# Patient Record
Sex: MALE | Race: WHITE | NOT HISPANIC OR LATINO | URBAN - METROPOLITAN AREA
[De-identification: names, ages, dates, MRNs, and addresses within clinical notes are randomized per-mention and may not be internally consistent; named-entity substitution may affect disease eponyms.]

---

## 2018-02-15 ENCOUNTER — OUTPATIENT (OUTPATIENT)
Dept: OUTPATIENT SERVICES | Facility: HOSPITAL | Age: 73
LOS: 1 days | End: 2018-02-15
Payer: MEDICARE

## 2018-02-15 VITALS
RESPIRATION RATE: 16 BRPM | TEMPERATURE: 98 F | DIASTOLIC BLOOD PRESSURE: 87 MMHG | OXYGEN SATURATION: 96 % | HEART RATE: 70 BPM | HEIGHT: 66 IN | WEIGHT: 158.95 LBS | SYSTOLIC BLOOD PRESSURE: 135 MMHG

## 2018-02-15 DIAGNOSIS — Z98.1 ARTHRODESIS STATUS: Chronic | ICD-10-CM

## 2018-02-15 DIAGNOSIS — Z98.49 CATARACT EXTRACTION STATUS, UNSPECIFIED EYE: Chronic | ICD-10-CM

## 2018-02-15 DIAGNOSIS — Z98.890 OTHER SPECIFIED POSTPROCEDURAL STATES: Chronic | ICD-10-CM

## 2018-02-15 DIAGNOSIS — Z01.818 ENCOUNTER FOR OTHER PREPROCEDURAL EXAMINATION: ICD-10-CM

## 2018-02-15 DIAGNOSIS — I65.21 OCCLUSION AND STENOSIS OF RIGHT CAROTID ARTERY: ICD-10-CM

## 2018-02-15 LAB
ANION GAP SERPL CALC-SCNC: 21 MMOL/L — HIGH (ref 5–17)
BLD GP AB SCN SERPL QL: NEGATIVE — SIGNIFICANT CHANGE UP
BUN SERPL-MCNC: 26 MG/DL — HIGH (ref 7–23)
CALCIUM SERPL-MCNC: 9.8 MG/DL — SIGNIFICANT CHANGE UP (ref 8.4–10.5)
CHLORIDE SERPL-SCNC: 103 MMOL/L — SIGNIFICANT CHANGE UP (ref 96–108)
CO2 SERPL-SCNC: 19 MMOL/L — LOW (ref 22–31)
CREAT SERPL-MCNC: 1.55 MG/DL — HIGH (ref 0.5–1.3)
GLUCOSE SERPL-MCNC: 116 MG/DL — HIGH (ref 70–99)
HCT VFR BLD CALC: 51.2 % — HIGH (ref 39–50)
HGB BLD-MCNC: 16.9 G/DL — SIGNIFICANT CHANGE UP (ref 13–17)
MCHC RBC-ENTMCNC: 31.2 PG — SIGNIFICANT CHANGE UP (ref 27–34)
MCHC RBC-ENTMCNC: 33 GM/DL — SIGNIFICANT CHANGE UP (ref 32–36)
MCV RBC AUTO: 94.5 FL — SIGNIFICANT CHANGE UP (ref 80–100)
PLATELET # BLD AUTO: 232 K/UL — SIGNIFICANT CHANGE UP (ref 150–400)
POTASSIUM SERPL-MCNC: 3.5 MMOL/L — SIGNIFICANT CHANGE UP (ref 3.5–5.3)
POTASSIUM SERPL-SCNC: 3.5 MMOL/L — SIGNIFICANT CHANGE UP (ref 3.5–5.3)
RBC # BLD: 5.42 M/UL — SIGNIFICANT CHANGE UP (ref 4.2–5.8)
RBC # FLD: 14.7 % — HIGH (ref 10.3–14.5)
RH IG SCN BLD-IMP: POSITIVE — SIGNIFICANT CHANGE UP
SODIUM SERPL-SCNC: 143 MMOL/L — SIGNIFICANT CHANGE UP (ref 135–145)
WBC # BLD: 10.74 K/UL — HIGH (ref 3.8–10.5)
WBC # FLD AUTO: 10.74 K/UL — HIGH (ref 3.8–10.5)

## 2018-02-15 PROCEDURE — 80048 BASIC METABOLIC PNL TOTAL CA: CPT

## 2018-02-15 PROCEDURE — G0463: CPT

## 2018-02-15 PROCEDURE — 86901 BLOOD TYPING SEROLOGIC RH(D): CPT

## 2018-02-15 PROCEDURE — 85027 COMPLETE CBC AUTOMATED: CPT

## 2018-02-15 PROCEDURE — 86900 BLOOD TYPING SEROLOGIC ABO: CPT

## 2018-02-15 PROCEDURE — 86850 RBC ANTIBODY SCREEN: CPT

## 2018-02-15 NOTE — H&P PST ADULT - NSANTHOSAYNRD_GEN_A_CORE
No. FRANCO screening performed.  STOP BANG Legend: 0-2 = LOW Risk; 3-4 = INTERMEDIATE Risk; 5-8 = HIGH Risk

## 2018-02-15 NOTE — H&P PST ADULT - PMH
COPD (chronic obstructive pulmonary disease)  Bronchiectasis, recent PFT test was normal ( as per patient )  GERD (gastroesophageal reflux disease)    Hypercholesteremia    Hypertension    Stenosis of right carotid artery  98% COPD (chronic obstructive pulmonary disease)  Bronchiectasis, recent PFT test was normal ( as per patient )  Deviated septum  septum perforation , spoke with ENT today , inconsequential , no issues  GERD (gastroesophageal reflux disease)    Hypercholesteremia    Hypertension    Stenosis of right carotid artery  severe COPD (chronic obstructive pulmonary disease)  Bronchiectasis, recent PFT test was normal ( as per patient ), followed by Dr. Cheng  Deviated septum  septum perforation , spoke with ENT today , inconsequential , no issues  GERD (gastroesophageal reflux disease)  Moreno's esophagus  Hypercholesteremia    Hypertension    Stenosis of right carotid artery  severe

## 2018-02-15 NOTE — H&P PST ADULT - OTHER CARE PROVIDERS
[facility] [phone] [facility] [phone], Cardiologist Dr. Ralph Guzmán  [phone] last seen 2/2018, ENT Dr. Amanda Johnson [phone], pulm Dr. Jordin Fisher [phone] ( last seen 6 month ago )

## 2018-02-15 NOTE — H&P PST ADULT - PROBLEM SELECTOR PLAN 1
Right carotid endarterectomy   PST instructions provided, surgical scrub given, patient verbalized understanding.   CBC, BMP, T&S collected and send.  scheduled to see PCP tomorrow  cardiac eval in the chart , reviewed

## 2018-02-15 NOTE — H&P PST ADULT - ATTENDING COMMENTS
We plan right carotid endarterectomy for high grade stenosis.  All risks and alternatives were reiterated to the Avanzatos who agree with this plan.

## 2018-02-15 NOTE — H&P PST ADULT - PSH
H/O colonoscopy  2015  H/O spinal fusion  2000  rods cervical and lumbar spine  History of cataract surgery  b/l  History of tonsillectomy

## 2018-02-15 NOTE — H&P PST ADULT - HISTORY OF PRESENT ILLNESS
Pt of 67y Male with PMH of HTN and High Chol and PSH of Cervical and Lumbar Spine Surgery (Retroperitoneal Approach) that come to the ER today c/o Nausea, vomiting and abdominal pain for 24 hours, He denies Fever, CP or SOB, Also He c/o Costipation for 24 hours, But is passing flatus. Last colonoscopy 2 years ago (? Normal), In the ER He got a CT Scan that show Partial SBO vs Ileus for that reason He is Admited to Surgery. Pt of 67y Male with PMH of HTN and High Chol and PSH of Cervical and Lumbar Spine Surgery (Retroperitoneal Approach) 73 yr old male with HTN, HLD, COPD, GERD,  spinal stenosis, s/p cervical and lumbar fusion 2000, severe, right carotid artery stenosis, presents to [facility] for scheduled right carotid endarterectomy on 2/23/18. Reports dizziness, slight vision changes, denies fever, chills, no acute complaints. Ambulating without assistance. Patient is heard of hearing.     Patient used to take Aspirin, but stopped due to easy bruising. Reports heavy nose bleeds end of December.  Patient reports chronic, septum perforation, I spoke with ENT, Dr. Johnson today, septum perforation, is very small and inconsequential.     Patient saw cardiologist Dr. Guzmán 2/6/18, evaluation note in the chart with Echo and EKG. 73 yr old male with HTN, HLD, COPD, GERD,  spinal stenosis, s/p cervical and lumbar fusion 2000, severe, right carotid artery stenosis, presents to [facility] for scheduled right carotid endarterectomy on 2/23/18. Reports dizziness, slight vision changes, denies fever, chills, no acute complaints. Ambulating without assistance. Patient is heard of hearing.     Patient used to take Aspirin, but stopped due to easy bruising. Reports heavy nose bleeds end of December.  Patient reports chronic, septum perforation, I spoke with ENT, Dr. Johnson today, septum perforation, is very small and inconsequential.     Patient saw cardiologist Dr. Guzmán 2/6/18, evaluation note in the chart with Echo and EKG.     PCP note has documented Allergies to Novocain, Doxycycline , Methylprednisolone , Diphtheria -Tetanus toxoids ( patient denies these allergies ) . 73 yr old male with HTN, HLD, COPD, GERD,  spinal stenosis, s/p cervical and lumbar fusion 2000, severe, right carotid artery stenosis, presents to [facility] for scheduled right carotid endarterectomy on 2/23/18. Reports dizziness, slight vision changes, denies fever, chills, no acute complaints. Ambulating without assistance. Patient is heard of hearing.     Patient used to take Aspirin, but stopped due to easy bruising. Reports heavy nose bleeds end of December.  Patient reports chronic, septum perforation, I spoke with ENT, Dr. Johnson today, septum perforation, is very small and inconsequential.     Patient saw cardiologist Dr. Guzmán 2/6/18, evaluation note in the chart with Echo and EKG.     PCP/cardiology notes documented Allergies to Novocain, Doxycycline , Methylprednisolone , Diphtheria -Tetanus toxoids ( patient denies these allergies ) .

## 2018-02-23 ENCOUNTER — TRANSCRIPTION ENCOUNTER (OUTPATIENT)
Age: 73
End: 2018-02-23

## 2018-02-23 ENCOUNTER — INPATIENT (INPATIENT)
Facility: HOSPITAL | Age: 73
LOS: 1 days | Discharge: HOME | DRG: 39 | End: 2018-02-25
Attending: SURGERY | Admitting: SURGERY
Payer: MEDICARE

## 2018-02-23 ENCOUNTER — RESULT REVIEW (OUTPATIENT)
Age: 73
End: 2018-02-23

## 2018-02-23 VITALS
HEART RATE: 69 BPM | SYSTOLIC BLOOD PRESSURE: 142 MMHG | TEMPERATURE: 98 F | WEIGHT: 158.95 LBS | HEIGHT: 66 IN | DIASTOLIC BLOOD PRESSURE: 82 MMHG | OXYGEN SATURATION: 97 % | RESPIRATION RATE: 18 BRPM

## 2018-02-23 DIAGNOSIS — Z98.890 OTHER SPECIFIED POSTPROCEDURAL STATES: Chronic | ICD-10-CM

## 2018-02-23 DIAGNOSIS — Z98.49 CATARACT EXTRACTION STATUS, UNSPECIFIED EYE: Chronic | ICD-10-CM

## 2018-02-23 DIAGNOSIS — I65.21 OCCLUSION AND STENOSIS OF RIGHT CAROTID ARTERY: ICD-10-CM

## 2018-02-23 DIAGNOSIS — Z98.1 ARTHRODESIS STATUS: Chronic | ICD-10-CM

## 2018-02-23 PROCEDURE — 88304 TISSUE EXAM BY PATHOLOGIST: CPT | Mod: 26

## 2018-02-23 PROCEDURE — 88311 DECALCIFY TISSUE: CPT | Mod: 26

## 2018-02-23 RX ORDER — OXYCODONE HYDROCHLORIDE 5 MG/1
5 TABLET ORAL EVERY 4 HOURS
Qty: 0 | Refills: 0 | Status: DISCONTINUED | OUTPATIENT
Start: 2018-02-23 | End: 2018-02-25

## 2018-02-23 RX ORDER — ONDANSETRON 8 MG/1
4 TABLET, FILM COATED ORAL ONCE
Qty: 0 | Refills: 0 | Status: DISCONTINUED | OUTPATIENT
Start: 2018-02-23 | End: 2018-02-23

## 2018-02-23 RX ORDER — HEPARIN SODIUM 5000 [USP'U]/ML
5000 INJECTION INTRAVENOUS; SUBCUTANEOUS EVERY 8 HOURS
Qty: 0 | Refills: 0 | Status: DISCONTINUED | OUTPATIENT
Start: 2018-02-23 | End: 2018-02-25

## 2018-02-23 RX ORDER — ACETAMINOPHEN 500 MG
1000 TABLET ORAL ONCE
Qty: 0 | Refills: 0 | Status: COMPLETED | OUTPATIENT
Start: 2018-02-23 | End: 2018-02-23

## 2018-02-23 RX ORDER — AMLODIPINE BESYLATE 2.5 MG/1
5 TABLET ORAL DAILY
Qty: 0 | Refills: 0 | Status: DISCONTINUED | OUTPATIENT
Start: 2018-02-23 | End: 2018-02-25

## 2018-02-23 RX ORDER — HYDROMORPHONE HYDROCHLORIDE 2 MG/ML
0.25 INJECTION INTRAMUSCULAR; INTRAVENOUS; SUBCUTANEOUS
Qty: 0 | Refills: 0 | Status: DISCONTINUED | OUTPATIENT
Start: 2018-02-23 | End: 2018-02-23

## 2018-02-23 RX ORDER — DEXTROSE MONOHYDRATE, SODIUM CHLORIDE, AND POTASSIUM CHLORIDE 50; .745; 4.5 G/1000ML; G/1000ML; G/1000ML
1000 INJECTION, SOLUTION INTRAVENOUS
Qty: 0 | Refills: 0 | Status: DISCONTINUED | OUTPATIENT
Start: 2018-02-23 | End: 2018-02-24

## 2018-02-23 RX ORDER — PANTOPRAZOLE SODIUM 20 MG/1
40 TABLET, DELAYED RELEASE ORAL
Qty: 0 | Refills: 0 | Status: DISCONTINUED | OUTPATIENT
Start: 2018-02-23 | End: 2018-02-25

## 2018-02-23 RX ORDER — ALBUTEROL 90 UG/1
2 AEROSOL, METERED ORAL EVERY 6 HOURS
Qty: 0 | Refills: 0 | Status: DISCONTINUED | OUTPATIENT
Start: 2018-02-23 | End: 2018-02-25

## 2018-02-23 RX ORDER — VANCOMYCIN HCL 1 G
1000 VIAL (EA) INTRAVENOUS ONCE
Qty: 0 | Refills: 0 | Status: COMPLETED | OUTPATIENT
Start: 2018-02-23 | End: 2018-02-23

## 2018-02-23 RX ORDER — LIDOCAINE HCL 20 MG/ML
0.2 VIAL (ML) INJECTION ONCE
Qty: 0 | Refills: 0 | Status: COMPLETED | OUTPATIENT
Start: 2018-02-23 | End: 2018-02-23

## 2018-02-23 RX ORDER — ACETAMINOPHEN 500 MG
325 TABLET ORAL EVERY 4 HOURS
Qty: 0 | Refills: 0 | Status: DISCONTINUED | OUTPATIENT
Start: 2018-02-23 | End: 2018-02-25

## 2018-02-23 RX ORDER — ASPIRIN/CALCIUM CARB/MAGNESIUM 324 MG
81 TABLET ORAL DAILY
Qty: 0 | Refills: 0 | Status: DISCONTINUED | OUTPATIENT
Start: 2018-02-23 | End: 2018-02-25

## 2018-02-23 RX ORDER — SODIUM CHLORIDE 9 MG/ML
3 INJECTION INTRAMUSCULAR; INTRAVENOUS; SUBCUTANEOUS EVERY 8 HOURS
Qty: 0 | Refills: 0 | Status: DISCONTINUED | OUTPATIENT
Start: 2018-02-23 | End: 2018-02-23

## 2018-02-23 RX ADMIN — ALBUTEROL 2 PUFF(S): 90 AEROSOL, METERED ORAL at 16:42

## 2018-02-23 RX ADMIN — DEXTROSE MONOHYDRATE, SODIUM CHLORIDE, AND POTASSIUM CHLORIDE 75 MILLILITER(S): 50; .745; 4.5 INJECTION, SOLUTION INTRAVENOUS at 16:47

## 2018-02-23 RX ADMIN — HEPARIN SODIUM 5000 UNIT(S): 5000 INJECTION INTRAVENOUS; SUBCUTANEOUS at 22:15

## 2018-02-23 RX ADMIN — Medication 400 MILLIGRAM(S): at 21:00

## 2018-02-23 RX ADMIN — HYDROMORPHONE HYDROCHLORIDE 0.25 MILLIGRAM(S): 2 INJECTION INTRAMUSCULAR; INTRAVENOUS; SUBCUTANEOUS at 16:45

## 2018-02-23 RX ADMIN — Medication 1000 MILLIGRAM(S): at 21:15

## 2018-02-23 RX ADMIN — HYDROMORPHONE HYDROCHLORIDE 0.25 MILLIGRAM(S): 2 INJECTION INTRAMUSCULAR; INTRAVENOUS; SUBCUTANEOUS at 17:00

## 2018-02-23 NOTE — CHART NOTE - NSCHARTNOTEFT_GEN_A_CORE
Patient is a 73y old  Male who presents with a chief complaint of right carotid stenosis , 98% (24 Feb 2018 03:51)    SUBJECTIVE: Patient was examined at bedside in PACU s/p Right sided carotid endarterectomy.  Denies n/v, headache, dizziness, chest pain, or shortness of breath.  Patient states he has not been able to urinate very much (<100cc post-operatively). ~700cc seen on bladder scan. Discussed option of placing a menon catheter and the patient agrees to it.  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  He describes tenderness over the incision site and requests IV Tylenol (does not want narcotics); pain relieved w/ Ofirmev (IV Tylenol).    OBJECTIVE:  FOCUSED PHYSICAL EXAM:  GA: NAD  NECK: R side has guaze-dressing w tegaderm (light strikethrough) over surgical incision site; not removed on assessment as it is POD 0 and will be removed in the AM  NEURO: CNII-XI assessed and are grossly intact.  : Menon catheter placed in PACU; drained 400cc immediately after placement.    Vitals/Labs:  Vital Signs Last 24 Hrs  T(C): 36.9 (24 Feb 2018 14:05), Max: 36.9 (23 Feb 2018 23:14)  T(F): 98.4 (24 Feb 2018 14:05), Max: 98.4 (23 Feb 2018 23:14)  HR: 76 (24 Feb 2018 14:05) (64 - 86)  BP: 131/73 (24 Feb 2018 14:05) (115/61 - 153/90)  BP(mean): 101 (23 Feb 2018 21:00) (88 - 101)  RR: 18 (24 Feb 2018 14:05) (14 - 20)  SpO2: 95% (24 Feb 2018 14:05) (93% - 100%)    I&O's Detail    23 Feb 2018 07:01  -  24 Feb 2018 07:00  --------------------------------------------------------  IN:    dextrose 5% + sodium chloride 0.45% with potassium chloride 20 mEq/L: 975 mL    IV PiggyBack: 200 mL    Oral Fluid: 575 mL  Total IN: 1750 mL    OUT:    Indwelling Catheter - Urethral: 1850 mL    Voided: 100 mL  Total OUT: 1950 mL    Total NET: -200 mL      24 Feb 2018 07:01  -  24 Feb 2018 14:31  --------------------------------------------------------  IN:    Oral Fluid: 600 mL  Total IN: 600 mL    OUT:    Indwelling Catheter - Urethral: 375 mL    Voided: 275 mL  Total OUT: 650 mL    Total NET: -50 mL                            15.6   14.5  )-----------( 186      ( 24 Feb 2018 06:55 )             47.8       02-24    139  |  100  |  18  ----------------------------<  134<H>  3.7   |  23  |  1.15    Ca    9.2      24 Feb 2018 06:55        ASSESSMENT/PLAN: DHRUV DONOHUE 73y Male s/p  R CEA POD 0  - Maintain SBP 120s-160s  - Diet: CLD; advance to Reg in AM  - Pain control IV Tylenol>Oxy/narcotics  - Input and outputs (Billie in place)  - DVT ppx SQH; refuses 81mg ASA as he reports an episode of "hemorrhaging through nose years ago"  - OOB/incentive spirometry

## 2018-02-24 LAB
ANION GAP SERPL CALC-SCNC: 16 MMOL/L — SIGNIFICANT CHANGE UP (ref 5–17)
BUN SERPL-MCNC: 18 MG/DL — SIGNIFICANT CHANGE UP (ref 7–23)
CALCIUM SERPL-MCNC: 9.2 MG/DL — SIGNIFICANT CHANGE UP (ref 8.4–10.5)
CHLORIDE SERPL-SCNC: 100 MMOL/L — SIGNIFICANT CHANGE UP (ref 96–108)
CO2 SERPL-SCNC: 23 MMOL/L — SIGNIFICANT CHANGE UP (ref 22–31)
CREAT SERPL-MCNC: 1.15 MG/DL — SIGNIFICANT CHANGE UP (ref 0.5–1.3)
GLUCOSE SERPL-MCNC: 134 MG/DL — HIGH (ref 70–99)
HCT VFR BLD CALC: 47.8 % — SIGNIFICANT CHANGE UP (ref 39–50)
HGB BLD-MCNC: 15.6 G/DL — SIGNIFICANT CHANGE UP (ref 13–17)
MCHC RBC-ENTMCNC: 30.9 PG — SIGNIFICANT CHANGE UP (ref 27–34)
MCHC RBC-ENTMCNC: 32.7 GM/DL — SIGNIFICANT CHANGE UP (ref 32–36)
MCV RBC AUTO: 94.7 FL — SIGNIFICANT CHANGE UP (ref 80–100)
PLATELET # BLD AUTO: 186 K/UL — SIGNIFICANT CHANGE UP (ref 150–400)
POTASSIUM SERPL-MCNC: 3.7 MMOL/L — SIGNIFICANT CHANGE UP (ref 3.5–5.3)
POTASSIUM SERPL-SCNC: 3.7 MMOL/L — SIGNIFICANT CHANGE UP (ref 3.5–5.3)
RBC # BLD: 5.05 M/UL — SIGNIFICANT CHANGE UP (ref 4.2–5.8)
RBC # FLD: 13 % — SIGNIFICANT CHANGE UP (ref 10.3–14.5)
SODIUM SERPL-SCNC: 139 MMOL/L — SIGNIFICANT CHANGE UP (ref 135–145)
WBC # BLD: 14.5 K/UL — HIGH (ref 3.8–10.5)
WBC # FLD AUTO: 14.5 K/UL — HIGH (ref 3.8–10.5)

## 2018-02-24 RX ORDER — LANOLIN ALCOHOL/MO/W.PET/CERES
3 CREAM (GRAM) TOPICAL AT BEDTIME
Qty: 0 | Refills: 0 | Status: DISCONTINUED | OUTPATIENT
Start: 2018-02-24 | End: 2018-02-25

## 2018-02-24 RX ORDER — ACETAMINOPHEN 500 MG
1000 TABLET ORAL ONCE
Qty: 0 | Refills: 0 | Status: COMPLETED | OUTPATIENT
Start: 2018-02-24 | End: 2018-02-24

## 2018-02-24 RX ADMIN — AMLODIPINE BESYLATE 5 MILLIGRAM(S): 2.5 TABLET ORAL at 06:22

## 2018-02-24 RX ADMIN — Medication 325 MILLIGRAM(S): at 06:22

## 2018-02-24 RX ADMIN — Medication 1000 MILLIGRAM(S): at 01:09

## 2018-02-24 RX ADMIN — Medication 81 MILLIGRAM(S): at 12:13

## 2018-02-24 RX ADMIN — ALBUTEROL 2 PUFF(S): 90 AEROSOL, METERED ORAL at 12:13

## 2018-02-24 RX ADMIN — Medication 400 MILLIGRAM(S): at 00:39

## 2018-02-24 RX ADMIN — PANTOPRAZOLE SODIUM 40 MILLIGRAM(S): 20 TABLET, DELAYED RELEASE ORAL at 06:22

## 2018-02-24 RX ADMIN — HEPARIN SODIUM 5000 UNIT(S): 5000 INJECTION INTRAVENOUS; SUBCUTANEOUS at 18:33

## 2018-02-24 RX ADMIN — Medication 325 MILLIGRAM(S): at 12:13

## 2018-02-24 RX ADMIN — HEPARIN SODIUM 5000 UNIT(S): 5000 INJECTION INTRAVENOUS; SUBCUTANEOUS at 06:22

## 2018-02-24 NOTE — DISCHARGE NOTE ADULT - NS AS DC STROKE ED MATERIALS
Stroke Education Booklet/Stroke Warning Signs and Symptoms/Risk Factors for Stroke/Prescribed Medications/Need for Followup After Discharge/Call 911 for Stroke Call 911 for Stroke/Stroke Education Booklet/Stroke Warning Signs and Symptoms/Risk Factors for Stroke/Prescribed Medications/Need for Followup After Discharge

## 2018-02-24 NOTE — PROGRESS NOTE ADULT - SUBJECTIVE AND OBJECTIVE BOX
VASCULAR SURGERY PROGRESS NOTE    SUBJECTIVE: Pt seen at bedside. Denies pain, nausea, vomiting. No acute events o/n. Resting comfortably on exam, tolerated procedure well.     Vital Signs Last 24 Hrs  T(C): 36.7 (24 Feb 2018 10:30), Max: 36.9 (23 Feb 2018 23:14)  T(F): 98 (24 Feb 2018 10:30), Max: 98.4 (23 Feb 2018 23:14)  HR: 86 (24 Feb 2018 10:30) (64 - 86)  BP: 146/76 (24 Feb 2018 10:30) (115/61 - 153/90)  BP(mean): 101 (23 Feb 2018 21:00) (88 - 101)  RR: 18 (24 Feb 2018 10:30) (14 - 20)  SpO2: 95% (24 Feb 2018 10:30) (93% - 100%)      Physical Exam:   General Appearance: Lying in bed, NAD  Neuro: CNII-XII intact, no focal deficits   Abdomen: soft, ND, NT  Extremities: WWP, MAEx4  Incisions: R neck incision c/d/i        I&O's Summary    23 Feb 2018 07:01  -  24 Feb 2018 07:00  --------------------------------------------------------  IN: 1750 mL / OUT: 1950 mL / NET: -200 mL    24 Feb 2018 07:01  -  24 Feb 2018 11:59  --------------------------------------------------------  IN: 260 mL / OUT: 450 mL / NET: -190 mL      I&O's Detail    23 Feb 2018 07:01  -  24 Feb 2018 07:00  --------------------------------------------------------  IN:    dextrose 5% + sodium chloride 0.45% with potassium chloride 20 mEq/L: 975 mL    IV PiggyBack: 200 mL    Oral Fluid: 575 mL  Total IN: 1750 mL    OUT:    Indwelling Catheter - Urethral: 1850 mL    Voided: 100 mL  Total OUT: 1950 mL    Total NET: -200 mL      24 Feb 2018 07:01  -  24 Feb 2018 11:59  --------------------------------------------------------  IN:    Oral Fluid: 260 mL  Total IN: 260 mL    OUT:    Indwelling Catheter - Urethral: 375 mL    Voided: 75 mL  Total OUT: 450 mL    Total NET: -190 mL          MEDICATIONS  (STANDING):  amLODIPine   Tablet 5 milliGRAM(s) Oral daily  aspirin enteric coated 81 milliGRAM(s) Oral daily  heparin  Injectable 5000 Unit(s) SubCutaneous every 8 hours  pantoprazole    Tablet 40 milliGRAM(s) Oral before breakfast    MEDICATIONS  (PRN):  acetaminophen   Tablet. 325 milliGRAM(s) Oral every 4 hours PRN Mild Pain (1 - 3)  ALBUTerol    90 MICROgram(s) HFA Inhaler 2 Puff(s) Inhalation every 6 hours PRN Shortness of Breath and/or Wheezing  oxyCODONE    IR 5 milliGRAM(s) Oral every 4 hours PRN Moderate Pain (4 - 6)      LABS:                        15.6   14.5  )-----------( 186      ( 24 Feb 2018 06:55 )             47.8     02-24    139  |  100  |  18  ----------------------------<  134<H>  3.7   |  23  |  1.15    Ca    9.2      24 Feb 2018 06:55                RADIOLOGY & ADDITIONAL STUDIES: VASCULAR SURGERY PROGRESS NOTE    SUBJECTIVE: Pt seen at bedside. Denies pain, nausea, vomiting. No acute events o/n. Resting comfortably on exam, tolerated procedure well.     Vital Signs Last 24 Hrs  T(C): 36.7 (24 Feb 2018 10:30), Max: 36.9 (23 Feb 2018 23:14)  T(F): 98 (24 Feb 2018 10:30), Max: 98.4 (23 Feb 2018 23:14)  HR: 86 (24 Feb 2018 10:30) (64 - 86)  BP: 146/76 (24 Feb 2018 10:30) (115/61 - 153/90)  BP(mean): 101 (23 Feb 2018 21:00) (88 - 101)  RR: 18 (24 Feb 2018 10:30) (14 - 20)  SpO2: 95% (24 Feb 2018 10:30) (93% - 100%)      Physical Exam:   General Appearance: Lying in bed, NAD  Neuro: CNII-XII intact, no focal deficits   Abdomen: soft, ND, NT  Extremities: WWP, MAEx4  Incisions: R neck incision c/d/i, (-) hematoma, (-) ecchymosis, (+) hemostasis        I&O's Summary    23 Feb 2018 07:01  -  24 Feb 2018 07:00  --------------------------------------------------------  IN: 1750 mL / OUT: 1950 mL / NET: -200 mL    24 Feb 2018 07:01  -  24 Feb 2018 11:59  --------------------------------------------------------  IN: 260 mL / OUT: 450 mL / NET: -190 mL      I&O's Detail    23 Feb 2018 07:01  -  24 Feb 2018 07:00  --------------------------------------------------------  IN:    dextrose 5% + sodium chloride 0.45% with potassium chloride 20 mEq/L: 975 mL    IV PiggyBack: 200 mL    Oral Fluid: 575 mL  Total IN: 1750 mL    OUT:    Indwelling Catheter - Urethral: 1850 mL    Voided: 100 mL  Total OUT: 1950 mL    Total NET: -200 mL      24 Feb 2018 07:01  -  24 Feb 2018 11:59  --------------------------------------------------------  IN:    Oral Fluid: 260 mL  Total IN: 260 mL    OUT:    Indwelling Catheter - Urethral: 375 mL    Voided: 75 mL  Total OUT: 450 mL    Total NET: -190 mL          MEDICATIONS  (STANDING):  amLODIPine   Tablet 5 milliGRAM(s) Oral daily  aspirin enteric coated 81 milliGRAM(s) Oral daily  heparin  Injectable 5000 Unit(s) SubCutaneous every 8 hours  pantoprazole    Tablet 40 milliGRAM(s) Oral before breakfast    MEDICATIONS  (PRN):  acetaminophen   Tablet. 325 milliGRAM(s) Oral every 4 hours PRN Mild Pain (1 - 3)  ALBUTerol    90 MICROgram(s) HFA Inhaler 2 Puff(s) Inhalation every 6 hours PRN Shortness of Breath and/or Wheezing  oxyCODONE    IR 5 milliGRAM(s) Oral every 4 hours PRN Moderate Pain (4 - 6)      LABS:                        15.6   14.5  )-----------( 186      ( 24 Feb 2018 06:55 )             47.8     02-24    139  |  100  |  18  ----------------------------<  134<H>  3.7   |  23  |  1.15    Ca    9.2      24 Feb 2018 06:55                RADIOLOGY & ADDITIONAL STUDIES:

## 2018-02-24 NOTE — DISCHARGE NOTE ADULT - ADDITIONAL INSTRUCTIONS
Please follow up with Dr. Jorgito Fraser within 1-2 weeks after discharge from the hospital. You may call [phone]  to schedule an appointment.

## 2018-02-24 NOTE — DISCHARGE NOTE ADULT - PATIENT PORTAL LINK FT
You can access the [facility] Patient Portal, offered by [facility], by registering with the following website: [email or web address]

## 2018-02-24 NOTE — DISCHARGE NOTE ADULT - HOSPITAL COURSE
74 yo male underwent carotid endarterectomy on 2/23/2018. Patient was stable post-operatively and was was stable for discharge 2/25/2018. 74 yo male underwent right sided carotid endarterectomy on 2/23/2018 for severe right carotid artery stenosis (98%). Patient was stable post-operatively with no acute deficits and was stable for discharge on 2/25/2018.

## 2018-02-24 NOTE — PROVIDER CONTACT NOTE (MEDICATION) - SITUATION
Pt a/ox4, at rest in bed, vss, s/p CEA with neuro check intact, no s/s of deficits. PT c/o pain on R neck/head around incision area.

## 2018-02-24 NOTE — DISCHARGE NOTE ADULT - CARE PLAN
Principal Discharge DX:	Stenosis of right carotid artery  Goal:	carotid endarterectomy  Assessment and plan of treatment:	May resume regular diet. Follow up with Dr. Fraser in 2 weeks. Principal Discharge DX:	Stenosis of right carotid artery  Goal:	carotid endarterectomy  Assessment and plan of treatment:	May resume regular diet. Follow up with Dr. Jorgito Fraser in 2 weeks.

## 2018-02-24 NOTE — DISCHARGE NOTE ADULT - CARE PROVIDER_API CALL
Ximena Fraser), Female Pelvic MedReconst Surg; Obstetrics and Gynecology  [address] [place] [address]  Phone: [phone]  Fax: [phone] Jorgito Fraser), Vascular Surgery  [address] [facility] [address] [place] [address]  Phone: [phone]  Fax: [phone]

## 2018-02-24 NOTE — DISCHARGE NOTE ADULT - MEDICATION SUMMARY - MEDICATIONS TO TAKE
I will START or STAY ON the medications listed below when I get home from the hospital:    aspirin 81 mg oral delayed release tablet  -- 1 tab(s) by mouth once a day  -- Indication: For Blood thinner for post-op carotid surgery    acetaminophen 325 mg oral tablet  -- 1 tab(s) by mouth every 4 hours, As needed, Mild Pain (1 - 3)  -- Indication: For PRN medication (as needed) for post-surgical pain    ProAir HFA 90 mcg/inh inhalation aerosol  -- 2 puff(s) inhaled 4 times a day, As Needed  -- Indication: For Home medication    Breo Ellipta 200 mcg-25 mcg/inh inhalation powder  -- 1 puff(s) inhaled once a day, As Needed  -- Indication: For Home medication    amLODIPine 5 mg oral tablet  -- 1 tab(s) by mouth once a day  -- Indication: For Home medication    chlorthalidone 25 mg oral tablet  -- 1 tab(s) by mouth once a day  -- Indication: For Home medication    omeprazole 40 mg oral delayed release capsule  -- 1 cap(s) by mouth 2 times a day  -- Indication: For Home medication

## 2018-02-25 VITALS
HEART RATE: 71 BPM | RESPIRATION RATE: 18 BRPM | DIASTOLIC BLOOD PRESSURE: 76 MMHG | OXYGEN SATURATION: 96 % | TEMPERATURE: 98 F | SYSTOLIC BLOOD PRESSURE: 138 MMHG

## 2018-02-25 PROCEDURE — 88311 DECALCIFY TISSUE: CPT

## 2018-02-25 PROCEDURE — 94640 AIRWAY INHALATION TREATMENT: CPT

## 2018-02-25 PROCEDURE — 88304 TISSUE EXAM BY PATHOLOGIST: CPT

## 2018-02-25 PROCEDURE — C1889: CPT

## 2018-02-25 PROCEDURE — 85027 COMPLETE CBC AUTOMATED: CPT

## 2018-02-25 PROCEDURE — C1769: CPT

## 2018-02-25 PROCEDURE — 80048 BASIC METABOLIC PNL TOTAL CA: CPT

## 2018-02-25 RX ORDER — ACETAMINOPHEN 500 MG
1 TABLET ORAL
Qty: 0 | Refills: 0 | DISCHARGE
Start: 2018-02-25

## 2018-02-25 RX ORDER — ASPIRIN/CALCIUM CARB/MAGNESIUM 324 MG
1 TABLET ORAL
Qty: 0 | Refills: 0 | DISCHARGE
Start: 2018-02-25

## 2018-02-25 RX ADMIN — Medication 81 MILLIGRAM(S): at 11:09

## 2018-02-25 RX ADMIN — PANTOPRAZOLE SODIUM 40 MILLIGRAM(S): 20 TABLET, DELAYED RELEASE ORAL at 05:38

## 2018-02-25 RX ADMIN — Medication 30 MILLILITER(S): at 01:25

## 2018-02-25 RX ADMIN — AMLODIPINE BESYLATE 5 MILLIGRAM(S): 2.5 TABLET ORAL at 05:37

## 2018-02-25 RX ADMIN — HEPARIN SODIUM 5000 UNIT(S): 5000 INJECTION INTRAVENOUS; SUBCUTANEOUS at 01:04

## 2018-02-25 RX ADMIN — HEPARIN SODIUM 5000 UNIT(S): 5000 INJECTION INTRAVENOUS; SUBCUTANEOUS at 11:09

## 2018-02-25 NOTE — PROGRESS NOTE ADULT - ASSESSMENT
73y Male s/p  R CEA POD 1, doing well; avss, pain controlled, tolerating diet.    - Continue Reg diet  - Pain control Tylenol  - DVT ppx SQH; 81mg ASA daily  - OOB/incentive spirometry  - D/C home today
Assessment: 72 y/o M HD 2 POD 1 s/p R CEA, recovering well on the floor, AVSS post-operatively     Plan:   Diet: Advance diet   Monitor vitals, I & O, GI fx   Pain management prn   Encourage ambulation/OOB   Dressing change/wound care prn  Dispo: d/c home tomorrow     Vascular Surgery

## 2018-02-25 NOTE — PROGRESS NOTE ADULT - SUBJECTIVE AND OBJECTIVE BOX
Vascular Surgery Progress Note:    Hospital Day: 3  Post-Operative Day: 2 s/p Right sided carotid endarterectomy    Subjective:  No acute overnight events. Patient examined at bedside this AM, resting.  States that he wants to go home. No complaints at this time.    Objective:  T(C): 36.7 (02-25-18 @ 09:05), Max: 37.1 (02-24-18 @ 17:19)  HR: 71 (02-25-18 @ 09:05) (63 - 76)  BP: 138/76 (02-25-18 @ 09:05) (117/64 - 150/79)  RR: 18 (02-25-18 @ 09:05) (18 - 19)  SpO2: 96% (02-25-18 @ 09:05) (94% - 96%)    Labs:  02-24-18 @ 07:01  -  02-25-18 @ 07:00  --------------------------------------------------------  IN: 600 mL / OUT: 1250 mL / NET: -650 mL      Focused Physical Exam:  General: NAD  Respiratory: Nonlabored breathing  Neck:  R side steristrips intact, no strikethrough. Mild tenderness (appropriate).  NEURO: CNII-XI grossly intact.    Medications:  acetaminophen   Tablet. 325 milliGRAM(s) Oral every 4 hours PRN  ALBUTerol    90 MICROgram(s) HFA Inhaler 2 Puff(s) Inhalation every 6 hours PRN  amLODIPine   Tablet 5 milliGRAM(s) Oral daily  aspirin enteric coated 81 milliGRAM(s) Oral daily  heparin  Injectable 5000 Unit(s) SubCutaneous every 8 hours  melatonin 3 milliGRAM(s) Oral at bedtime  oxyCODONE    IR 5 milliGRAM(s) Oral every 4 hours PRN  pantoprazole    Tablet 40 milliGRAM(s) Oral before breakfast

## 2018-12-12 PROBLEM — K21.9 GASTRO-ESOPHAGEAL REFLUX DISEASE WITHOUT ESOPHAGITIS: Chronic | Status: ACTIVE | Noted: 2018-02-15

## 2018-12-17 ENCOUNTER — OUTPATIENT (OUTPATIENT)
Dept: OUTPATIENT SERVICES | Facility: HOSPITAL | Age: 73
LOS: 1 days | End: 2018-12-17
Payer: MEDICARE

## 2018-12-17 VITALS
TEMPERATURE: 98 F | WEIGHT: 166.89 LBS | HEART RATE: 70 BPM | DIASTOLIC BLOOD PRESSURE: 77 MMHG | OXYGEN SATURATION: 95 % | SYSTOLIC BLOOD PRESSURE: 146 MMHG | RESPIRATION RATE: 16 BRPM

## 2018-12-17 DIAGNOSIS — Z98.890 OTHER SPECIFIED POSTPROCEDURAL STATES: Chronic | ICD-10-CM

## 2018-12-17 DIAGNOSIS — Z98.1 ARTHRODESIS STATUS: Chronic | ICD-10-CM

## 2018-12-17 DIAGNOSIS — Z01.818 ENCOUNTER FOR OTHER PREPROCEDURAL EXAMINATION: ICD-10-CM

## 2018-12-17 DIAGNOSIS — K43.6 OTHER AND UNSPECIFIED VENTRAL HERNIA WITH OBSTRUCTION, WITHOUT GANGRENE: ICD-10-CM

## 2018-12-17 DIAGNOSIS — Z98.49 CATARACT EXTRACTION STATUS, UNSPECIFIED EYE: Chronic | ICD-10-CM

## 2018-12-17 LAB
ALBUMIN SERPL ELPH-MCNC: 3.9 G/DL — SIGNIFICANT CHANGE UP (ref 3.3–5)
ALP SERPL-CCNC: 124 U/L — HIGH (ref 40–120)
ALT FLD-CCNC: 8 U/L — LOW (ref 12–78)
ANION GAP SERPL CALC-SCNC: 6 MMOL/L — SIGNIFICANT CHANGE UP (ref 5–17)
AST SERPL-CCNC: 26 U/L — SIGNIFICANT CHANGE UP (ref 15–37)
BILIRUB SERPL-MCNC: 0.5 MG/DL — SIGNIFICANT CHANGE UP (ref 0.2–1.2)
BUN SERPL-MCNC: 25 MG/DL — HIGH (ref 7–23)
CALCIUM SERPL-MCNC: 9.2 MG/DL — SIGNIFICANT CHANGE UP (ref 8.5–10.1)
CHLORIDE SERPL-SCNC: 105 MMOL/L — SIGNIFICANT CHANGE UP (ref 96–108)
CO2 SERPL-SCNC: 32 MMOL/L — HIGH (ref 22–31)
CREAT SERPL-MCNC: 1.5 MG/DL — HIGH (ref 0.5–1.3)
GLUCOSE SERPL-MCNC: 102 MG/DL — HIGH (ref 70–99)
HCT VFR BLD CALC: 49.6 % — SIGNIFICANT CHANGE UP (ref 39–50)
HGB BLD-MCNC: 16.8 G/DL — SIGNIFICANT CHANGE UP (ref 13–17)
MCHC RBC-ENTMCNC: 30.9 PG — SIGNIFICANT CHANGE UP (ref 27–34)
MCHC RBC-ENTMCNC: 33.9 GM/DL — SIGNIFICANT CHANGE UP (ref 32–36)
MCV RBC AUTO: 91.3 FL — SIGNIFICANT CHANGE UP (ref 80–100)
NRBC # BLD: 0 /100 WBCS — SIGNIFICANT CHANGE UP (ref 0–0)
PLATELET # BLD AUTO: 222 K/UL — SIGNIFICANT CHANGE UP (ref 150–400)
POTASSIUM SERPL-MCNC: 3.4 MMOL/L — LOW (ref 3.5–5.3)
POTASSIUM SERPL-SCNC: 3.4 MMOL/L — LOW (ref 3.5–5.3)
PROT SERPL-MCNC: 8.3 G/DL — SIGNIFICANT CHANGE UP (ref 6–8.3)
RBC # BLD: 5.43 M/UL — SIGNIFICANT CHANGE UP (ref 4.2–5.8)
RBC # FLD: 14.1 % — SIGNIFICANT CHANGE UP (ref 10.3–14.5)
SODIUM SERPL-SCNC: 143 MMOL/L — SIGNIFICANT CHANGE UP (ref 135–145)
WBC # BLD: 9.11 K/UL — SIGNIFICANT CHANGE UP (ref 3.8–10.5)
WBC # FLD AUTO: 9.11 K/UL — SIGNIFICANT CHANGE UP (ref 3.8–10.5)

## 2018-12-17 PROCEDURE — 36415 COLL VENOUS BLD VENIPUNCTURE: CPT

## 2018-12-17 PROCEDURE — 85027 COMPLETE CBC AUTOMATED: CPT

## 2018-12-17 PROCEDURE — 93005 ELECTROCARDIOGRAM TRACING: CPT

## 2018-12-17 PROCEDURE — 93010 ELECTROCARDIOGRAM REPORT: CPT | Mod: NC

## 2018-12-17 PROCEDURE — 80053 COMPREHEN METABOLIC PANEL: CPT

## 2018-12-17 PROCEDURE — G0463: CPT

## 2018-12-17 NOTE — H&P PST ADULT - RS GEN PE MLT RESP DETAILS PC
breath sounds equal/clear to auscultation bilaterally/airway patent/good air movement respirations non-labored/breath sounds equal/clear to auscultation bilaterally/good air movement/normal/airway patent

## 2018-12-17 NOTE — H&P PST ADULT - HISTORY OF PRESENT ILLNESS
73 yr old male with HTN, HLD, COPD, GERD,  spinal stenosis, s/p cervical and lumbar fusion 2000, severe, right carotid artery stenosis, presents to [facility] for scheduled right carotid endarterectomy on 2/23/18. Reports dizziness, slight vision changes, denies fever, chills, no acute complaints. Ambulating without assistance. Patient is heard of hearing.     Patient used to take Aspirin, but stopped due to easy bruising. Reports heavy nose bleeds end of December.  Patient reports chronic, septum perforation, I spoke with ENT, Dr. Johnson today, septum perforation, is very small and inconsequential.     Patient saw cardiologist Dr. Guzmán 2/6/18, evaluation note in the chart with Echo and EKG.     PCP/cardiology notes documented Allergies to Novocain, Doxycycline , Methylprednisolone , Diphtheria -Tetanus toxoids ( patient denies these allergies ) . 73 yr old male with HTN, HLD, COPD, GERD, spinal stenosis, s/p cervical and lumbar fusion 2000, severe, right carotid artery stenosis, presents to [facility] for scheduled right carotid endarterectomy on 2/23/18. Reports dizziness, slight vision changes, denies fever, chills, no acute complaints. Ambulating without assistance. Patient is heard of hearing.     Patient used to take Aspirin, but stopped due to easy bruising. Reports heavy nose bleeds end of December.  Patient reports chronic, septum perforation, I spoke with ENT, Dr. Johnson today, septum perforation, is very small and inconsequential.     Patient saw cardiologist Dr. Guzmán 2/6/18, evaluation note in the chart with Echo and EKG.     Pt first noticed bulge to umbilicus 73 yr old male with PMH of HTN, HLD, COPD, GERD here for PST. Pt first noticed bulge to umbilicus about 1 month ago. Pt states he has noticed hernia increasing in size. Pt diagnosed with ventral hernia by surgeon. Pt denies n/v/d and abdominal pain. Pt electing for repair ventral hernia with mesh on 1/2/19.

## 2018-12-17 NOTE — H&P PST ADULT - NEGATIVE CARDIOVASCULAR SYMPTOMS
no chest pain/no orthopnea/no claudication/no dyspnea on exertion/no paroxysmal nocturnal dyspnea/no peripheral edema/no palpitations no chest pain/no claudication/no peripheral edema/no orthopnea/no paroxysmal nocturnal dyspnea/no palpitations

## 2018-12-17 NOTE — H&P PST ADULT - NEGATIVE RESPIRATORY AND THORAX SYMPTOMS
no dyspnea/no cough/no wheezing no hemoptysis/no wheezing/no dyspnea/no pleuritic chest pain/no cough

## 2018-12-17 NOTE — H&P PST ADULT - PRIMARY CARE PROVIDER
Dr. Americo Pierre [phone] has pre op appointment tomorrow 2/16/18. Dr. Americo Pierre ([phone])

## 2018-12-17 NOTE — H&P PST ADULT - FAMILY HISTORY
Mother  Still living? No  Family history of CHF (congestive heart failure), Age at diagnosis: Age Unknown     Sibling  Still living? No  Family history of CHF (congestive heart failure), Age at diagnosis: Age Unknown  MI (myocardial infarction), Age at diagnosis: Age Unknown

## 2018-12-17 NOTE — H&P PST ADULT - PROBLEM SELECTOR PLAN 1
Right carotid endarterectomy   PST instructions provided, surgical scrub given, patient verbalized understanding.   CBC, BMP, T&S collected and send.  scheduled to see PCP tomorrow  cardiac eval in the chart , reviewed Repair ventral hernia with mesh on 1/2/19.

## 2018-12-17 NOTE — H&P PST ADULT - PROBLEM SELECTOR PLAN 2
Medical clearance needed as per surgeon. CBC, Comprehensive panel and EKG ordered. CT of Chest received in chart. Pre-op instructions and surgical scrubs given and pt verbalized understanding.

## 2018-12-17 NOTE — H&P PST ADULT - PMH
COPD (chronic obstructive pulmonary disease)  Bronchiectasis, recent PFT test was normal ( as per patient ), followed by Dr. Cheng  Deviated septum  septum perforation , spoke with ENT today , inconsequential , no issues  GERD (gastroesophageal reflux disease)  Moreno's esophagus  Hypercholesteremia    Hypertension    Stenosis of right carotid artery  severe COPD (chronic obstructive pulmonary disease)  Bronchiectasis, recent PFT test was normal ( as per patient ), followed by Dr. Cheng  Deviated septum  septum perforation , spoke with ENT today , inconsequential , no issues  GERD (gastroesophageal reflux disease)  Moreno's esophagus  Hypercholesteremia    Hypertension COPD (chronic obstructive pulmonary disease)  Bronchiectasis, recent PFT test was normal (as per patient ), followed by Dr. Cheng  Deviated septum  septum perforation , spoke with ENT today , inconsequential , no issues  GERD (gastroesophageal reflux disease)  Moreno's esophagus  Hypercholesteremia    Hypertension    Other and unspecified ventral hernia with obstruction, without gangrene COPD (chronic obstructive pulmonary disease)  Bronchiectasis, recent PFT test was normal (as per patient ), followed by Dr. Cheng  Deviated septum  septum perforation , spoke with ENT today , inconsequential , no issues  GERD (gastroesophageal reflux disease)  Moreno's esophagus  Hypercholesteremia    Hypertension    Other and unspecified ventral hernia with obstruction, without gangrene    Sleep apnea  information from Dr Pierre PCP 12/26/18

## 2018-12-17 NOTE — H&P PST ADULT - GENERAL COMMENTS
Pt with UR which resolved, s/p Z pack 2 weeks ago Pt with URI which resolved, s/p Z pack 2 weeks ago

## 2018-12-17 NOTE — H&P PST ADULT - PROBLEM SELECTOR PROBLEM 1
Stenosis of right carotid artery Other and unspecified ventral hernia with obstruction, without gangrene

## 2018-12-18 PROBLEM — J44.9 CHRONIC OBSTRUCTIVE PULMONARY DISEASE, UNSPECIFIED: Chronic | Status: ACTIVE | Noted: 2018-02-15

## 2018-12-18 PROBLEM — I65.21 OCCLUSION AND STENOSIS OF RIGHT CAROTID ARTERY: Chronic | Status: INACTIVE | Noted: 2018-02-15 | Resolved: 2018-12-17

## 2018-12-31 DIAGNOSIS — G47.30 SLEEP APNEA, UNSPECIFIED: ICD-10-CM

## 2019-01-01 ENCOUNTER — TRANSCRIPTION ENCOUNTER (OUTPATIENT)
Age: 74
End: 2019-01-01

## 2019-01-02 ENCOUNTER — INPATIENT (INPATIENT)
Facility: HOSPITAL | Age: 74
LOS: 0 days | Discharge: HOME | DRG: 355 | End: 2019-01-03
Attending: SURGERY | Admitting: SURGERY
Payer: COMMERCIAL

## 2019-01-02 ENCOUNTER — RESULT REVIEW (OUTPATIENT)
Age: 74
End: 2019-01-02

## 2019-01-02 VITALS
HEART RATE: 68 BPM | WEIGHT: 166.89 LBS | TEMPERATURE: 98 F | RESPIRATION RATE: 16 BRPM | HEIGHT: 66 IN | OXYGEN SATURATION: 95 % | SYSTOLIC BLOOD PRESSURE: 138 MMHG | DIASTOLIC BLOOD PRESSURE: 73 MMHG

## 2019-01-02 DIAGNOSIS — Z01.818 ENCOUNTER FOR OTHER PREPROCEDURAL EXAMINATION: ICD-10-CM

## 2019-01-02 DIAGNOSIS — Z98.890 OTHER SPECIFIED POSTPROCEDURAL STATES: Chronic | ICD-10-CM

## 2019-01-02 DIAGNOSIS — K43.6 OTHER AND UNSPECIFIED VENTRAL HERNIA WITH OBSTRUCTION, WITHOUT GANGRENE: ICD-10-CM

## 2019-01-02 DIAGNOSIS — Z98.1 ARTHRODESIS STATUS: Chronic | ICD-10-CM

## 2019-01-02 DIAGNOSIS — Z98.49 CATARACT EXTRACTION STATUS, UNSPECIFIED EYE: Chronic | ICD-10-CM

## 2019-01-02 LAB
ANION GAP SERPL CALC-SCNC: 10 MMOL/L — SIGNIFICANT CHANGE UP (ref 5–17)
BUN SERPL-MCNC: 23 MG/DL — SIGNIFICANT CHANGE UP (ref 7–23)
CALCIUM SERPL-MCNC: 8.9 MG/DL — SIGNIFICANT CHANGE UP (ref 8.5–10.1)
CHLORIDE SERPL-SCNC: 107 MMOL/L — SIGNIFICANT CHANGE UP (ref 96–108)
CO2 SERPL-SCNC: 27 MMOL/L — SIGNIFICANT CHANGE UP (ref 22–31)
CREAT SERPL-MCNC: 1.4 MG/DL — HIGH (ref 0.5–1.3)
GLUCOSE SERPL-MCNC: 128 MG/DL — HIGH (ref 70–99)
POTASSIUM SERPL-MCNC: 3.6 MMOL/L — SIGNIFICANT CHANGE UP (ref 3.5–5.3)
POTASSIUM SERPL-SCNC: 3.6 MMOL/L — SIGNIFICANT CHANGE UP (ref 3.5–5.3)
SODIUM SERPL-SCNC: 144 MMOL/L — SIGNIFICANT CHANGE UP (ref 135–145)

## 2019-01-02 PROCEDURE — 88305 TISSUE EXAM BY PATHOLOGIST: CPT | Mod: 26

## 2019-01-02 RX ORDER — CEFAZOLIN SODIUM 1 G
1000 VIAL (EA) INJECTION ONCE
Qty: 0 | Refills: 0 | Status: COMPLETED | OUTPATIENT
Start: 2019-01-02 | End: 2019-01-02

## 2019-01-02 RX ORDER — HYDROMORPHONE HYDROCHLORIDE 2 MG/ML
1 INJECTION INTRAMUSCULAR; INTRAVENOUS; SUBCUTANEOUS EVERY 4 HOURS
Qty: 0 | Refills: 0 | Status: DISCONTINUED | OUTPATIENT
Start: 2019-01-02 | End: 2019-01-03

## 2019-01-02 RX ORDER — ENOXAPARIN SODIUM 100 MG/ML
40 INJECTION SUBCUTANEOUS EVERY 24 HOURS
Qty: 0 | Refills: 0 | Status: DISCONTINUED | OUTPATIENT
Start: 2019-01-03 | End: 2019-01-03

## 2019-01-02 RX ORDER — SODIUM CHLORIDE 9 MG/ML
1000 INJECTION, SOLUTION INTRAVENOUS
Qty: 0 | Refills: 0 | Status: DISCONTINUED | OUTPATIENT
Start: 2019-01-02 | End: 2019-01-02

## 2019-01-02 RX ORDER — ATORVASTATIN CALCIUM 80 MG/1
80 TABLET, FILM COATED ORAL AT BEDTIME
Qty: 0 | Refills: 0 | Status: DISCONTINUED | OUTPATIENT
Start: 2019-01-02 | End: 2019-01-03

## 2019-01-02 RX ORDER — FENTANYL CITRATE 50 UG/ML
25 INJECTION INTRAVENOUS
Qty: 0 | Refills: 0 | Status: DISCONTINUED | OUTPATIENT
Start: 2019-01-02 | End: 2019-01-03

## 2019-01-02 RX ORDER — FLUTICASONE FUROATE AND VILANTEROL TRIFENATATE 100; 25 UG/1; UG/1
1 POWDER RESPIRATORY (INHALATION) DAILY
Qty: 0 | Refills: 0 | Status: DISCONTINUED | OUTPATIENT
Start: 2019-01-02 | End: 2019-01-03

## 2019-01-02 RX ORDER — METOCLOPRAMIDE HCL 10 MG
5 TABLET ORAL ONCE
Qty: 0 | Refills: 0 | Status: COMPLETED | OUTPATIENT
Start: 2019-01-02 | End: 2019-01-02

## 2019-01-02 RX ORDER — ALBUTEROL 90 UG/1
2 AEROSOL, METERED ORAL EVERY 6 HOURS
Qty: 0 | Refills: 0 | Status: DISCONTINUED | OUTPATIENT
Start: 2019-01-02 | End: 2019-01-03

## 2019-01-02 RX ORDER — OXYCODONE AND ACETAMINOPHEN 5; 325 MG/1; MG/1
1 TABLET ORAL EVERY 6 HOURS
Qty: 0 | Refills: 0 | Status: DISCONTINUED | OUTPATIENT
Start: 2019-01-02 | End: 2019-01-03

## 2019-01-02 RX ORDER — AMLODIPINE BESYLATE 2.5 MG/1
5 TABLET ORAL DAILY
Qty: 0 | Refills: 0 | Status: DISCONTINUED | OUTPATIENT
Start: 2019-01-02 | End: 2019-01-03

## 2019-01-02 RX ORDER — PANTOPRAZOLE SODIUM 20 MG/1
40 TABLET, DELAYED RELEASE ORAL
Qty: 0 | Refills: 0 | Status: DISCONTINUED | OUTPATIENT
Start: 2019-01-02 | End: 2019-01-03

## 2019-01-02 RX ORDER — HYDROCODONE BITARTRATE AND ACETAMINOPHEN 7.5; 325 MG/15ML; MG/15ML
2 SOLUTION ORAL
Qty: 30 | Refills: 0
Start: 2019-01-02

## 2019-01-02 RX ORDER — METOCLOPRAMIDE HCL 10 MG
10 TABLET ORAL ONCE
Qty: 0 | Refills: 0 | Status: DISCONTINUED | OUTPATIENT
Start: 2019-01-02 | End: 2019-01-03

## 2019-01-02 RX ORDER — CHLORTHALIDONE 50 MG
25 TABLET ORAL DAILY
Qty: 0 | Refills: 0 | Status: DISCONTINUED | OUTPATIENT
Start: 2019-01-02 | End: 2019-01-03

## 2019-01-02 RX ORDER — FENTANYL CITRATE 50 UG/ML
25 INJECTION INTRAVENOUS
Qty: 0 | Refills: 0 | Status: DISCONTINUED | OUTPATIENT
Start: 2019-01-02 | End: 2019-01-02

## 2019-01-02 RX ADMIN — FENTANYL CITRATE 25 MICROGRAM(S): 50 INJECTION INTRAVENOUS at 11:37

## 2019-01-02 RX ADMIN — FENTANYL CITRATE 25 MICROGRAM(S): 50 INJECTION INTRAVENOUS at 11:40

## 2019-01-02 RX ADMIN — FENTANYL CITRATE 25 MICROGRAM(S): 50 INJECTION INTRAVENOUS at 10:59

## 2019-01-02 RX ADMIN — OXYCODONE AND ACETAMINOPHEN 1 TABLET(S): 5; 325 TABLET ORAL at 21:36

## 2019-01-02 RX ADMIN — Medication 5 MILLIGRAM(S): at 15:10

## 2019-01-02 RX ADMIN — FENTANYL CITRATE 25 MICROGRAM(S): 50 INJECTION INTRAVENOUS at 14:52

## 2019-01-02 RX ADMIN — FENTANYL CITRATE 25 MICROGRAM(S): 50 INJECTION INTRAVENOUS at 11:27

## 2019-01-02 RX ADMIN — FENTANYL CITRATE 25 MICROGRAM(S): 50 INJECTION INTRAVENOUS at 11:21

## 2019-01-02 RX ADMIN — OXYCODONE AND ACETAMINOPHEN 1 TABLET(S): 5; 325 TABLET ORAL at 21:05

## 2019-01-02 RX ADMIN — FENTANYL CITRATE 25 MICROGRAM(S): 50 INJECTION INTRAVENOUS at 11:09

## 2019-01-02 RX ADMIN — FENTANYL CITRATE 25 MICROGRAM(S): 50 INJECTION INTRAVENOUS at 11:11

## 2019-01-02 RX ADMIN — FENTANYL CITRATE 25 MICROGRAM(S): 50 INJECTION INTRAVENOUS at 15:25

## 2019-01-02 RX ADMIN — SODIUM CHLORIDE 120 MILLILITER(S): 9 INJECTION, SOLUTION INTRAVENOUS at 10:58

## 2019-01-02 RX ADMIN — FENTANYL CITRATE 25 MICROGRAM(S): 50 INJECTION INTRAVENOUS at 13:10

## 2019-01-02 RX ADMIN — SODIUM CHLORIDE 60 MILLILITER(S): 9 INJECTION, SOLUTION INTRAVENOUS at 08:57

## 2019-01-02 NOTE — ASU DISCHARGE PLAN (ADULT/PEDIATRIC). - MEDICATION SUMMARY - MEDICATIONS TO TAKE
I will START or STAY ON the medications listed below when I get home from the hospital:    Norco 5 mg-325 mg oral tablet  -- 2 tab(s) by mouth every 4 to 6 hours, As Needed for pain MDD:10 tablets   -- Caution federal law prohibits the transfer of this drug to any person other  than the person for whom it was prescribed.  May cause drowsiness.  Alcohol may intensify this effect.  Use care when operating dangerous machinery.  This product contains acetaminophen.  Do not use  with any other product containing acetaminophen to prevent possible liver damage.  Using more of this medication than prescribed may cause serious breathing problems.    -- Indication: For pain    ROSUVASTATIN TAB 20MG  -- orally once a day  -- Indication: For cholesterol medication    Breo Ellipta 200 mcg-25 mcg/inh inhalation powder  -- 1 puff(s) inhaled once a day, As Needed  -- Indication: For breathing medication    ProAir HFA 90 mcg/inh inhalation aerosol  -- 2 puff(s) inhaled 4 times a day, As Needed  -- Indication: For breathing medication    amLODIPine 5 mg oral tablet  -- 1 tab(s) by mouth once a day  -- Indication: For blood pressure medication    chlorthalidone 25 mg oral tablet  -- 1 tab(s) by mouth once a day  -- Indication: For water pill    omeprazole 40 mg oral delayed release capsule  -- 1 cap(s) by mouth 2 times a day  -- Indication: For gastric reflux    Multiple Vitamins oral tablet  -- 1 tab(s) by mouth once a day  -- Indication: For supplement

## 2019-01-02 NOTE — ASU PATIENT PROFILE, ADULT - PMH
COPD (chronic obstructive pulmonary disease)  Bronchiectasis, recent PFT test was normal (as per patient ), followed by Dr. Cheng  Deviated septum  septum perforation , spoke with ENT today , inconsequential , no issues  GERD (gastroesophageal reflux disease)  Moreno's esophagus  Hypercholesteremia    Hypertension    Other and unspecified ventral hernia with obstruction, without gangrene    Sleep apnea  information from Dr Pierre PCP 12/26/18

## 2019-01-03 ENCOUNTER — TRANSCRIPTION ENCOUNTER (OUTPATIENT)
Age: 74
End: 2019-01-03

## 2019-01-03 VITALS
SYSTOLIC BLOOD PRESSURE: 124 MMHG | DIASTOLIC BLOOD PRESSURE: 68 MMHG | RESPIRATION RATE: 16 BRPM | TEMPERATURE: 98 F | OXYGEN SATURATION: 94 % | HEART RATE: 81 BPM

## 2019-01-03 LAB — SURGICAL PATHOLOGY STUDY: SIGNIFICANT CHANGE UP

## 2019-01-03 PROCEDURE — 49568: CPT

## 2019-01-03 PROCEDURE — 88305 TISSUE EXAM BY PATHOLOGIST: CPT

## 2019-01-03 PROCEDURE — 49255 REMOVAL OF OMENTUM: CPT

## 2019-01-03 PROCEDURE — 49561: CPT

## 2019-01-03 PROCEDURE — C1781: CPT

## 2019-01-03 PROCEDURE — 36415 COLL VENOUS BLD VENIPUNCTURE: CPT

## 2019-01-03 PROCEDURE — 80048 BASIC METABOLIC PNL TOTAL CA: CPT

## 2019-01-03 RX ADMIN — PANTOPRAZOLE SODIUM 40 MILLIGRAM(S): 20 TABLET, DELAYED RELEASE ORAL at 06:24

## 2019-01-03 RX ADMIN — OXYCODONE AND ACETAMINOPHEN 1 TABLET(S): 5; 325 TABLET ORAL at 08:03

## 2019-01-03 RX ADMIN — ENOXAPARIN SODIUM 40 MILLIGRAM(S): 100 INJECTION SUBCUTANEOUS at 08:06

## 2019-01-03 NOTE — DISCHARGE NOTE ADULT - HOSPITAL COURSE
73 year old male with PMH of HTN, HLD, COPD, GERD presented for elective ventral hernia repair with mesh on 1/2/19.  Patient tolerated procedure well, was admitted overnight for pain control.  On discharge patient's pain controlled, will be discharged with pain medication, and patient to follow up with Dr. Nguyen in the office as per office instructions.  As per Dr. Nguyen, patient is stable and cleared for discharge.

## 2019-01-03 NOTE — DISCHARGE NOTE ADULT - PLAN OF CARE
Wound healing, recovery from surgery 73 year old male s/p ventral hernia repair on 1/2/19.  Discharge home, follow up with Dr. Nguyen as per office instructions.

## 2019-01-03 NOTE — DISCHARGE NOTE ADULT - MEDICATION SUMMARY - MEDICATIONS TO TAKE
I will START or STAY ON the medications listed below when I get home from the hospital:    Norco 5 mg-325 mg oral tablet  -- 2 tab(s) by mouth every 4 to 6 hours, As Needed for pain MDD:10 tablets   -- Caution federal law prohibits the transfer of this drug to any person other  than the person for whom it was prescribed.  May cause drowsiness.  Alcohol may intensify this effect.  Use care when operating dangerous machinery.  This product contains acetaminophen.  Do not use  with any other product containing acetaminophen to prevent possible liver damage.  Using more of this medication than prescribed may cause serious breathing problems.    -- Indication: For pain    ROSUVASTATIN TAB 20MG  -- orally once a day  -- Indication: For high cholesterol    Breo Ellipta 200 mcg-25 mcg/inh inhalation powder  -- 1 puff(s) inhaled once a day, As Needed  -- Indication: For breathing medication    ProAir HFA 90 mcg/inh inhalation aerosol  -- 2 puff(s) inhaled 4 times a day, As Needed  -- Indication: For breathing medication    amLODIPine 5 mg oral tablet  -- 1 tab(s) by mouth once a day  -- Indication: For blood pressure    chlorthalidone 25 mg oral tablet  -- 1 tab(s) by mouth once a day  -- Indication: For water pill    omeprazole 40 mg oral delayed release capsule  -- 1 cap(s) by mouth 2 times a day  -- Indication: For gastric reflux    Multiple Vitamins oral tablet  -- 1 tab(s) by mouth once a day  -- Indication: For supplement

## 2019-01-03 NOTE — PROGRESS NOTE ADULT - SUBJECTIVE AND OBJECTIVE BOX
DEPARTMENT OF ANESTHESIA  POST ANESTHETIC EVALUATION    The Patient was interviewed and evaluated.  The patient is S/P a ventral hernia    Vital Signs Last 24 Hrs  T(C): 36.8 (03 Jan 2019 07:37), Max: 37.6 (03 Jan 2019 06:14)  T(F): 98.3 (03 Jan 2019 07:37), Max: 99.7 (03 Jan 2019 06:14)  HR: 81 (03 Jan 2019 07:37) (64 - 81)  BP: 124/68 (03 Jan 2019 07:37) (116/69 - 165/84)  BP(mean): --  RR: 16 (03 Jan 2019 07:37) (13 - 22)  SpO2: 94% (03 Jan 2019 07:37) (92% - 96%)    Evaluation:  The patient is doing  well    (x) No apparent complications or complaints regarding anesthesia care at this time  (x ) All questions were answered    Condition:  (x) Stable      ( ) Guarded      ( ) Critical    Recommendations:  (x ) None     ( ) Other:

## 2019-01-03 NOTE — DISCHARGE NOTE ADULT - PATIENT PORTAL LINK FT
You can access the [id number] Patient Portal, offered by [facility], by registering with the following website: [email or web address]

## 2019-01-03 NOTE — DISCHARGE NOTE ADULT - CARE PROVIDER_API CALL
Baltazar Nguyen (), Surgery  Baltazar [place] [address]  Phone: [phone]  Fax: [phone]

## 2019-01-03 NOTE — DISCHARGE NOTE ADULT - CARE PLAN
Principal Discharge DX:	Other and unspecified ventral hernia with obstruction, without gangrene  Goal:	Wound healing, recovery from surgery Principal Discharge DX:	Other and unspecified ventral hernia with obstruction, without gangrene  Goal:	Wound healing, recovery from surgery  Assessment and plan of treatment:	73 year old male s/p ventral hernia repair on 1/2/19.  Discharge home, follow up with Dr. Nguyen as per office instructions.

## 2019-01-03 NOTE — PROGRESS NOTE ADULT - SUBJECTIVE AND OBJECTIVE BOX
01-02-19 @ 07:01  -  01-03-19 @ 07:00  --------------------------------------------------------  IN: 940 mL / OUT: 750 mL / NET: 190 mL      T(C): 37.6 (01-03-19 @ 06:14), Max: 37.6 (01-03-19 @ 06:14)  HR: 73 (01-03-19 @ 06:14) (64 - 74)  BP: 117/68 (01-03-19 @ 06:14) (116/69 - 165/84)  RR: 16 (01-03-19 @ 06:14) (13 - 22)  SpO2: 92% (01-03-19 @ 06:14) (92% - 96%)  Post OP Day#1      Incision clean dry intact.  Tolerating diet.    Lungs-clear  Heart-RRR, no murmurs.            144  |  107  |  23  ----------------------------<  128<H>  3.6   |  27  |  1.40<H>    DC Home

## 2019-01-03 NOTE — DISCHARGE NOTE ADULT - ADDITIONAL INSTRUCTIONS
You may shower after 24 hours.  Do not remove steri-strips.  Follow up with Dr. Nguyen in the office as per office instructions. You may shower after 24 hours.  Do not remove steri-strips.  Follow up with Dr. Nguyen in the office as per office instructions.  Please contact your physician if you experience pain not relieved with medication, bleeding that won't stop, fever over 101F.  DO NOT DRIVE WHILE TAKING PAIN MEDICATION.

## 2019-02-05 PROBLEM — K43.6 OTHER AND UNSPECIFIED VENTRAL HERNIA WITH OBSTRUCTION, WITHOUT GANGRENE: Chronic | Status: ACTIVE | Noted: 2018-12-17

## 2019-02-08 ENCOUNTER — APPOINTMENT (OUTPATIENT)
Dept: HEPATOLOGY | Facility: CLINIC | Age: 74
End: 2019-02-08
Payer: MEDICARE

## 2019-02-08 PROCEDURE — 91200 LIVER ELASTOGRAPHY: CPT

## 2019-02-26 ENCOUNTER — APPOINTMENT (OUTPATIENT)
Dept: HEPATOLOGY | Facility: CLINIC | Age: 74
End: 2019-02-26

## 2019-12-26 ENCOUNTER — TRANSCRIPTION ENCOUNTER (OUTPATIENT)
Age: 74
End: 2019-12-26

## 2020-01-06 ENCOUNTER — TRANSCRIPTION ENCOUNTER (OUTPATIENT)
Age: 75
End: 2020-01-06

## 2020-03-06 ENCOUNTER — EMERGENCY (EMERGENCY)
Facility: HOSPITAL | Age: 75
LOS: 1 days | Discharge: HOME | End: 2020-03-06
Attending: EMERGENCY MEDICINE | Admitting: EMERGENCY MEDICINE
Payer: MEDICARE

## 2020-03-06 VITALS
TEMPERATURE: 98 F | SYSTOLIC BLOOD PRESSURE: 148 MMHG | RESPIRATION RATE: 18 BRPM | DIASTOLIC BLOOD PRESSURE: 86 MMHG | HEART RATE: 79 BPM | OXYGEN SATURATION: 96 %

## 2020-03-06 VITALS — WEIGHT: 171.96 LBS

## 2020-03-06 DIAGNOSIS — Z98.890 OTHER SPECIFIED POSTPROCEDURAL STATES: Chronic | ICD-10-CM

## 2020-03-06 DIAGNOSIS — Z98.1 ARTHRODESIS STATUS: Chronic | ICD-10-CM

## 2020-03-06 DIAGNOSIS — Z98.49 CATARACT EXTRACTION STATUS, UNSPECIFIED EYE: Chronic | ICD-10-CM

## 2020-03-06 LAB
ANION GAP SERPL CALC-SCNC: 8 MMOL/L — SIGNIFICANT CHANGE UP (ref 5–17)
APTT BLD: 28 SEC — LOW (ref 28.5–37)
BUN SERPL-MCNC: 17 MG/DL — SIGNIFICANT CHANGE UP (ref 7–23)
CALCIUM SERPL-MCNC: 9.3 MG/DL — SIGNIFICANT CHANGE UP (ref 8.5–10.1)
CHLORIDE SERPL-SCNC: 109 MMOL/L — HIGH (ref 96–108)
CO2 SERPL-SCNC: 27 MMOL/L — SIGNIFICANT CHANGE UP (ref 22–31)
CREAT SERPL-MCNC: 1.1 MG/DL — SIGNIFICANT CHANGE UP (ref 0.5–1.3)
GLUCOSE SERPL-MCNC: 90 MG/DL — SIGNIFICANT CHANGE UP (ref 70–99)
HCT VFR BLD CALC: 50.7 % — HIGH (ref 39–50)
HGB BLD-MCNC: 17.3 G/DL — HIGH (ref 13–17)
INR BLD: 0.92 RATIO — SIGNIFICANT CHANGE UP (ref 0.88–1.16)
MCHC RBC-ENTMCNC: 30.9 PG — SIGNIFICANT CHANGE UP (ref 27–34)
MCHC RBC-ENTMCNC: 34.1 GM/DL — SIGNIFICANT CHANGE UP (ref 32–36)
MCV RBC AUTO: 90.7 FL — SIGNIFICANT CHANGE UP (ref 80–100)
NRBC # BLD: 0 /100 WBCS — SIGNIFICANT CHANGE UP (ref 0–0)
PLATELET # BLD AUTO: 230 K/UL — SIGNIFICANT CHANGE UP (ref 150–400)
POTASSIUM SERPL-MCNC: 3.6 MMOL/L — SIGNIFICANT CHANGE UP (ref 3.5–5.3)
POTASSIUM SERPL-SCNC: 3.6 MMOL/L — SIGNIFICANT CHANGE UP (ref 3.5–5.3)
PROTHROM AB SERPL-ACNC: 10.3 SEC — SIGNIFICANT CHANGE UP (ref 10–12.9)
RBC # BLD: 5.59 M/UL — SIGNIFICANT CHANGE UP (ref 4.2–5.8)
RBC # FLD: 14.6 % — HIGH (ref 10.3–14.5)
SODIUM SERPL-SCNC: 144 MMOL/L — SIGNIFICANT CHANGE UP (ref 135–145)
WBC # BLD: 7.4 K/UL — SIGNIFICANT CHANGE UP (ref 3.8–10.5)
WBC # FLD AUTO: 7.4 K/UL — SIGNIFICANT CHANGE UP (ref 3.8–10.5)

## 2020-03-06 PROCEDURE — 80048 BASIC METABOLIC PNL TOTAL CA: CPT

## 2020-03-06 PROCEDURE — 99284 EMERGENCY DEPT VISIT MOD MDM: CPT | Mod: 25

## 2020-03-06 PROCEDURE — 85610 PROTHROMBIN TIME: CPT

## 2020-03-06 PROCEDURE — 74019 RADEX ABDOMEN 2 VIEWS: CPT | Mod: 26

## 2020-03-06 PROCEDURE — 36415 COLL VENOUS BLD VENIPUNCTURE: CPT

## 2020-03-06 PROCEDURE — 85027 COMPLETE CBC AUTOMATED: CPT

## 2020-03-06 PROCEDURE — 86900 BLOOD TYPING SEROLOGIC ABO: CPT

## 2020-03-06 PROCEDURE — 99284 EMERGENCY DEPT VISIT MOD MDM: CPT

## 2020-03-06 PROCEDURE — 74019 RADEX ABDOMEN 2 VIEWS: CPT

## 2020-03-06 PROCEDURE — 86901 BLOOD TYPING SEROLOGIC RH(D): CPT

## 2020-03-06 PROCEDURE — 86850 RBC ANTIBODY SCREEN: CPT

## 2020-03-06 PROCEDURE — 85730 THROMBOPLASTIN TIME PARTIAL: CPT

## 2020-03-06 RX ORDER — SODIUM CHLORIDE 9 MG/ML
1000 INJECTION, SOLUTION INTRAVENOUS
Refills: 0 | Status: DISCONTINUED | OUTPATIENT
Start: 2020-03-06 | End: 2020-03-11

## 2020-03-06 NOTE — ED PROVIDER NOTE - PHYSICAL EXAMINATION
GENERAL:  well appearing, non-toxic male in no acute distress  SKIN: skin warm, pink and dry. MMM.   PULM: CTAB. Normal respiratory effort. No respiratory distress. No wheezes, stridor, rales or rhonchi. No retractions  CV: RRR, no M/R/G.   ABD: Soft, non-tender, non-distended. No rebound or guarding. No CVA tenderness.  : + right reducible inguinal hernia.   MSK: moving all extremities. No edema, erythema, cyanosis. Distal pulses intact.  NEURO: A+Ox3, no sensory/motor deficits  PSYCH: appropriate behavior, cooperative.

## 2020-03-06 NOTE — H&P ADULT - NSICDXFAMILYHX_GEN_ALL_CORE_FT
FAMILY HISTORY:  Sibling  Still living? No  Family history of CHF (congestive heart failure), Age at diagnosis: Age Unknown  MI (myocardial infarction), Age at diagnosis: Age Unknown

## 2020-03-06 NOTE — ED PROVIDER NOTE - CLINICAL SUMMARY MEDICAL DECISION MAKING FREE TEXT BOX
Patient here for right sided groin pain consistent with his chronic right inguinal hernia. Hernia reducible on exam. Labs and xrays unremarkable, cleared by surgery for outpatient follow up. Patient understands return precautions for worsening symptoms.

## 2020-03-06 NOTE — ED PROVIDER NOTE - NSFOLLOWUPINSTRUCTIONS_ED_ALL_ED_FT
Follow up with your surgeon.    Inguinal Hernia    WHAT YOU NEED TO KNOW:    An inguinal hernia happens when organs or abdominal tissue push through a weak spot in the abdominal wall. The abdominal wall is made of fat and muscle. It holds the intestines in place. The hernia may contain fluid, tissue from the abdomen, or part of an organ (such as an intestine).Inguinal Hernia         DISCHARGE INSTRUCTIONS:    Return to the emergency department if:     You have severe abdominal pain with nausea and vomiting.       Your abdomen is larger than usual.       Your hernia gets bigger or is purple or blue.       You see blood in your bowel movements.      You feel weak, dizzy, or faint.     Contact your healthcare provider if:     You have a fever.      You have questions or concerns about your condition or care.    Medicine: You may need the following:     NSAIDs, such as ibuprofen, help decrease swelling, pain, and fever. NSAIDs can cause stomach bleeding or kidney problems in certain people. If you take blood thinner medicine, always ask your healthcare provider if NSAIDs are safe for you. Always read the medicine label and follow directions.      Take your medicine as directed. Contact your healthcare provider if you think your medicine is not helping or if you have side effects. Tell him or her if you are allergic to any medicine. Keep a list of the medicines, vitamins, and herbs you take. Include the amounts, and when and why you take them. Bring the list or the pill bottles to follow-up visits. Carry your medicine list with you in case of an emergency.    Follow up with your healthcare provider as directed: Write down your questions so you remember to ask them during your visits.    Manage your symptoms and prevent another hernia:     Do not lift anything heavy. Heavy lifting can make your hernia worse or cause another hernia. Ask your healthcare provider how much is safe for you to lift.       Drink liquids as directed. Liquids may prevent constipation and straining during a bowel movement. Ask how much liquid to drink each day and which liquids are best for you.       Eat foods high in fiber. Fiber may prevent constipation and straining during a bowel movement. Foods that contain fiber include fruits, vegetables, beans, lentils, and whole grains.       Maintain a healthy weight. If you are overweight, weight loss may prevent your hernia from getting worse. It may also prevent another hernia. Talk to your healthcare provider about exercise and how to lose weight safely if you are overweight.       Do not smoke. Nicotine and other chemicals in cigarettes and cigars can weaken the abdominal wall. This may increase your risk for another hernia. Ask your healthcare provider for information if you currently smoke and need help to quit. E-cigarettes or smokeless tobacco still contain nicotine. Talk to your healthcare provider before you use these products.          WHAT YOU NEED TO KNOW:    Constipation is when you have hard, dry bowel movements, or you go longer than usual between bowel movements.     DISCHARGE INSTRUCTIONS:    Call your doctor if:     You have blood in your bowel movements.      You have a fever and abdominal pain with the constipation.      Your constipation gets worse.       You start to vomit.      You have questions or concerns about your condition or care.    Medicines:     Medicine such as a laxative may help relax and loosen your intestines to help you have a bowel movement. Your provider may recommend you only use laxatives for a short time. Long-term use may make your bowels dependent on the medicine.      Take your medicine as directed. Contact your healthcare provider if you think your medicine is not helping or if you have side effects. Tell him of her if you are allergic to any medicine. Keep a list of the medicines, vitamins, and herbs you take. Include the amounts, and when and why you take them. Bring the list or the pill bottles to follow-up visits. Carry your medicine list with you in case of an emergency.    Relieve constipation:     A suppository may be used to help soften your bowel movements. This may make them easier to pass. A suppository is guided into your rectum through your anus.Suppository for Constipation           An enema is liquid medicine used to clear bowel movement from your rectum. The medicine is put into your rectum through your anus.Enemas         Prevent constipation:     Drink liquids as directed. You may need to drink extra liquids to help soften and move your bowels. Ask how much liquid to drink each day and which liquids are best for you.       Eat high-fiber foods. This may help decrease constipation by adding bulk to your bowel movements. High-fiber foods include fruit, vegetables, whole-grain breads and cereals, and beans. Your healthcare provider or dietitian can help you create a high-fiber meal plan. Your provider may also recommend a fiber supplement if you cannot get enough fiber from food.            Exercise regularly. Regular physical activity can help stimulate your intestines. Walking is a good exercise to prevent or relieve constipation. Ask which exercises are best for you.Walking for Exercise           Schedule a time each day to have a bowel movement. This may help train your body to have regular bowel movements. Bend forward while you are on the toilet to help move the bowel movement out. Sit on the toilet for at least 10 minutes, even if you do not have a bowel movement.       Talk to your healthcare provider about your medicines. Certain medicines, such as opioids, can cause constipation. Your provider may be able to make medicine changes. For example, he or she may change the kind of medicine, or change when you take it.    Follow up with your healthcare provider as directed: Write down your questions so you remember to ask them during your visits.        © Copyright [employer] 2020       back to top                      © Copyright [employer] 2020

## 2020-03-06 NOTE — H&P ADULT - HISTORY OF PRESENT ILLNESS
76 y/o M pmh HTN, HLD, right inguinal hernia, presents to [place] ED c/o sudden onset of sharp right sided "groin" pain, since earlier today. PT rated the pain a 7/10 on onset, however states it has since decreased. PT states the pain was radiating to his abdomen and right testicle. PT also admits to feeling more "bloated" than usual. Pt states his last bowel movement was 2 days ago, and he usually has one once a day, and states he has not been passing any flatus. Pt denies any N/V, fevers, chills, chest pain, palpitations.     DURATION: 1 day  LOCATION; Right groin  SEVERITY: 7/10  MODIFYING FACTORS: None      PAST MEDICAL & SURGICAL HISTORY:  Sleep apnea: information from Dr Pierre PCP 12/26/18  Other and unspecified ventral hernia with obstruction, without gangrene  Deviated septum: septum perforation , spoke with ENT today , inconsequential , no issues  GERD (gastroesophageal reflux disease): Moreno&#x27;s esophagus  COPD (chronic obstructive pulmonary disease): Bronchiectasis, recent PFT test was normal (as per patient ), followed by Dr. Cheng  Hypercholesteremia  Hypertension  S/P carotid endarterectomy: (Right, 2/2018)  H/O colonoscopy: 2015  H/O spinal fusion: 2000  rods cervical and lumbar spine  History of cataract surgery: (Bilateral eyes)  History of tonsillectomy: (Age 12)

## 2020-03-06 NOTE — H&P ADULT - NSICDXPASTSURGICALHX_GEN_ALL_CORE_FT
PAST SURGICAL HISTORY:  H/O colonoscopy 2015    H/O spinal fusion 2000  rods cervical and lumbar spine    History of cataract surgery (Bilateral eyes)    History of tonsillectomy (Age 12)    S/P carotid endarterectomy (Right, 2/2018)

## 2020-03-06 NOTE — H&P ADULT - NSICDXPASTMEDICALHX_GEN_ALL_CORE_FT
PAST MEDICAL HISTORY:  COPD (chronic obstructive pulmonary disease) Bronchiectasis, recent PFT test was normal (as per patient ), followed by Dr. Cheng    Deviated septum septum perforation , spoke with ENT today , inconsequential , no issues    GERD (gastroesophageal reflux disease) Moreno's esophagus    Hypercholesteremia     Hypertension     Other and unspecified ventral hernia with obstruction, without gangrene     Sleep apnea information from Dr Pierre PCP 12/26/18

## 2020-03-06 NOTE — ED PROVIDER NOTE - ATTENDING CONTRIBUTION TO CARE
I have personally performed a face to face diagnostic evaluation on this patient.  I have reviewed the PA note and agree with the history, exam, and plan of care, except as noted.  History and Exam by me shows patient c/o right groin pain, has history of chronic inguinal hernia, pain worsened in the last few days and came to ER, no fever, no vomiting, states he feels constipated, last bm was 2 days ago, patient in no acute distress, heart and lungs clear, abdomen soft, non tender, right reducible inguinal hernia, tender to palpation, call surgery, labs, xrays.

## 2020-03-06 NOTE — H&P ADULT - NSHPREVIEWOFSYSTEMS_GEN_ALL_CORE
REVIEW OF SYSTEMS:    CONSTITUTIONAL: No weakness, fevers or chills  EYES/ENT: No visual changes;  No vertigo or throat pain   NECK: No pain or stiffness  RESPIRATORY: No cough, wheezing, hemoptysis; No shortness of breath  CARDIOVASCULAR: No chest pain or palpitations  GASTROINTESTINAL: SEE HPI  GENITOURINARY: No dysuria, frequency or hematuria  NEUROLOGICAL: No numbness or weakness  SKIN: No itching, burning, rashes, or lesions   All other review of systems is negative unless indicated above.

## 2020-03-06 NOTE — CONSULT NOTE ADULT - SUBJECTIVE AND OBJECTIVE BOX
Patient is a 75y old  Male who presents with a chief complaint of right inguinal pain and right testalgia for several days,  NO N/V/D/C.  Ate this am states he's always constipated but is passing gas  Has a known RIH has been scheduled for surgery multiple times and has cancelled.      DURATION: several days  LOCATION; right inguinal region, right testalgia  SEVERITY: mild  MODIFYING FACTORS: none      PAST MEDICAL & SURGICAL HISTORY:    Sleep apnea: information from Dr Pierre PCP 18  Other and unspecified ventral hernia with obstruction, without gangrene  Deviated septum  GERD (gastroesophageal reflux disease): Moreno&#x27;s esophagus  COPD (chronic obstructive pulmonary disease): Bronchiectasis, recent PFT test was normal (as per patient ), followed by Dr. Cheng  Hypercholesteremia  Hypertension    S/P carotid endarterectomy: (Right, 2018)  H/O colonoscopy:   H/O spinal fusion: 2000  rods cervical and lumbar spine  History of cataract surgery: (Bilateral eyes)  History of tonsillectomy: (Age 12)      MEDICATIONS  (STANDING):  dextrose 5% + sodium chloride 0.45%. 1000 milliLiter(s) (65 mL/Hr) IV Continuous <Continuous>    MEDICATIONS  (PRN):      dextrose 5% + sodium chloride 0.45%. 1000 milliLiter(s) IV Continuous <Continuous>      diphtheria-tetanus toxoids (Anaphylaxis)  doxycycline (Anaphylaxis)  flu vaccines (Hives)  MethylPREDNISolone Sodium Succinate (Anaphylaxis)  Novocain (Anaphylaxis)  penicillin (Anaphylaxis)      SOCIAL HISTORY: ***  Tobacco Use- 25 pk/yr quit   Alcohol Use- denies  Occupation- Retired    FAMILY HISTORY:  MI (myocardial infarction) (Sibling)  Family history of CHF (congestive heart failure) (Sibling)     Father- 80's ALS  Mother- 85 CHF    Vital Signs Last 24 Hrs  T(C): --  T(F): --  HR: --  BP: --  BP(mean): --  RR: --  SpO2: --    ROS  General: No acute distress, awake and alert  Head: Normal cephalic/atraumatic  Neck: Denies neck pain or palpable masses, hoarseness or stridor  Lymphatic: Denies cervical or axillary or femoral lymphadenopathy  Chest: No chest pain, cough or hemoptysis  Heart: No chest pain, palpitations  Abdomen: No abdominal distention, nausea or vomiting, no abdominal pain  Extremities: Denies cyanosis, open sores or swollen extremity  Neuro: Denies weakness, paralysis, syncope, loss of vision  Psych: Denies hallucinations, visual disturbances, or depression    PHYSICAL EXAM  General: No acute distress, appears comfortable, conversant, well nourished  Head, Eyes, Ears, Nose, Throat: Normal cephalic/atraumatic, JUSTIN, EOMI, , anicteric, conjunctiva non injected and moist, vision grossly intact, hearing grossly intact, no nasal discharge, ears and nose symmetrical and atraumatic.  Nasal, oral, and oropharyngeal mucosa pink moist with no evidence of ulceration  Neck: Supple, carotids have good upstroke, trachea in the midline, without JVD or thyromegaly  Lymphatic: No evidence of masses or lymphadenopathy in the head, neck, trunk, axillary, inguinal, or supraclavicular regions  Chest: Lungs are clear to P&A, no wheezing, no rales, no ronchi, with good inspiratory effort  Heart: Heart rhythm regular, no murmurs  Extremity: No swelling, or open sores, no gross deformities,  good range of motion, no edema,  negative Ann's sign  negative, no lymphadenopathy  Neuro: Alert and oriented x3, motor and sensory intact  Psychiatric: Awake , alert, oriented x3 with an appropriate affect.   Skin: Good color, turgor, texture with no gross lesions, no eruptions, no rashes, no subcutaneous nodules and normal temperature.     Abdomen: Soft, non tender, good bowel sounds present in all four quadrants.  No guarding, rebound, and no peritoneal signs.  No evidence of hepatosplenomegaly.  No evidence of abdominal wall hernias.  Inguinal regions are unremarkable with no evidence of hernias.     LABS: ALL [id number] STUDIES WERE REVIEWED IN THEIR ENTIRETY                        17.3   7.40  )-----------( 230      ( 06 Mar 2020 16:48 )             50.7     03-    144  |  109<H>  |  17  ----------------------------<  90  3.6   |  27  |  1.10    Ca    9.3      06 Mar 2020 16:48            RADIOLOGY & ADDITIONAL STUDIES:  ALL RADIOLOGIC STUDIES WERE REVIEWED AND DISCUSSED WITH THE RADIOLOGIST    < from: Xray Abdomen 2 View PORTABLE -Urgent (20 @ 16:57) >  EXAM:  XR ABDOMEN PORTABLE URGENT 2V                            PROCEDURE DATE:  2020          INTERPRETATION:  Abdomen radiographs      CPT 99650    CLINICAL INFORMATION: Abdominal pain of unknown severity x3 days.    TECHNIQUE:  Supine and upright views of the abdomen were obtained.    FINDINGS:   Prior study dated 2012 was available for review.    Degenerative changes are noted in the visualized thoracolumbar spine. Metallic fixation hardware is seen overlying the lumbar spine. Visualized hardware appears intact. Surgical clips are seen in the left lower abdomen. Nonspecific bowel gas pattern with air and feces is noted. No abnormal air-fluid levels are seen. Stable phlebolith is noted in the left lower pelvis.          IMPRESSION:  Nonspecific bowel gas pattern.    < end of copied text >        IMPRESSIONS: RIH non incarcerated right testalgia with a negative clinical exam.        PLAN:  Patient can be discharged and followup in by office.

## 2020-03-06 NOTE — H&P ADULT - ASSESSMENT
Assessment:  74 y/o with hx of htn, hld, right inguinal hernia, with sudden onset of increased right groin pain and palpable bulge on exam, with no recent BM's and lack of flatus, 2/2 possible incarcerated inguinal hernia,    Plan:  - f/u flat and upright axr  - f/u labs  - NPO, IVF  - poss OR planning?? for right inguinal hernia repair  - discussed with Dr. Nguyen

## 2020-03-06 NOTE — ED PROVIDER NOTE - OBJECTIVE STATEMENT
74 y/o male with h/o HTN, HLD, right inguinal hernia, presents to ED c/o sharp right sided "groin" pain, since earlier today. Patient is concerned pain is related to his hernia. Pain was worse earlier tonight, now improving. Pain is constant with radiation to right testicle. Associated with constipation and abdominal bloating, last BM was 2 days ago, + flatus. Patient has seen evaluated by Dr. Nguyen, apparently had surgery scheduled numerous times for hernia repair however patient cancelled. Patient denies any N/V, fevers, chills, chest pain, palpitations, urinary sxs, flank pain.

## 2020-03-06 NOTE — ED PROVIDER NOTE - PATIENT PORTAL LINK FT
You can access the FollowMyHealth Patient Portal offered by [facility] by registering at the following website: [email or web address]. By joining [facility]’s FollowMyHealth portal, you will also be able to view your health information using other applications (apps) compatible with our system.

## 2020-03-06 NOTE — ED PROVIDER NOTE - NS ED ROS FT
Constitutional: no fever, chills  Cardiovascular: no chest pain, no sob, no syncope   Respiratory: no cough, no shortness of breath, no h/o asthma or COPD.  Gastrointestinal: + right sided lower abd pain. no nausea, vomiting or diarrhea.   : + right groin pain into right testicle. no urinary sxs, no flank pain.  Musculoskeletal: no msk pain or injury   Integumentary: no rash or skin changes. no edema  Neurological: no headache, no dizziness, no visual changes, no UE/LE weakness or paresthesias.

## 2020-03-06 NOTE — H&P ADULT - NSHPPHYSICALEXAM_GEN_ALL_CORE
PHYSICAL EXAM:  GENERAL: NAD, well-developed  HEAD:  Atraumatic, Normocephalic  CHEST/LUNG: CTABL, no ronchi, rales or wheezing  HEART: RRR, no MRG  ABDOMEN: soft, distended, mild diffuse ttp, hypoactive bowel sounds throughout all 4 quadrants, right groin with mildly tender palpable bugle  EXTREMITIES:  2+ Peripheral Pulses, No clubbing, cyanosis, or edema  NEUROLOGY: non-focal  SKIN: No rashes or lesions

## 2020-03-06 NOTE — ED PROVIDER NOTE - CARE PROVIDER_API CALL
Baltazar Nguyen ()  Surgery  Baltazar Nguyen [address] , [place] [address]  Phone: [phone]  Fax: [phone]  Follow Up Time:

## 2020-03-06 NOTE — ED PROVIDER NOTE - PROGRESS NOTE DETAILS
Patient evaluated by surgery Dr. Nguyen at bedside, recommends labs, abdominal xray. If labs and imaging are unremarkable patient can be discharged with outpatient follow up.

## 2021-05-03 ENCOUNTER — APPOINTMENT (OUTPATIENT)
Dept: FAMILY MEDICINE | Facility: CLINIC | Age: 76
End: 2021-05-03
Payer: MEDICARE

## 2021-05-03 VITALS
HEIGHT: 65 IN | SYSTOLIC BLOOD PRESSURE: 140 MMHG | HEART RATE: 84 BPM | BODY MASS INDEX: 26.66 KG/M2 | TEMPERATURE: 98.5 F | DIASTOLIC BLOOD PRESSURE: 75 MMHG | WEIGHT: 160 LBS | OXYGEN SATURATION: 97 %

## 2021-05-03 DIAGNOSIS — Z86.39 PERSONAL HISTORY OF OTHER ENDOCRINE, NUTRITIONAL AND METABOLIC DISEASE: ICD-10-CM

## 2021-05-03 DIAGNOSIS — Z86.79 PERSONAL HISTORY OF OTHER DISEASES OF THE CIRCULATORY SYSTEM: ICD-10-CM

## 2021-05-03 DIAGNOSIS — Z00.00 ENCOUNTER FOR GENERAL ADULT MEDICAL EXAMINATION W/OUT ABNORMAL FINDINGS: ICD-10-CM

## 2021-05-03 PROCEDURE — 36415 COLL VENOUS BLD VENIPUNCTURE: CPT

## 2021-05-03 PROCEDURE — 99203 OFFICE O/P NEW LOW 30 MIN: CPT | Mod: 25

## 2021-05-03 RX ORDER — FAMOTIDINE 20 MG/1
20 TABLET, FILM COATED ORAL
Qty: 30 | Refills: 0 | Status: ACTIVE | COMMUNITY
Start: 2021-02-17

## 2021-05-04 LAB
25(OH)D3 SERPL-MCNC: 37.3 NG/ML
ALBUMIN SERPL ELPH-MCNC: 4.3 G/DL
ALP BLD-CCNC: 117 U/L
ALT SERPL-CCNC: <5 U/L
ANION GAP SERPL CALC-SCNC: 16 MMOL/L
APPEARANCE: CLEAR
AST SERPL-CCNC: 20 U/L
BACTERIA: NEGATIVE
BASOPHILS # BLD AUTO: 0.06 K/UL
BASOPHILS NFR BLD AUTO: 0.7 %
BILIRUB SERPL-MCNC: 0.6 MG/DL
BILIRUBIN URINE: NEGATIVE
BLOOD URINE: NEGATIVE
BUN SERPL-MCNC: 13 MG/DL
CALCIUM SERPL-MCNC: 9.7 MG/DL
CHLORIDE SERPL-SCNC: 106 MMOL/L
CHOLEST SERPL-MCNC: 186 MG/DL
CO2 SERPL-SCNC: 26 MMOL/L
COLOR: YELLOW
CREAT SERPL-MCNC: 1.23 MG/DL
EOSINOPHIL # BLD AUTO: 0.2 K/UL
EOSINOPHIL NFR BLD AUTO: 2.4 %
ESTIMATED AVERAGE GLUCOSE: 137 MG/DL
GLUCOSE QUALITATIVE U: NEGATIVE
GLUCOSE SERPL-MCNC: 134 MG/DL
HBA1C MFR BLD HPLC: 6.4 %
HCT VFR BLD CALC: 48.2 %
HDLC SERPL-MCNC: 45 MG/DL
HGB BLD-MCNC: 15.8 G/DL
HYALINE CASTS: 3 /LPF
IMM GRANULOCYTES NFR BLD AUTO: 0.5 %
KETONES URINE: NEGATIVE
LDLC SERPL CALC-MCNC: 100 MG/DL
LEUKOCYTE ESTERASE URINE: NEGATIVE
LYMPHOCYTES # BLD AUTO: 1.57 K/UL
LYMPHOCYTES NFR BLD AUTO: 18.7 %
MAN DIFF?: NORMAL
MCHC RBC-ENTMCNC: 31.4 PG
MCHC RBC-ENTMCNC: 32.8 GM/DL
MCV RBC AUTO: 95.8 FL
MICROSCOPIC-UA: NORMAL
MONOCYTES # BLD AUTO: 0.99 K/UL
MONOCYTES NFR BLD AUTO: 11.8 %
NEUTROPHILS # BLD AUTO: 5.53 K/UL
NEUTROPHILS NFR BLD AUTO: 65.9 %
NITRITE URINE: NEGATIVE
NONHDLC SERPL-MCNC: 141 MG/DL
PH URINE: 6.5
PLATELET # BLD AUTO: 239 K/UL
POTASSIUM SERPL-SCNC: 3.8 MMOL/L
PROT SERPL-MCNC: 6.9 G/DL
PROTEIN URINE: ABNORMAL
PSA SERPL-MCNC: 3.33 NG/ML
RBC # BLD: 5.03 M/UL
RBC # FLD: 15.2 %
RED BLOOD CELLS URINE: 1 /HPF
SODIUM SERPL-SCNC: 148 MMOL/L
SPECIFIC GRAVITY URINE: 1.02
SQUAMOUS EPITHELIAL CELLS: 1 /HPF
TRIGL SERPL-MCNC: 202 MG/DL
TSH SERPL-ACNC: 0.55 UIU/ML
UROBILINOGEN URINE: ABNORMAL
WBC # FLD AUTO: 8.39 K/UL
WHITE BLOOD CELLS URINE: 1 /HPF

## 2021-05-24 ENCOUNTER — APPOINTMENT (OUTPATIENT)
Dept: FAMILY MEDICINE | Facility: CLINIC | Age: 76
End: 2021-05-24
Payer: MEDICARE

## 2021-05-24 VITALS
OXYGEN SATURATION: 98 % | BODY MASS INDEX: 26.66 KG/M2 | TEMPERATURE: 98.5 F | SYSTOLIC BLOOD PRESSURE: 120 MMHG | HEART RATE: 84 BPM | WEIGHT: 160 LBS | RESPIRATION RATE: 16 BRPM | DIASTOLIC BLOOD PRESSURE: 82 MMHG | HEIGHT: 65 IN

## 2021-05-24 DIAGNOSIS — R60.0 LOCALIZED EDEMA: ICD-10-CM

## 2021-05-24 PROCEDURE — 99213 OFFICE O/P EST LOW 20 MIN: CPT

## 2021-05-28 ENCOUNTER — RX RENEWAL (OUTPATIENT)
Age: 76
End: 2021-05-28

## 2021-06-24 ENCOUNTER — APPOINTMENT (OUTPATIENT)
Dept: FAMILY MEDICINE | Facility: CLINIC | Age: 76
End: 2021-06-24
Payer: MEDICARE

## 2021-06-24 VITALS
HEIGHT: 65 IN | HEART RATE: 71 BPM | DIASTOLIC BLOOD PRESSURE: 70 MMHG | OXYGEN SATURATION: 97 % | BODY MASS INDEX: 26.66 KG/M2 | SYSTOLIC BLOOD PRESSURE: 132 MMHG | WEIGHT: 160 LBS

## 2021-06-24 PROCEDURE — 99213 OFFICE O/P EST LOW 20 MIN: CPT

## 2021-06-24 RX ORDER — HYDROCHLOROTHIAZIDE 12.5 MG/1
12.5 TABLET ORAL
Qty: 30 | Refills: 0 | Status: DISCONTINUED | COMMUNITY
Start: 2021-04-08 | End: 2021-06-24

## 2021-08-25 ENCOUNTER — RX RENEWAL (OUTPATIENT)
Age: 76
End: 2021-08-25

## 2021-08-31 ENCOUNTER — APPOINTMENT (OUTPATIENT)
Dept: FAMILY MEDICINE | Facility: CLINIC | Age: 76
End: 2021-08-31
Payer: MEDICARE

## 2021-08-31 VITALS
HEART RATE: 63 BPM | SYSTOLIC BLOOD PRESSURE: 130 MMHG | HEIGHT: 65 IN | DIASTOLIC BLOOD PRESSURE: 80 MMHG | TEMPERATURE: 98.2 F | BODY MASS INDEX: 26.66 KG/M2 | WEIGHT: 160 LBS | OXYGEN SATURATION: 99 %

## 2021-08-31 PROCEDURE — 99214 OFFICE O/P EST MOD 30 MIN: CPT | Mod: 25

## 2021-08-31 PROCEDURE — 36415 COLL VENOUS BLD VENIPUNCTURE: CPT

## 2021-08-31 RX ORDER — TAMSULOSIN HYDROCHLORIDE 0.4 MG/1
0.4 CAPSULE ORAL
Qty: 30 | Refills: 0 | Status: DISCONTINUED | COMMUNITY
Start: 2021-06-24 | End: 2021-08-31

## 2021-08-31 NOTE — ED ADULT NURSE NOTE - NS_SISCREENINGSR_GEN_ALL_ED
pt a&o x3 pt biba for trip and fall at home with r leg deformity. NO head injury no lOC.  skin tear r elbow. R hip internal rotated and shortened. Pain to r hip, sensation intact. No blood thinner use. PMH CKD, HTN Negative

## 2021-09-01 LAB
ALBUMIN SERPL ELPH-MCNC: 4.3 G/DL
ALP BLD-CCNC: 90 U/L
ALT SERPL-CCNC: <5 U/L
ANION GAP SERPL CALC-SCNC: 13 MMOL/L
AST SERPL-CCNC: 17 U/L
BASOPHILS # BLD AUTO: 0.04 K/UL
BASOPHILS NFR BLD AUTO: 0.7 %
BILIRUB SERPL-MCNC: 0.5 MG/DL
BUN SERPL-MCNC: 16 MG/DL
CALCIUM SERPL-MCNC: 9.5 MG/DL
CHLORIDE SERPL-SCNC: 107 MMOL/L
CHOLEST SERPL-MCNC: 248 MG/DL
CO2 SERPL-SCNC: 25 MMOL/L
CREAT SERPL-MCNC: 1.27 MG/DL
EOSINOPHIL # BLD AUTO: 0.15 K/UL
EOSINOPHIL NFR BLD AUTO: 2.6 %
GLUCOSE SERPL-MCNC: 99 MG/DL
HCT VFR BLD CALC: 50.2 %
HDLC SERPL-MCNC: 42 MG/DL
HGB BLD-MCNC: 16.3 G/DL
IMM GRANULOCYTES NFR BLD AUTO: 0.3 %
LDLC SERPL CALC-MCNC: 176 MG/DL
LYMPHOCYTES # BLD AUTO: 1.49 K/UL
LYMPHOCYTES NFR BLD AUTO: 25.4 %
MAN DIFF?: NORMAL
MCHC RBC-ENTMCNC: 31.9 PG
MCHC RBC-ENTMCNC: 32.5 GM/DL
MCV RBC AUTO: 98.2 FL
MONOCYTES # BLD AUTO: 0.74 K/UL
MONOCYTES NFR BLD AUTO: 12.6 %
NEUTROPHILS # BLD AUTO: 3.43 K/UL
NEUTROPHILS NFR BLD AUTO: 58.4 %
NONHDLC SERPL-MCNC: 206 MG/DL
PLATELET # BLD AUTO: 191 K/UL
POTASSIUM SERPL-SCNC: 4.3 MMOL/L
PROT SERPL-MCNC: 6.7 G/DL
RBC # BLD: 5.11 M/UL
RBC # FLD: 14.5 %
SODIUM SERPL-SCNC: 145 MMOL/L
TRIGL SERPL-MCNC: 150 MG/DL
TSH SERPL-ACNC: 0.9 UIU/ML
WBC # FLD AUTO: 5.87 K/UL

## 2021-09-02 LAB
ESTIMATED AVERAGE GLUCOSE: 128 MG/DL
HBA1C MFR BLD HPLC: 6.1 %
PSA SERPL-MCNC: 2.75 NG/ML

## 2021-10-12 ENCOUNTER — RESULT CHARGE (OUTPATIENT)
Age: 76
End: 2021-10-12

## 2021-10-12 ENCOUNTER — APPOINTMENT (OUTPATIENT)
Dept: FAMILY MEDICINE | Facility: CLINIC | Age: 76
End: 2021-10-12
Payer: MEDICARE

## 2021-10-12 DIAGNOSIS — R10.9 UNSPECIFIED ABDOMINAL PAIN: ICD-10-CM

## 2021-10-12 LAB
BILIRUB UR QL STRIP: NEGATIVE
GLUCOSE UR-MCNC: NEGATIVE
HCG UR QL: 0.2 EU/DL
HGB UR QL STRIP.AUTO: NEGATIVE
KETONES UR-MCNC: NEGATIVE
LEUKOCYTE ESTERASE UR QL STRIP: NEGATIVE
NITRITE UR QL STRIP: NEGATIVE
PH UR STRIP: 6
PROT UR STRIP-MCNC: >=300
SP GR UR STRIP: 1.02

## 2021-10-12 PROCEDURE — 81003 URINALYSIS AUTO W/O SCOPE: CPT | Mod: QW

## 2021-10-12 PROCEDURE — 99212 OFFICE O/P EST SF 10 MIN: CPT | Mod: 25

## 2021-10-13 LAB — BACTERIA UR CULT: NORMAL

## 2021-10-19 ENCOUNTER — APPOINTMENT (OUTPATIENT)
Dept: DISASTER EMERGENCY | Facility: CLINIC | Age: 76
End: 2021-10-19

## 2021-10-20 LAB — SARS-COV-2 N GENE NPH QL NAA+PROBE: NOT DETECTED

## 2021-11-09 ENCOUNTER — APPOINTMENT (OUTPATIENT)
Dept: FAMILY MEDICINE | Facility: CLINIC | Age: 76
End: 2021-11-09

## 2021-11-12 ENCOUNTER — OUTPATIENT (OUTPATIENT)
Dept: OUTPATIENT SERVICES | Facility: HOSPITAL | Age: 76
LOS: 1 days | Discharge: HOME | End: 2021-11-12
Payer: MEDICARE

## 2021-11-12 VITALS
HEART RATE: 75 BPM | SYSTOLIC BLOOD PRESSURE: 144 MMHG | OXYGEN SATURATION: 95 % | RESPIRATION RATE: 16 BRPM | WEIGHT: 163.14 LBS | DIASTOLIC BLOOD PRESSURE: 80 MMHG | HEIGHT: 65 IN | TEMPERATURE: 98 F

## 2021-11-12 DIAGNOSIS — Z96.651 PRESENCE OF RIGHT ARTIFICIAL KNEE JOINT: Chronic | ICD-10-CM

## 2021-11-12 DIAGNOSIS — Z01.818 ENCOUNTER FOR OTHER PREPROCEDURAL EXAMINATION: ICD-10-CM

## 2021-11-12 DIAGNOSIS — M17.11 UNILATERAL PRIMARY OSTEOARTHRITIS, RIGHT KNEE: ICD-10-CM

## 2021-11-12 DIAGNOSIS — Z98.890 OTHER SPECIFIED POSTPROCEDURAL STATES: Chronic | ICD-10-CM

## 2021-11-12 DIAGNOSIS — Z98.49 CATARACT EXTRACTION STATUS, UNSPECIFIED EYE: Chronic | ICD-10-CM

## 2021-11-12 DIAGNOSIS — Z01.812 ENCOUNTER FOR PREPROCEDURAL LABORATORY EXAMINATION: ICD-10-CM

## 2021-11-12 DIAGNOSIS — Z98.1 ARTHRODESIS STATUS: Chronic | ICD-10-CM

## 2021-11-12 DIAGNOSIS — M17.12 UNILATERAL PRIMARY OSTEOARTHRITIS, LEFT KNEE: ICD-10-CM

## 2021-11-12 LAB
A1C WITH ESTIMATED AVERAGE GLUCOSE RESULT: 5.9 % — HIGH (ref 4–5.6)
ANION GAP SERPL CALC-SCNC: 6 MMOL/L — SIGNIFICANT CHANGE UP (ref 5–17)
APTT BLD: 28.8 SEC — SIGNIFICANT CHANGE UP (ref 27.5–35.5)
BASOPHILS # BLD AUTO: 0.08 K/UL — SIGNIFICANT CHANGE UP (ref 0–0.2)
BASOPHILS NFR BLD AUTO: 0.9 % — SIGNIFICANT CHANGE UP (ref 0–2)
BLD GP AB SCN SERPL QL: SIGNIFICANT CHANGE UP
BUN SERPL-MCNC: 18 MG/DL — SIGNIFICANT CHANGE UP (ref 7–23)
CALCIUM SERPL-MCNC: 8.8 MG/DL — SIGNIFICANT CHANGE UP (ref 8.5–10.1)
CHLORIDE SERPL-SCNC: 110 MMOL/L — HIGH (ref 96–108)
CO2 SERPL-SCNC: 27 MMOL/L — SIGNIFICANT CHANGE UP (ref 22–31)
CREAT SERPL-MCNC: 1.14 MG/DL — SIGNIFICANT CHANGE UP (ref 0.5–1.3)
EOSINOPHIL # BLD AUTO: 0.3 K/UL — SIGNIFICANT CHANGE UP (ref 0–0.5)
EOSINOPHIL NFR BLD AUTO: 3.3 % — SIGNIFICANT CHANGE UP (ref 0–6)
ESTIMATED AVERAGE GLUCOSE: 123 MG/DL — HIGH (ref 68–114)
GLUCOSE SERPL-MCNC: 106 MG/DL — HIGH (ref 70–99)
HCT VFR BLD CALC: 46.2 % — SIGNIFICANT CHANGE UP (ref 39–50)
HGB BLD-MCNC: 15.5 G/DL — SIGNIFICANT CHANGE UP (ref 13–17)
IMM GRANULOCYTES NFR BLD AUTO: 0.4 % — SIGNIFICANT CHANGE UP (ref 0–1.5)
INR BLD: 0.95 RATIO — SIGNIFICANT CHANGE UP (ref 0.88–1.16)
LYMPHOCYTES # BLD AUTO: 1.33 K/UL — SIGNIFICANT CHANGE UP (ref 1–3.3)
LYMPHOCYTES # BLD AUTO: 14.7 % — SIGNIFICANT CHANGE UP (ref 13–44)
MCHC RBC-ENTMCNC: 31.3 PG — SIGNIFICANT CHANGE UP (ref 27–34)
MCHC RBC-ENTMCNC: 33.5 GM/DL — SIGNIFICANT CHANGE UP (ref 32–36)
MCV RBC AUTO: 93.3 FL — SIGNIFICANT CHANGE UP (ref 80–100)
MONOCYTES # BLD AUTO: 1.07 K/UL — HIGH (ref 0–0.9)
MONOCYTES NFR BLD AUTO: 11.8 % — SIGNIFICANT CHANGE UP (ref 2–14)
MRSA PCR RESULT.: SIGNIFICANT CHANGE UP
NEUTROPHILS # BLD AUTO: 6.24 K/UL — SIGNIFICANT CHANGE UP (ref 1.8–7.4)
NEUTROPHILS NFR BLD AUTO: 68.9 % — SIGNIFICANT CHANGE UP (ref 43–77)
NRBC # BLD: 0 /100 WBCS — SIGNIFICANT CHANGE UP (ref 0–0)
PLATELET # BLD AUTO: 212 K/UL — SIGNIFICANT CHANGE UP (ref 150–400)
POTASSIUM SERPL-MCNC: 3.6 MMOL/L — SIGNIFICANT CHANGE UP (ref 3.5–5.3)
POTASSIUM SERPL-SCNC: 3.6 MMOL/L — SIGNIFICANT CHANGE UP (ref 3.5–5.3)
PROTHROM AB SERPL-ACNC: 11.1 SEC — SIGNIFICANT CHANGE UP (ref 10.6–13.6)
RBC # BLD: 4.95 M/UL — SIGNIFICANT CHANGE UP (ref 4.2–5.8)
RBC # FLD: 14.1 % — SIGNIFICANT CHANGE UP (ref 10.3–14.5)
S AUREUS DNA NOSE QL NAA+PROBE: SIGNIFICANT CHANGE UP
SODIUM SERPL-SCNC: 143 MMOL/L — SIGNIFICANT CHANGE UP (ref 135–145)
WBC # BLD: 9.06 K/UL — SIGNIFICANT CHANGE UP (ref 3.8–10.5)
WBC # FLD AUTO: 9.06 K/UL — SIGNIFICANT CHANGE UP (ref 3.8–10.5)

## 2021-11-12 PROCEDURE — 93010 ELECTROCARDIOGRAM REPORT: CPT

## 2021-11-12 NOTE — H&P PST ADULT - GENITOURINARY COMMENTS
was on BPH meds d/c'd use states tried for 2 months and was not working. Plans to d/w Dr. Fink on 11/16/2021

## 2021-11-12 NOTE — H&P PST ADULT - NSICDXPASTSURGICALHX_GEN_ALL_CORE_FT
PAST SURGICAL HISTORY:  H/O colonoscopy 2015    H/O spinal fusion 2000  rods cervical and lumbar spine    H/O total knee replacement, right 2021    History of cataract surgery (Bilateral eyes)    History of tonsillectomy (Age 12)    S/P carotid endarterectomy (Right, 2/2018)

## 2021-11-12 NOTE — PHYSICAL THERAPY INITIAL EVALUATION ADULT - ADDITIONAL COMMENTS
Patient endorses typical pain at best is 1/10, at worst is 10/10. Per patient, pain is worse with stairs. Pain is relieved by taking Tylenol.  Home set-up: lives with spouse in private house with 5 steps with b/l wide handrails to enter at front. Bedroom / bathroom is on main level. Bathroom is a step-in shower combination with grab bars by toilet, a standard height toilet seat, with retractable shower head. Endorses will have limited supported by spouse post-op. Patient is currently using standard cane with mobility. Patient  is R-handed and drives; wears glasses for distance. Patient denies falls in past 6 months. Reports occasional buckling.

## 2021-11-12 NOTE — PHYSICAL THERAPY INITIAL EVALUATION ADULT - SINGLE LEG BALANCE TEST, REHAB EVAL
One Leg Stand Test (OLST): Right: 1 sec Left: 2 sec.  Functional test to assess fall risk. Both gait and stair negotiation require components of OLST. Participants unable to perform the OLST for at least 5 seconds are at increased risk for injurious fall.

## 2021-11-12 NOTE — OCCUPATIONAL THERAPY INITIAL EVALUATION ADULT - GENERAL OBSERVATIONS, REHAB EVAL
Chart reviewed. Patient encountered seated in recliner chair in waiting area with NAD. Patient underwent occupational therapy pre-operative consultation to determine current functional limitations in order to recommend appropriate DME & to educate patient on post operative OT services.

## 2021-11-12 NOTE — H&P PST ADULT - ASSESSMENT
76 year old male with a past medical history of HTN, GERD, and COPD underwent a right total knee replacement on 2021, he c/o pain, swelling, and limited ROM to right knee.  He is scheduled for a right total knee revision on 2021.    CAPRINI SCORE [CLOT]    AGE RELATED RISK FACTORS                                                       MOBILITY RELATED FACTORS  [ ] Age 41-60 years                                            (1 Point)                  [ ] Bed rest                                                        (1 Point)  [ ] Age: 61-74 years                                           (2 Points)                 [ ] Plaster cast                                                   (2 Points)  [x ] Age= 75 years                                              (3 Points)                 [ ] Bed bound for more than 72 hours                 (2 Points)    DISEASE RELATED RISK FACTORS                                               GENDER SPECIFIC FACTORS  [x ] Edema in the lower extremities                       (1 Point)                  [ ] Pregnancy                                                     (1 Point)  [ ] Varicose veins                                               (1 Point)                  [ ] Post-partum < 6 weeks                                   (1 Point)             [x ] BMI > 25 Kg/m2                                            (1 Point)                  [ ] Hormonal therapy  or oral contraception          (1 Point)                 [ ] Sepsis (in the previous month)                        (1 Point)                  [ ] History of pregnancy complications                 (1 point)  [ ] Pneumonia or serious lung disease                                               [ ] Unexplained or recurrent                     (1 Point)           (in the previous month)                               (1 Point)  [ ] Abnormal pulmonary function test                     (1 Point)                 SURGERY RELATED RISK FACTORS  [ ] Acute myocardial infarction                              (1 Point)                 [ ]  Section                                             (1 Point)  [ ] Congestive heart failure (in the previous month)  (1 Point)               [ ] Minor surgery                                                  (1 Point)   [ ] Inflammatory bowel disease                             (1 Point)                 [ ] Arthroscopic surgery                                        (2 Points)  [ ] Central venous access                                      (2 Points)                [ ] General surgery lasting more than 45 minutes   (2 Points)       [ ] Stroke (in the previous month)                          (5 Points)               [x ] Elective arthroplasty                                         (5 Points)                                                                                                                                               HEMATOLOGY RELATED FACTORS                                                 TRAUMA RELATED RISK FACTORS  [ ] Prior episodes of VTE                                     (3 Points)                [ ] Fracture of the hip, pelvis, or leg                       (5 Points)  [ ] Positive family history for VTE                         (3 Points)                 [ ] Acute spinal cord injury (in the previous month)  (5 Points)  [ ] Prothrombin 16215 A                                     (3 Points)                 [ ] Paralysis  (less than 1 month)                             (5 Points)  [ ] Factor V Leiden                                             (3 Points)                  [ ] Multiple Trauma within 1 month                        (5 Points)  [ ] Lupus anticoagulants                                     (3 Points)                                                           [ ] Anticardiolipin antibodies                               (3 Points)                                                       [ ] High homocysteine in the blood                      (3 Points)                                             [ ] Other congenital or acquired thrombophilia      (3 Points)                                                [ ] Heparin induced thrombocytopenia                  (3 Points)                                          Total Score [     10     ]    Caprini Score 0 - 2:  Low Risk, No VTE Prophylaxis required for most patients, encourage ambulation  Caprini Score 3 - 6:  At Risk, pharmacologic VTE prophylaxis is indicated for most patients (in the absence of a contraindication)  Caprini Score Greater than or = 7:  High Risk, pharmacologic VTE prophylaxis is indicated for most patients (in the absence of a contraindication)    Caprini score indicates that the patient is high risk for VTE event ( score 6 or greater). Surgical patient's in this group will benefit from both pharmacologic prophylaxis and intermittent compression devices . Surgical team will determine the balance between VTE  risk and bleeding risk and other clinical considerations

## 2021-11-12 NOTE — OCCUPATIONAL THERAPY INITIAL EVALUATION ADULT - ADDITIONAL COMMENTS
Pt reports he lives with spouse (who can provide limited support) in private house with 5 steps to enter with wide b/l railings. Pt resides on main level with access to walk-in shower with retractable shower head and standard toilet seat. Pt states that a 3:1 commode can sit over his toilet at home. Pt is independent with ADLs and mobility with use of cane. Pain is exacerbated by navigating stairs, pt has no recent falls and experiences knee buckling. Pt wears glasses, is right handed, drives, uses hearing aids, and has recent right partial knee replacement (1/7/21).

## 2021-11-12 NOTE — PHYSICAL THERAPY INITIAL EVALUATION ADULT - 30 SECOND CHAIR STAND TEST, REHAB EVAL
30 second Sit to Stand Test: (reps: 5 adaptation: arm rests) Functional assessment of lower extremity strength and ability to maintain balance in standing, discriminates those with low and high levels of functional activity.
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
## 2021-11-12 NOTE — H&P PST ADULT - PROBLEM SELECTOR PLAN 1
labs - cbc,pt/ptt,bmp,t&s,nose cx,ekg  M/C and pulmonary required  preop 3 day Hibiclens instruction reviewed and given .instructed on if  nose cx positive use Mupirocin 5 days and checklist given  take routine meds DOS with sips of water. avoid NSAID and OTC supplements. verbalized understanding  information on proper nutrition , increase protein and better food choices provided in packet

## 2021-11-12 NOTE — H&P PST ADULT - HISTORY OF PRESENT ILLNESS
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  He is scheduled for a right total knee revision on 12/1/2021.    He denies fever, SOB, recent travels, and sick contacts. + cough secondary to allergies    Goal: to walk without pain.  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)  and COPD underwent a right total knee replacement on 1/7/2021, he c/o pain, swelling, and limited ROM to right knee.  He is scheduled for a right total knee revision on 12/1/2021.    He denies fever, SOB, recent travels, and sick contacts. + cough secondary to allergies    Goal: to walk without pain.

## 2021-11-12 NOTE — H&P PST ADULT - NSICDXPASTMEDICALHX_GEN_ALL_CORE_FT
PAST MEDICAL HISTORY:  COPD (chronic obstructive pulmonary disease) Bronchiectasis, recent PFT test was normal (as per patient ), followed by Dr. Cheng    Deviated septum septum perforation , , inconsequential ,    GERD (gastroesophageal reflux disease) Moreno's esophagus    Hypercholesteremia     Hypertension     Other and unspecified ventral hernia with obstruction, without gangrene     Sleep apnea information from Dr Pierre PCP 12/26/18- does not use CPAP

## 2021-11-12 NOTE — PHYSICAL THERAPY INITIAL EVALUATION ADULT - ACTIVE RANGE OF MOTION EXAMINATION, REHAB EVAL
except b/l LE knee extension lacking 5-10 degrees/bilateral upper extremity Active ROM was WFL (within functional limits)/bilateral  lower extremity Active ROM was WFL (within functional limits)

## 2021-11-12 NOTE — OCCUPATIONAL THERAPY INITIAL EVALUATION ADULT - PERTINENT HX OF CURRENT PROBLEM, REHAB EVAL
Pain and OA to right knee. Pt is scheduled for right TKA with Dr. Daugherty at [facility] on 12/1/21.

## 2021-11-16 ENCOUNTER — APPOINTMENT (OUTPATIENT)
Dept: FAMILY MEDICINE | Facility: CLINIC | Age: 76
End: 2021-11-16
Payer: MEDICARE

## 2021-11-16 PROCEDURE — 99214 OFFICE O/P EST MOD 30 MIN: CPT

## 2021-11-22 PROBLEM — G47.30 SLEEP APNEA, UNSPECIFIED: Chronic | Status: ACTIVE | Noted: 2018-12-31

## 2021-11-22 PROBLEM — J34.2 DEVIATED NASAL SEPTUM: Chronic | Status: ACTIVE | Noted: 2018-02-15

## 2021-11-28 ENCOUNTER — APPOINTMENT (OUTPATIENT)
Dept: DISASTER EMERGENCY | Facility: CLINIC | Age: 76
End: 2021-11-28

## 2021-11-29 LAB — SARS-COV-2 N GENE NPH QL NAA+PROBE: NOT DETECTED

## 2021-11-30 ENCOUNTER — TRANSCRIPTION ENCOUNTER (OUTPATIENT)
Age: 76
End: 2021-11-30

## 2021-12-01 ENCOUNTER — OUTPATIENT (OUTPATIENT)
Dept: OUTPATIENT SERVICES | Facility: HOSPITAL | Age: 76
LOS: 1 days | Discharge: HOME HEALTH | End: 2021-12-01

## 2021-12-01 ENCOUNTER — RESULT REVIEW (OUTPATIENT)
Age: 76
End: 2021-12-01

## 2021-12-01 ENCOUNTER — TRANSCRIPTION ENCOUNTER (OUTPATIENT)
Age: 76
End: 2021-12-01

## 2021-12-01 DIAGNOSIS — Z98.890 OTHER SPECIFIED POSTPROCEDURAL STATES: Chronic | ICD-10-CM

## 2021-12-01 DIAGNOSIS — Z98.1 ARTHRODESIS STATUS: Chronic | ICD-10-CM

## 2021-12-01 DIAGNOSIS — I10 ESSENTIAL (PRIMARY) HYPERTENSION: ICD-10-CM

## 2021-12-01 DIAGNOSIS — G47.33 OBSTRUCTIVE SLEEP APNEA (ADULT) (PEDIATRIC): ICD-10-CM

## 2021-12-01 DIAGNOSIS — T84.092A OTHER MECHANICAL COMPLICATION OF INTERNAL RIGHT KNEE PROSTHESIS, INITIAL ENCOUNTER: ICD-10-CM

## 2021-12-01 DIAGNOSIS — K21.9 GASTRO-ESOPHAGEAL REFLUX DISEASE WITHOUT ESOPHAGITIS: ICD-10-CM

## 2021-12-01 DIAGNOSIS — Z98.1 ARTHRODESIS STATUS: ICD-10-CM

## 2021-12-01 DIAGNOSIS — Z98.49 CATARACT EXTRACTION STATUS, UNSPECIFIED EYE: Chronic | ICD-10-CM

## 2021-12-01 DIAGNOSIS — Z88.0 ALLERGY STATUS TO PENICILLIN: ICD-10-CM

## 2021-12-01 DIAGNOSIS — E78.00 PURE HYPERCHOLESTEROLEMIA, UNSPECIFIED: ICD-10-CM

## 2021-12-01 DIAGNOSIS — J44.9 CHRONIC OBSTRUCTIVE PULMONARY DISEASE, UNSPECIFIED: ICD-10-CM
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
## 2021-12-01 DIAGNOSIS — Z96.651 PRESENCE OF RIGHT ARTIFICIAL KNEE JOINT: Chronic | ICD-10-CM

## 2021-12-02 ENCOUNTER — TRANSCRIPTION ENCOUNTER (OUTPATIENT)
Age: 76
End: 2021-12-02

## 2021-12-03 ENCOUNTER — NON-APPOINTMENT (OUTPATIENT)
Age: 76
End: 2021-12-03

## 2021-12-06 ENCOUNTER — APPOINTMENT (OUTPATIENT)
Dept: FAMILY MEDICINE | Facility: CLINIC | Age: 76
End: 2021-12-06
Payer: MEDICARE

## 2021-12-06 VITALS
BODY MASS INDEX: 26.82 KG/M2 | HEART RATE: 84 BPM | OXYGEN SATURATION: 97 % | WEIGHT: 161 LBS | TEMPERATURE: 97.9 F | DIASTOLIC BLOOD PRESSURE: 76 MMHG | HEIGHT: 65 IN | SYSTOLIC BLOOD PRESSURE: 128 MMHG

## 2021-12-06 DIAGNOSIS — Z01.818 ENCOUNTER FOR OTHER PREPROCEDURAL EXAMINATION: ICD-10-CM

## 2021-12-06 DIAGNOSIS — M79.10 MYALGIA, UNSPECIFIED SITE: ICD-10-CM

## 2021-12-06 PROCEDURE — 99495 TRANSJ CARE MGMT MOD F2F 14D: CPT

## 2021-12-06 RX ORDER — ATORVASTATIN CALCIUM 40 MG/1
40 TABLET, FILM COATED ORAL DAILY
Qty: 90 | Refills: 0 | Status: DISCONTINUED | COMMUNITY
Start: 2020-09-04 | End: 2021-12-06

## 2021-12-06 NOTE — HISTORY OF PRESENT ILLNESS
[Post-hospitalization from ___ Hospital] : Post-hospitalization from [unfilled] Hospital [Admitted on: ___] : The patient was admitted on [unfilled] [Discharged on ___] : discharged on [unfilled] [Pertinent Labs] : pertinent labs [FreeTextEntry2] : c/o kidney issues in hospital, here for rechk\par also c/o thigh pain since starting atorvastatin, did not subside w/ coq10 so stopped; would like lipids rechecked\par RN goes to home to change knee dressings, still bleeding\par doing PT for knee, c/o some pain

## 2021-12-06 NOTE — PHYSICAL EXAM
[No Acute Distress] : no acute distress [Well Nourished] : well nourished [Normal Sclera/Conjunctiva] : normal sclera/conjunctiva [EOMI] : extraocular movements intact [Normal Outer Ear/Nose] : the outer ears and nose were normal in appearance [No JVD] : no jugular venous distention [Normal] : normal rate, regular rhythm, normal S1 and S2 and no murmur heard [Coordination Grossly Intact] : coordination grossly intact [Normal Affect] : the affect was normal [Alert and Oriented x3] : oriented to person, place, and time [Normal Insight/Judgement] : insight and judgment were intact [47784 - Moderate Complexity requires multiple possible diagnoses and/or the management options, moderate complexity of the medical data (tests, etc.) to be reviewed, and moderate risk of significant complications, morbidity, and/or mortality as well as co] : Moderate Complexity

## 2021-12-08 ENCOUNTER — RX RENEWAL (OUTPATIENT)
Age: 76
End: 2021-12-08

## 2021-12-09 ENCOUNTER — APPOINTMENT (OUTPATIENT)
Dept: FAMILY MEDICINE | Facility: CLINIC | Age: 76
End: 2021-12-09
Payer: MEDICARE

## 2021-12-09 DIAGNOSIS — R73.09 OTHER ABNORMAL GLUCOSE: ICD-10-CM

## 2021-12-09 DIAGNOSIS — R73.03 PREDIABETES.: ICD-10-CM

## 2021-12-09 DIAGNOSIS — R79.89 OTHER SPECIFIED ABNORMAL FINDINGS OF BLOOD CHEMISTRY: ICD-10-CM

## 2021-12-09 LAB
ALBUMIN SERPL ELPH-MCNC: 4 G/DL
ALP BLD-CCNC: 106 U/L
ALT SERPL-CCNC: <5 U/L
ANION GAP SERPL CALC-SCNC: 14 MMOL/L
AST SERPL-CCNC: 18 U/L
BASOPHILS # BLD AUTO: 0.05 K/UL
BASOPHILS NFR BLD AUTO: 0.5 %
BILIRUB SERPL-MCNC: 0.4 MG/DL
BUN SERPL-MCNC: 24 MG/DL
CALCIUM SERPL-MCNC: 9.1 MG/DL
CHLORIDE SERPL-SCNC: 104 MMOL/L
CHOLEST SERPL-MCNC: 220 MG/DL
CK SERPL-CCNC: 36 U/L
CO2 SERPL-SCNC: 24 MMOL/L
CREAT SERPL-MCNC: 1.39 MG/DL
EOSINOPHIL # BLD AUTO: 0.33 K/UL
EOSINOPHIL NFR BLD AUTO: 3.4 %
ESTIMATED AVERAGE GLUCOSE: 128 MG/DL
GLUCOSE SERPL-MCNC: 122 MG/DL
HBA1C MFR BLD HPLC: 6.1 %
HCT VFR BLD CALC: 45.6 %
HDLC SERPL-MCNC: 47 MG/DL
HGB BLD-MCNC: 14.4 G/DL
IMM GRANULOCYTES NFR BLD AUTO: 0.5 %
LDLC SERPL CALC-MCNC: 143 MG/DL
LDLC SERPL DIRECT ASSAY-MCNC: 133 MG/DL
LYMPHOCYTES # BLD AUTO: 1.52 K/UL
LYMPHOCYTES NFR BLD AUTO: 15.8 %
MAN DIFF?: NORMAL
MCHC RBC-ENTMCNC: 31.6 GM/DL
MCHC RBC-ENTMCNC: 31.8 PG
MCV RBC AUTO: 100.7 FL
MONOCYTES # BLD AUTO: 1.18 K/UL
MONOCYTES NFR BLD AUTO: 12.3 %
NEUTROPHILS # BLD AUTO: 6.49 K/UL
NEUTROPHILS NFR BLD AUTO: 67.5 %
NONHDLC SERPL-MCNC: 173 MG/DL
PLATELET # BLD AUTO: 263 K/UL
POTASSIUM SERPL-SCNC: 4.5 MMOL/L
PROT SERPL-MCNC: 6.5 G/DL
RBC # BLD: 4.53 M/UL
RBC # FLD: 14.8 %
SODIUM SERPL-SCNC: 142 MMOL/L
TRIGL SERPL-MCNC: 152 MG/DL
WBC # FLD AUTO: 9.62 K/UL

## 2021-12-09 PROCEDURE — 99442: CPT | Mod: 95

## 2021-12-09 NOTE — PHYSICAL EXAM
[No Acute Distress] : no acute distress [No Respiratory Distress] : no respiratory distress  [Normal Affect] : the affect was normal [Alert and Oriented x3] : oriented to person, place, and time [Normal Insight/Judgement] : insight and judgment were intact

## 2021-12-09 NOTE — HISTORY OF PRESENT ILLNESS
[Home] : at home, [unfilled] , at the time of the visit. [Medical Office: ([facility])___] : at the medical office located in  [Verbal consent obtained from patient] : the patient, [unfilled] [FreeTextEntry8] : c/o penile bleeding, a few drop noted when he starts to urinate, but then all urine thereafter, denies pain\par home care nurse to come in later this afternoon\par to review bw

## 2021-12-23 ENCOUNTER — OUTPATIENT (OUTPATIENT)
Dept: OUTPATIENT SERVICES | Facility: HOSPITAL | Age: 76
LOS: 1 days | Discharge: HOME | End: 2021-12-23

## 2021-12-23 VITALS
OXYGEN SATURATION: 100 % | SYSTOLIC BLOOD PRESSURE: 147 MMHG | WEIGHT: 159.39 LBS | DIASTOLIC BLOOD PRESSURE: 77 MMHG | TEMPERATURE: 98 F | HEART RATE: 79 BPM | HEIGHT: 66 IN

## 2021-12-23 DIAGNOSIS — Z98.890 OTHER SPECIFIED POSTPROCEDURAL STATES: Chronic | ICD-10-CM

## 2021-12-23 DIAGNOSIS — Z01.818 ENCOUNTER FOR OTHER PREPROCEDURAL EXAMINATION: ICD-10-CM

## 2021-12-23 DIAGNOSIS — G47.30 SLEEP APNEA, UNSPECIFIED: ICD-10-CM

## 2021-12-23 DIAGNOSIS — E78.5 HYPERLIPIDEMIA, UNSPECIFIED: ICD-10-CM

## 2021-12-23 DIAGNOSIS — Z96.651 PRESENCE OF RIGHT ARTIFICIAL KNEE JOINT: Chronic | ICD-10-CM

## 2021-12-23 DIAGNOSIS — Z98.1 ARTHRODESIS STATUS: Chronic | ICD-10-CM

## 2021-12-23 DIAGNOSIS — M17.12 UNILATERAL PRIMARY OSTEOARTHRITIS, LEFT KNEE: ICD-10-CM

## 2021-12-23 DIAGNOSIS — Z98.49 CATARACT EXTRACTION STATUS, UNSPECIFIED EYE: Chronic | ICD-10-CM

## 2021-12-23 DIAGNOSIS — Z87.09 PERSONAL HISTORY OF OTHER DISEASES OF THE RESPIRATORY SYSTEM: ICD-10-CM

## 2021-12-23 LAB
ANION GAP SERPL CALC-SCNC: 8 MMOL/L — SIGNIFICANT CHANGE UP (ref 5–17)
APTT BLD: 28.5 SEC — SIGNIFICANT CHANGE UP (ref 27.5–35.5)
BLD GP AB SCN SERPL QL: SIGNIFICANT CHANGE UP
BUN SERPL-MCNC: 18 MG/DL — SIGNIFICANT CHANGE UP (ref 7–23)
CALCIUM SERPL-MCNC: 9 MG/DL — SIGNIFICANT CHANGE UP (ref 8.5–10.1)
CHLORIDE SERPL-SCNC: 104 MMOL/L — SIGNIFICANT CHANGE UP (ref 96–108)
CO2 SERPL-SCNC: 28 MMOL/L — SIGNIFICANT CHANGE UP (ref 22–31)
CREAT SERPL-MCNC: 1.25 MG/DL — SIGNIFICANT CHANGE UP (ref 0.5–1.3)
GLUCOSE SERPL-MCNC: 91 MG/DL — SIGNIFICANT CHANGE UP (ref 70–99)
HCT VFR BLD CALC: 46.7 % — SIGNIFICANT CHANGE UP (ref 39–50)
HGB BLD-MCNC: 15.3 G/DL — SIGNIFICANT CHANGE UP (ref 13–17)
INR BLD: 1 RATIO — SIGNIFICANT CHANGE UP (ref 0.88–1.16)
MCHC RBC-ENTMCNC: 31.2 PG — SIGNIFICANT CHANGE UP (ref 27–34)
MCHC RBC-ENTMCNC: 32.8 GM/DL — SIGNIFICANT CHANGE UP (ref 32–36)
MCV RBC AUTO: 95.1 FL — SIGNIFICANT CHANGE UP (ref 80–100)
NRBC # BLD: 0 /100 WBCS — SIGNIFICANT CHANGE UP (ref 0–0)
PLATELET # BLD AUTO: 271 K/UL — SIGNIFICANT CHANGE UP (ref 150–400)
POTASSIUM SERPL-MCNC: 4 MMOL/L — SIGNIFICANT CHANGE UP (ref 3.5–5.3)
POTASSIUM SERPL-SCNC: 4 MMOL/L — SIGNIFICANT CHANGE UP (ref 3.5–5.3)
PROTHROM AB SERPL-ACNC: 11.6 SEC — SIGNIFICANT CHANGE UP (ref 10.6–13.6)
RBC # BLD: 4.91 M/UL — SIGNIFICANT CHANGE UP (ref 4.2–5.8)
RBC # FLD: 14.3 % — SIGNIFICANT CHANGE UP (ref 10.3–14.5)
SODIUM SERPL-SCNC: 140 MMOL/L — SIGNIFICANT CHANGE UP (ref 135–145)
WBC # BLD: 5.05 K/UL — SIGNIFICANT CHANGE UP (ref 3.8–10.5)
WBC # FLD AUTO: 5.05 K/UL — SIGNIFICANT CHANGE UP (ref 3.8–10.5)

## 2021-12-23 NOTE — H&P PST ADULT - HISTORY OF PRESENT ILLNESS
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)  and COPD   ...........    He denies fever, SOB, recent travels, and sick contacts.   Goal: to walk without pain.  ambulatory 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)  and COPD c/o left knee pain 2/2 unilateral primary osteoarthritis here for PST for scheduled left total knee replacement    He denies fever, SOB, recent travels, and sick contacts.   Goal: to walk without pain.

## 2021-12-23 NOTE — H&P PST ADULT - NS ABD PE RECTAL EXAM
Telephone Encounter by Gabrielle Ryan at 07/03/17 04:01 PM     Author:  Gabrielle Ryan Service:  (none) Author Type:       Filed:  07/03/17 04:02 PM Encounter Date:  6/29/2017 Status:  Signed     :  Gabrielle Ryan ()            Pt returned call writer scheduled in computer Vein mapping 7.17.17 @ 7;30am ok to double book per messg below tld pt [place] & prep[SG1.1M]      Revision History        User Key Date/Time User Provider Type Action    > SG1.1 07/03/17 04:02 PM Gabrielle Ryan  Sign    M - Manual             not examined

## 2021-12-23 NOTE — PHYSICAL THERAPY INITIAL EVALUATION ADULT - PERTINENT HX OF CURRENT PROBLEM, REHAB EVAL
Patient attends pre-op testing today following consult c Dr. Daugherty due to chronic pain to L knee. Elective L TKA is now scheduled in this facility for 1/12/22.

## 2021-12-23 NOTE — H&P PST ADULT - ASSESSMENT
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)  and COPD c/o left knee pain 2/2 unilateral primary osteoarthritis here for PST for scheduled left total knee replacement  CAPRINI SCORE    AGE RELATED RISK FACTORS                                                       MOBILITY RELATED FACTORS  [ ] Age 41-60 years                                            (1 Point)                  [ ] Bed rest                                                        (1 Point)  [ ] Age: 61-74 years                                           (2 Points)                [ ] Plaster cast                                                   (2 Points)  [ x] Age= 75 years                                              (3 Points)                 [ ] Bed bound for more than 72 hours                   (2 Points)    DISEASE RELATED RISK FACTORS                                               GENDER SPECIFIC FACTORS  [ x] Edema in the lower extremities                       (1 Point)                  [ ] Pregnancy                                                     (1 Point)  [ ] Varicose veins                                               (1 Point)                  [ ] Post-partum < 6 weeks                                   (1 Point)             [x ] BMI > 25 Kg/m2                                            (1 Point)                  [ ] Hormonal therapy  or oral contraception            (1 Point)                 [ ] Sepsis (in the previous month)                        (1 Point)                  [ ] History of pregnancy complications  [ ] Pneumonia or serious lung disease                                               [ ] Unexplained or recurrent                       (1 Point)           (in the previous month)                               (1 Point)  [ ] Abnormal pulmonary function test                     (1 Point)                 SURGERY RELATED RISK FACTORS  [ ] Acute myocardial infarction                              (1 Point)                 [ ]  Section                                            (1 Point)  [ ] Congestive heart failure (in the previous month)  (1 Point)                 [ ] Minor surgery                                                 (1 Point)   [ ] Inflammatory bowel disease                             (1 Point)                 [ ] Arthroscopic surgery                                        (2 Points)  [ ] Central venous access                                    (2 Points)                [ ] General surgery lasting more than 45 minutes   (2 Points)       [ ] Stroke (in the previous month)                          (5 Points)               [ x] Elective arthroplasty                                        (5 Points)                                                                                                                                               HEMATOLOGY RELATED FACTORS                                                 TRAUMA RELATED RISK FACTORS  [ ] Prior episodes of VTE                                     (3 Points)                 [ ] Fracture of the hip, pelvis, or leg                       (5 Points)  [ ] Positive family history for VTE                         (3 Points)                 [ ] Acute spinal cord injury (in the previous month)  (5 Points)  [ ] Prothrombin 37258 A                                      (3 Points)                 [ ] Paralysis  (less than 1 month)                          (5 Points)  [ ] Factor V Leiden                                             (3 Points)                 [ ] Multiple Trauma within 1 month                         (5 Points)  [ ] Lupus anticoagulants                                     (3 Points)                                                           [ ] Anticardiolipin antibodies                                (3 Points)                                                       [ ] High homocysteine in the blood                      (3 Points)                                             [ ] Other congenital or acquired thrombophilia       (3 Points)                                                [ ] Heparin induced thrombocytopenia                  (3 Points)                                          Total Score [      10   ]

## 2021-12-23 NOTE — PHYSICAL THERAPY INITIAL EVALUATION ADULT - 30 SECOND CHAIR STAND TEST, REHAB EVAL
Please follow up with your doctor    Return to the Emergency Department for any concerning or worsening symptoms. 5x Sit to Stand Test : 45.70  seconds c the assist of the armrests of the chair.

## 2021-12-23 NOTE — H&P PST ADULT - PROBLEM SELECTOR PLAN 1
Left total knee replacement  labs - cbc,pt/ptt,bmp,t&s,nose cx,ekg  M/C required  pulmonary clearance  preop 3 day hibiclens instruction reviewed and given .instructed on if  nose cx positive use mupuricin 5 days and checklist given  take routine meds DOS with sips of water. avoid NSAID and OTC supplements. verbalized understanding  information on proper nutrition , increase protein and better food choices provided in packet   ensure clear given

## 2021-12-24 LAB
A1C WITH ESTIMATED AVERAGE GLUCOSE RESULT: 5.8 % — HIGH (ref 4–5.6)
ESTIMATED AVERAGE GLUCOSE: 120 MG/DL — HIGH (ref 68–114)
MRSA PCR RESULT.: SIGNIFICANT CHANGE UP
S AUREUS DNA NOSE QL NAA+PROBE: SIGNIFICANT CHANGE UP

## 2021-12-30 ENCOUNTER — APPOINTMENT (OUTPATIENT)
Dept: FAMILY MEDICINE | Facility: CLINIC | Age: 76
End: 2021-12-30
Payer: MEDICARE

## 2021-12-30 VITALS
BODY MASS INDEX: 26.82 KG/M2 | OXYGEN SATURATION: 98 % | WEIGHT: 161 LBS | TEMPERATURE: 98.2 F | DIASTOLIC BLOOD PRESSURE: 79 MMHG | SYSTOLIC BLOOD PRESSURE: 124 MMHG | HEIGHT: 65 IN | HEART RATE: 82 BPM

## 2021-12-30 DIAGNOSIS — M19.90 UNSPECIFIED OSTEOARTHRITIS, UNSPECIFIED SITE: ICD-10-CM

## 2021-12-30 PROCEDURE — 99214 OFFICE O/P EST MOD 30 MIN: CPT

## 2022-01-28 ENCOUNTER — OUTPATIENT (OUTPATIENT)
Dept: OUTPATIENT SERVICES | Facility: HOSPITAL | Age: 77
LOS: 1 days | Discharge: HOME | End: 2022-01-28

## 2022-01-28 VITALS
WEIGHT: 160.5 LBS | RESPIRATION RATE: 18 BRPM | OXYGEN SATURATION: 98 % | TEMPERATURE: 98 F | DIASTOLIC BLOOD PRESSURE: 80 MMHG | HEART RATE: 67 BPM | SYSTOLIC BLOOD PRESSURE: 154 MMHG | HEIGHT: 66 IN

## 2022-01-28 DIAGNOSIS — Z96.651 PRESENCE OF RIGHT ARTIFICIAL KNEE JOINT: Chronic | ICD-10-CM

## 2022-01-28 DIAGNOSIS — Z01.812 ENCOUNTER FOR PREPROCEDURAL LABORATORY EXAMINATION: ICD-10-CM

## 2022-01-28 DIAGNOSIS — Z98.890 OTHER SPECIFIED POSTPROCEDURAL STATES: Chronic | ICD-10-CM

## 2022-01-28 DIAGNOSIS — Z98.1 ARTHRODESIS STATUS: Chronic | ICD-10-CM

## 2022-01-28 DIAGNOSIS — Z01.818 ENCOUNTER FOR OTHER PREPROCEDURAL EXAMINATION: ICD-10-CM

## 2022-01-28 DIAGNOSIS — Z98.49 CATARACT EXTRACTION STATUS, UNSPECIFIED EYE: Chronic | ICD-10-CM

## 2022-01-28 DIAGNOSIS — M17.12 UNILATERAL PRIMARY OSTEOARTHRITIS, LEFT KNEE: ICD-10-CM

## 2022-01-28 LAB
A1C WITH ESTIMATED AVERAGE GLUCOSE RESULT: 5.8 % — HIGH (ref 4–5.6)
ANION GAP SERPL CALC-SCNC: 6 MMOL/L — SIGNIFICANT CHANGE UP (ref 5–17)
APTT BLD: 27.3 SEC — LOW (ref 27.5–35.5)
BUN SERPL-MCNC: 20 MG/DL — SIGNIFICANT CHANGE UP (ref 7–23)
CALCIUM SERPL-MCNC: 9.5 MG/DL — SIGNIFICANT CHANGE UP (ref 8.5–10.1)
CHLORIDE SERPL-SCNC: 108 MMOL/L — SIGNIFICANT CHANGE UP (ref 96–108)
CO2 SERPL-SCNC: 27 MMOL/L — SIGNIFICANT CHANGE UP (ref 22–31)
CREAT SERPL-MCNC: 1.14 MG/DL — SIGNIFICANT CHANGE UP (ref 0.5–1.3)
ESTIMATED AVERAGE GLUCOSE: 120 MG/DL — HIGH (ref 68–114)
GLUCOSE SERPL-MCNC: 91 MG/DL — SIGNIFICANT CHANGE UP (ref 70–99)
HCT VFR BLD CALC: 46.9 % — SIGNIFICANT CHANGE UP (ref 39–50)
HGB BLD-MCNC: 15.5 G/DL — SIGNIFICANT CHANGE UP (ref 13–17)
INR BLD: 0.95 RATIO — SIGNIFICANT CHANGE UP (ref 0.88–1.16)
MCHC RBC-ENTMCNC: 30.6 PG — SIGNIFICANT CHANGE UP (ref 27–34)
MCHC RBC-ENTMCNC: 33 G/DL — SIGNIFICANT CHANGE UP (ref 32–36)
MCV RBC AUTO: 92.5 FL — SIGNIFICANT CHANGE UP (ref 80–100)
NRBC # BLD: 0 /100 WBCS — SIGNIFICANT CHANGE UP (ref 0–0)
PLATELET # BLD AUTO: 232 K/UL — SIGNIFICANT CHANGE UP (ref 150–400)
POTASSIUM SERPL-MCNC: 3.5 MMOL/L — SIGNIFICANT CHANGE UP (ref 3.5–5.3)
POTASSIUM SERPL-SCNC: 3.5 MMOL/L — SIGNIFICANT CHANGE UP (ref 3.5–5.3)
PROTHROM AB SERPL-ACNC: 11.1 SEC — SIGNIFICANT CHANGE UP (ref 10.6–13.6)
RBC # BLD: 5.07 M/UL — SIGNIFICANT CHANGE UP (ref 4.2–5.8)
RBC # FLD: 14.6 % — HIGH (ref 10.3–14.5)
SODIUM SERPL-SCNC: 141 MMOL/L — SIGNIFICANT CHANGE UP (ref 135–145)
WBC # BLD: 8.15 K/UL — SIGNIFICANT CHANGE UP (ref 3.8–10.5)
WBC # FLD AUTO: 8.15 K/UL — SIGNIFICANT CHANGE UP (ref 3.8–10.5)

## 2022-01-28 RX ORDER — SODIUM CHLORIDE 9 MG/ML
3 INJECTION INTRAMUSCULAR; INTRAVENOUS; SUBCUTANEOUS EVERY 8 HOURS
Refills: 0 | Status: DISCONTINUED | OUTPATIENT
Start: 2022-02-23 | End: 2022-02-23

## 2022-01-28 NOTE — OCCUPATIONAL THERAPY INITIAL EVALUATION ADULT - PERTINENT HX OF CURRENT PROBLEM, REHAB EVAL
L knee OA which impacts pts ability to perform functional tasks/transfers and mobility. Pt is scheduled for L TKR on 2/9/22.

## 2022-01-28 NOTE — H&P PST ADULT - NSICDXPASTSURGICALHX_GEN_ALL_CORE_FT
PAST SURGICAL HISTORY:  H/O colonoscopy 2015    H/O spinal fusion 2000  rods cervical and lumbar spine    H/O total knee replacement, right 2021    History of cataract surgery (Bilateral eyes)    History of tonsillectomy (Age 12)    S/P carotid endarterectomy (Right, 2/2018)     PAST SURGICAL HISTORY:  H/O colonoscopy 2015    H/O spinal fusion 2000  rods cervical and lumbar spine    H/O total knee replacement, right 2021    History of cataract surgery (Bilateral eyes)    History of tonsillectomy (Age 12)    S/P carotid endarterectomy (Right, 2/2018)    S/P total knee replacement, right revision done Dec 2021

## 2022-01-28 NOTE — H&P PST ADULT - PROBLEM SELECTOR PLAN 1
Left total knee replacement  labs - cbc,pt/ptt,bmp,t&s,nose cx,ekg  M/C required  pulmonary clearance  preop 3 day hibiclens instruction reviewed and given .instructed on if  nose cx positive use mupuricin 5 days and checklist given  take routine meds DOS with sips of water. avoid NSAID and OTC supplements. verbalized understanding  information on proper nutrition , increase protein and better food choices provided in packet   ensure clear given Left total knee replacement  labs - cbc,pt/ptt,bmp,t&s,nose cx,ekg  M/C required  preop 3 day hibiclens instruction reviewed and given .instructed on if  nose cx positive use mupuricin 5 days and checklist given  take routine meds DOS with sips of water. avoid NSAID and OTC supplements. verbalized understanding  information on proper nutrition , increase protein and better food choices provided in packet   ensure clear given

## 2022-01-28 NOTE — OCCUPATIONAL THERAPY INITIAL EVALUATION ADULT - ADDITIONAL COMMENTS
Pt lives with wife in a private house, 4 entry steps (B/L far apart rails), no steps inside to negotiate. Pt has a walk-in shower with a retractable shower head, standard toilet seat height, & + grab bars. Pt is currently independent with all functional mobility and ADLS with straight cane. Expresses having difficulty with ADLS since R TKR Revision sx. Pt also owns rolling walker & commode ( in good working condition & easily accessible). Pt reports L knee 3-9/10 pain R knee 8/10 at rest & states it is worse with any activity 10/10. Pt endorses taking Tylenol/Tramadol for pain management.  Pt has adverse effects with pain medication but unable to recall name of medication. Pt is not on out-pt physical therapy at present. There is no h/o fall or knee buckling in the past 6 months.  Pt is right hand dominant, B hearing aids, wears distance eye glasses, drives, & is retired.

## 2022-01-28 NOTE — H&P PST ADULT - HISTORY OF PRESENT ILLNESS
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   Goal: to walk without pain.  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  Was scheduled to have surgery in early jan, however eduard test positive in [facility]enu's office, so surgery postpones, now rescheduled for 2/9/22. All tests repeated.    He denies fever, SOB, recent travels, and sick contacts.   Goal: to walk without pain.

## 2022-01-28 NOTE — H&P PST ADULT - ASSESSMENT
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)  and COPD c/o left knee pain 2/2 unilateral primary osteoarthritis here for PST for scheduled left total knee replacement  CAPRINI SCORE    AGE RELATED RISK FACTORS                                                       MOBILITY RELATED FACTORS  [ ] Age 41-60 years                                            (1 Point)                  [ ] Bed rest                                                        (1 Point)  [ ] Age: 61-74 years                                           (2 Points)                [ ] Plaster cast                                                   (2 Points)  [ x] Age= 75 years                                              (3 Points)                 [ ] Bed bound for more than 72 hours                   (2 Points)    DISEASE RELATED RISK FACTORS                                               GENDER SPECIFIC FACTORS  [ x] Edema in the lower extremities                       (1 Point)                  [ ] Pregnancy                                                     (1 Point)  [ ] Varicose veins                                               (1 Point)                  [ ] Post-partum < 6 weeks                                   (1 Point)             [x ] BMI > 25 Kg/m2                                            (1 Point)                  [ ] Hormonal therapy  or oral contraception            (1 Point)                 [ ] Sepsis (in the previous month)                        (1 Point)                  [ ] History of pregnancy complications  [ ] Pneumonia or serious lung disease                                               [ ] Unexplained or recurrent                       (1 Point)           (in the previous month)                               (1 Point)  [ ] Abnormal pulmonary function test                     (1 Point)                 SURGERY RELATED RISK FACTORS  [ ] Acute myocardial infarction                              (1 Point)                 [ ]  Section                                            (1 Point)  [ ] Congestive heart failure (in the previous month)  (1 Point)                 [ ] Minor surgery                                                 (1 Point)   [ ] Inflammatory bowel disease                             (1 Point)                 [ ] Arthroscopic surgery                                        (2 Points)  [ ] Central venous access                                    (2 Points)                [ ] General surgery lasting more than 45 minutes   (2 Points)       [ ] Stroke (in the previous month)                          (5 Points)               [ x] Elective arthroplasty                                        (5 Points)                                                                                                                                               HEMATOLOGY RELATED FACTORS                                                 TRAUMA RELATED RISK FACTORS  [ ] Prior episodes of VTE                                     (3 Points)                 [ ] Fracture of the hip, pelvis, or leg                       (5 Points)  [ ] Positive family history for VTE                         (3 Points)                 [ ] Acute spinal cord injury (in the previous month)  (5 Points)  [ ] Prothrombin 55872 A                                      (3 Points)                 [ ] Paralysis  (less than 1 month)                          (5 Points)  [ ] Factor V Leiden                                             (3 Points)                 [ ] Multiple Trauma within 1 month                         (5 Points)  [ ] Lupus anticoagulants                                     (3 Points)                                                           [ ] Anticardiolipin antibodies                                (3 Points)                                                       [ ] High homocysteine in the blood                      (3 Points)                                             [ ] Other congenital or acquired thrombophilia       (3 Points)                                                [ ] Heparin induced thrombocytopenia                  (3 Points)                                          Total Score [      10   ] 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)  and COPD c/o left knee pain 2/2 unilateral primary osteoarthritis here for PST for scheduled left total knee replacement  CAPRINI SCORE    AGE RELATED RISK FACTORS                                                       MOBILITY RELATED FACTORS  [ ] Age 41-60 years                                            (1 Point)                  [ ] Bed rest                                                        (1 Point)  [ ] Age: 61-74 years                                           (2 Points)                [ ] Plaster cast                                                   (2 Points)  [ x] Age= 75 years                                              (3 Points)                 [ ] Bed bound for more than 72 hours                   (2 Points)    DISEASE RELATED RISK FACTORS                                               GENDER SPECIFIC FACTORS  [ x] Edema in the lower extremities                       (1 Point)                  [ ] Pregnancy                                                     (1 Point)  [ ] Varicose veins                                               (1 Point)                  [ ] Post-partum < 6 weeks                                   (1 Point)             [x ] BMI > 25 Kg/m2                                            (1 Point)                  [ ] Hormonal therapy  or oral contraception            (1 Point)                 [ ] Sepsis (in the previous month)                        (1 Point)                  [ ] History of pregnancy complications  [ ] Pneumonia or serious lung disease                                               [ ] Unexplained or recurrent                       (1 Point)           (in the previous month)                               (1 Point)  [ ] Abnormal pulmonary function test                     (1 Point)                 SURGERY RELATED RISK FACTORS  [ ] Acute myocardial infarction                              (1 Point)                 [ ]  Section                                            (1 Point)  [ ] Congestive heart failure (in the previous month)  (1 Point)                 [ ] Minor surgery                                                 (1 Point)   [ ] Inflammatory bowel disease                             (1 Point)                 [ ] Arthroscopic surgery                                        (2 Points)  [ ] Central venous access                                    (2 Points)                [ ] General surgery lasting more than 45 minutes   (2 Points)       [ ] Stroke (in the previous month)                          (5 Points)               [ x] Elective arthroplasty                                        (5 Points)                                                                                                                                               HEMATOLOGY RELATED FACTORS                                                 TRAUMA RELATED RISK FACTORS  [ ] Prior episodes of VTE                                     (3 Points)                 [ ] Fracture of the hip, pelvis, or leg                       (5 Points)  [ ] Positive family history for VTE                         (3 Points)                 [ ] Acute spinal cord injury (in the previous month)  (5 Points)  [ ] Prothrombin 85691 A                                      (3 Points)                 [ ] Paralysis  (less than 1 month)                          (5 Points)  [ ] Factor V Leiden                                             (3 Points)                 [ ] Multiple Trauma within 1 month                         (5 Points)  [ ] Lupus anticoagulants                                     (3 Points)                                                           [ ] Anticardiolipin antibodies                                (3 Points)                                                       [ ] High homocysteine in the blood                      (3 Points)                                             [ ] Other congenital or acquired thrombophilia       (3 Points)                                                [ ] Heparin induced thrombocytopenia                  (3 Points)                                          Total Score [     9   ]

## 2022-01-29 LAB
MRSA PCR RESULT.: SIGNIFICANT CHANGE UP
S AUREUS DNA NOSE QL NAA+PROBE: SIGNIFICANT CHANGE UP

## 2022-02-03 ENCOUNTER — APPOINTMENT (OUTPATIENT)
Dept: FAMILY MEDICINE | Facility: CLINIC | Age: 77
End: 2022-02-03
Payer: MEDICARE

## 2022-02-03 VITALS
SYSTOLIC BLOOD PRESSURE: 120 MMHG | DIASTOLIC BLOOD PRESSURE: 80 MMHG | HEART RATE: 83 BPM | TEMPERATURE: 98.6 F | HEIGHT: 65 IN | OXYGEN SATURATION: 98 % | WEIGHT: 161 LBS | BODY MASS INDEX: 26.82 KG/M2

## 2022-02-03 PROCEDURE — 99214 OFFICE O/P EST MOD 30 MIN: CPT | Mod: 25

## 2022-02-03 RX ORDER — FINASTERIDE 5 MG/1
5 TABLET, FILM COATED ORAL DAILY
Qty: 90 | Refills: 0 | Status: DISCONTINUED | COMMUNITY
Start: 2021-08-31 | End: 2022-02-03

## 2022-02-09 RX ORDER — ASPIRIN/CALCIUM CARB/MAGNESIUM 324 MG
81 TABLET ORAL
Refills: 0 | Status: DISCONTINUED | OUTPATIENT
Start: 2022-02-23 | End: 2022-02-25

## 2022-02-09 RX ORDER — ONDANSETRON 8 MG/1
4 TABLET, FILM COATED ORAL EVERY 6 HOURS
Refills: 0 | Status: DISCONTINUED | OUTPATIENT
Start: 2022-02-23 | End: 2022-02-25

## 2022-02-09 RX ORDER — ALBUTEROL 90 UG/1
2 AEROSOL, METERED ORAL EVERY 6 HOURS
Refills: 0 | Status: DISCONTINUED | OUTPATIENT
Start: 2022-02-23 | End: 2022-02-25

## 2022-02-09 RX ORDER — ACETAMINOPHEN 500 MG
975 TABLET ORAL EVERY 8 HOURS
Refills: 0 | Status: DISCONTINUED | OUTPATIENT
Start: 2022-02-23 | End: 2022-02-25

## 2022-02-09 RX ORDER — MAGNESIUM HYDROXIDE 400 MG/1
30 TABLET, CHEWABLE ORAL DAILY
Refills: 0 | Status: DISCONTINUED | OUTPATIENT
Start: 2022-02-23 | End: 2022-02-25

## 2022-02-09 RX ORDER — ASCORBIC ACID 60 MG
500 TABLET,CHEWABLE ORAL
Refills: 0 | Status: DISCONTINUED | OUTPATIENT
Start: 2022-02-23 | End: 2022-02-25

## 2022-02-09 RX ORDER — PANTOPRAZOLE SODIUM 20 MG/1
40 TABLET, DELAYED RELEASE ORAL
Refills: 0 | Status: DISCONTINUED | OUTPATIENT
Start: 2022-02-23 | End: 2022-02-25

## 2022-02-09 RX ORDER — AMLODIPINE BESYLATE 2.5 MG/1
5 TABLET ORAL DAILY
Refills: 0 | Status: DISCONTINUED | OUTPATIENT
Start: 2022-02-23 | End: 2022-02-25

## 2022-02-09 RX ORDER — SENNA PLUS 8.6 MG/1
2 TABLET ORAL AT BEDTIME
Refills: 0 | Status: DISCONTINUED | OUTPATIENT
Start: 2022-02-23 | End: 2022-02-25

## 2022-02-09 RX ORDER — LANOLIN ALCOHOL/MO/W.PET/CERES
3 CREAM (GRAM) TOPICAL AT BEDTIME
Refills: 0 | Status: DISCONTINUED | OUTPATIENT
Start: 2022-02-23 | End: 2022-02-25

## 2022-02-09 RX ORDER — POLYETHYLENE GLYCOL 3350 17 G/17G
17 POWDER, FOR SOLUTION ORAL AT BEDTIME
Refills: 0 | Status: DISCONTINUED | OUTPATIENT
Start: 2022-02-23 | End: 2022-02-25

## 2022-02-22 ENCOUNTER — TRANSCRIPTION ENCOUNTER (OUTPATIENT)
Age: 77
End: 2022-02-22

## 2022-02-23 ENCOUNTER — TRANSCRIPTION ENCOUNTER (OUTPATIENT)
Age: 77
End: 2022-02-23

## 2022-02-23 ENCOUNTER — RESULT REVIEW (OUTPATIENT)
Age: 77
End: 2022-02-23

## 2022-02-23 ENCOUNTER — INPATIENT (INPATIENT)
Facility: HOSPITAL | Age: 77
LOS: 1 days | Discharge: HOME | End: 2022-02-25
Attending: ORTHOPAEDIC SURGERY | Admitting: ORTHOPAEDIC SURGERY
Payer: MEDICARE

## 2022-02-23 VITALS
OXYGEN SATURATION: 97 % | RESPIRATION RATE: 16 BRPM | TEMPERATURE: 98 F | HEIGHT: 66 IN | SYSTOLIC BLOOD PRESSURE: 159 MMHG | WEIGHT: 156.09 LBS | DIASTOLIC BLOOD PRESSURE: 82 MMHG | HEART RATE: 79 BPM

## 2022-02-23 DIAGNOSIS — Z98.890 OTHER SPECIFIED POSTPROCEDURAL STATES: Chronic | ICD-10-CM

## 2022-02-23 DIAGNOSIS — Z98.1 ARTHRODESIS STATUS: Chronic | ICD-10-CM

## 2022-02-23 DIAGNOSIS — Z98.49 CATARACT EXTRACTION STATUS, UNSPECIFIED EYE: Chronic | ICD-10-CM

## 2022-02-23 DIAGNOSIS — Z96.651 PRESENCE OF RIGHT ARTIFICIAL KNEE JOINT: Chronic | ICD-10-CM

## 2022-02-23 LAB
ANION GAP SERPL CALC-SCNC: 4 MMOL/L — LOW (ref 5–17)
BUN SERPL-MCNC: 24 MG/DL — HIGH (ref 7–23)
CALCIUM SERPL-MCNC: 9 MG/DL — SIGNIFICANT CHANGE UP (ref 8.5–10.1)
CHLORIDE SERPL-SCNC: 114 MMOL/L — HIGH (ref 96–108)
CO2 SERPL-SCNC: 25 MMOL/L — SIGNIFICANT CHANGE UP (ref 22–31)
CREAT SERPL-MCNC: 1.44 MG/DL — HIGH (ref 0.5–1.3)
GLUCOSE SERPL-MCNC: 88 MG/DL — SIGNIFICANT CHANGE UP (ref 70–99)
HCT VFR BLD CALC: 45.4 % — SIGNIFICANT CHANGE UP (ref 39–50)
HGB BLD-MCNC: 14.7 G/DL — SIGNIFICANT CHANGE UP (ref 13–17)
MCHC RBC-ENTMCNC: 31.3 PG — SIGNIFICANT CHANGE UP (ref 27–34)
MCHC RBC-ENTMCNC: 32.4 G/DL — SIGNIFICANT CHANGE UP (ref 32–36)
MCV RBC AUTO: 96.8 FL — SIGNIFICANT CHANGE UP (ref 80–100)
NRBC # BLD: 0 /100 WBCS — SIGNIFICANT CHANGE UP (ref 0–0)
PLATELET # BLD AUTO: 224 K/UL — SIGNIFICANT CHANGE UP (ref 150–400)
POTASSIUM SERPL-MCNC: 4.6 MMOL/L — SIGNIFICANT CHANGE UP (ref 3.5–5.3)
POTASSIUM SERPL-SCNC: 4.6 MMOL/L — SIGNIFICANT CHANGE UP (ref 3.5–5.3)
RBC # BLD: 4.69 M/UL — SIGNIFICANT CHANGE UP (ref 4.2–5.8)
RBC # FLD: 14.6 % — HIGH (ref 10.3–14.5)
SODIUM SERPL-SCNC: 143 MMOL/L — SIGNIFICANT CHANGE UP (ref 135–145)
WBC # BLD: 7.57 K/UL — SIGNIFICANT CHANGE UP (ref 3.8–10.5)
WBC # FLD AUTO: 7.57 K/UL — SIGNIFICANT CHANGE UP (ref 3.8–10.5)

## 2022-02-23 PROCEDURE — 88305 TISSUE EXAM BY PATHOLOGIST: CPT | Mod: 26

## 2022-02-23 PROCEDURE — 88311 DECALCIFY TISSUE: CPT | Mod: 26

## 2022-02-23 DEVICE — INSERT ITOTAL IDENTITY CR FULL KIT PRICE 2 PC IPOLY XE: Type: IMPLANTABLE DEVICE | Site: LEFT | Status: FUNCTIONAL

## 2022-02-23 DEVICE — IMP PATELLA ITOTAL IPOLY XE 32X62MM: Type: IMPLANTABLE DEVICE | Site: LEFT | Status: FUNCTIONAL

## 2022-02-23 DEVICE — CEMENT BONE SIMPLEX P 40 GM: Type: IMPLANTABLE DEVICE | Site: LEFT | Status: FUNCTIONAL

## 2022-02-23 RX ORDER — TRAMADOL HYDROCHLORIDE 50 MG/1
50 TABLET ORAL EVERY 4 HOURS
Refills: 0 | Status: DISCONTINUED | OUTPATIENT
Start: 2022-02-23 | End: 2022-02-25

## 2022-02-23 RX ORDER — ACETAMINOPHEN 500 MG
1000 TABLET ORAL ONCE
Refills: 0 | Status: COMPLETED | OUTPATIENT
Start: 2022-02-23 | End: 2022-02-23

## 2022-02-23 RX ORDER — FENTANYL CITRATE 50 UG/ML
25 INJECTION INTRAVENOUS
Refills: 0 | Status: DISCONTINUED | OUTPATIENT
Start: 2022-02-23 | End: 2022-02-23

## 2022-02-23 RX ORDER — SODIUM CHLORIDE 9 MG/ML
1000 INJECTION, SOLUTION INTRAVENOUS
Refills: 0 | Status: DISCONTINUED | OUTPATIENT
Start: 2022-02-23 | End: 2022-02-24

## 2022-02-23 RX ORDER — ONDANSETRON 8 MG/1
4 TABLET, FILM COATED ORAL ONCE
Refills: 0 | Status: DISCONTINUED | OUTPATIENT
Start: 2022-02-23 | End: 2022-02-23

## 2022-02-23 RX ORDER — BUDESONIDE AND FORMOTEROL FUMARATE DIHYDRATE 160; 4.5 UG/1; UG/1
2 AEROSOL RESPIRATORY (INHALATION)
Refills: 0 | Status: DISCONTINUED | OUTPATIENT
Start: 2022-02-23 | End: 2022-02-23

## 2022-02-23 RX ORDER — CELECOXIB 200 MG/1
200 CAPSULE ORAL ONCE
Refills: 0 | Status: COMPLETED | OUTPATIENT
Start: 2022-02-23 | End: 2022-02-23

## 2022-02-23 RX ORDER — HYDROMORPHONE HYDROCHLORIDE 2 MG/ML
0.5 INJECTION INTRAMUSCULAR; INTRAVENOUS; SUBCUTANEOUS EVERY 4 HOURS
Refills: 0 | Status: DISCONTINUED | OUTPATIENT
Start: 2022-02-23 | End: 2022-02-23

## 2022-02-23 RX ORDER — VANCOMYCIN HCL 1 G
1000 VIAL (EA) INTRAVENOUS ONCE
Refills: 0 | Status: COMPLETED | OUTPATIENT
Start: 2022-02-23 | End: 2022-02-23

## 2022-02-23 RX ORDER — ACETAMINOPHEN 500 MG
650 TABLET ORAL ONCE
Refills: 0 | Status: COMPLETED | OUTPATIENT
Start: 2022-02-23 | End: 2022-02-23

## 2022-02-23 RX ORDER — SODIUM CHLORIDE 9 MG/ML
1000 INJECTION, SOLUTION INTRAVENOUS
Refills: 0 | Status: DISCONTINUED | OUTPATIENT
Start: 2022-02-23 | End: 2022-02-23

## 2022-02-23 RX ORDER — CELECOXIB 200 MG/1
200 CAPSULE ORAL EVERY 12 HOURS
Refills: 0 | Status: DISCONTINUED | OUTPATIENT
Start: 2022-02-24 | End: 2022-02-24

## 2022-02-23 RX ORDER — OXYCODONE HYDROCHLORIDE 5 MG/1
10 TABLET ORAL
Refills: 0 | Status: DISCONTINUED | OUTPATIENT
Start: 2022-02-23 | End: 2022-02-23

## 2022-02-23 RX ORDER — VANCOMYCIN HCL 1 G
1000 VIAL (EA) INTRAVENOUS ONCE
Refills: 0 | Status: COMPLETED | OUTPATIENT
Start: 2022-02-24 | End: 2022-02-24

## 2022-02-23 RX ORDER — OXYCODONE HYDROCHLORIDE 5 MG/1
5 TABLET ORAL
Refills: 0 | Status: DISCONTINUED | OUTPATIENT
Start: 2022-02-23 | End: 2022-02-23

## 2022-02-23 RX ADMIN — Medication 100 MILLIGRAM(S): at 21:41

## 2022-02-23 RX ADMIN — Medication 250 MILLIGRAM(S): at 13:46

## 2022-02-23 RX ADMIN — TRAMADOL HYDROCHLORIDE 50 MILLIGRAM(S): 50 TABLET ORAL at 22:40

## 2022-02-23 RX ADMIN — Medication 650 MILLIGRAM(S): at 12:52

## 2022-02-23 RX ADMIN — SODIUM CHLORIDE 50 MILLILITER(S): 9 INJECTION, SOLUTION INTRAVENOUS at 17:41

## 2022-02-23 RX ADMIN — POLYETHYLENE GLYCOL 3350 17 GRAM(S): 17 POWDER, FOR SOLUTION ORAL at 21:41

## 2022-02-23 RX ADMIN — SENNA PLUS 2 TABLET(S): 8.6 TABLET ORAL at 21:41

## 2022-02-23 RX ADMIN — CELECOXIB 200 MILLIGRAM(S): 200 CAPSULE ORAL at 12:52

## 2022-02-23 RX ADMIN — Medication 400 MILLIGRAM(S): at 23:55

## 2022-02-23 RX ADMIN — Medication 3 MILLIGRAM(S): at 23:55

## 2022-02-23 RX ADMIN — TRAMADOL HYDROCHLORIDE 50 MILLIGRAM(S): 50 TABLET ORAL at 21:41

## 2022-02-23 RX ADMIN — SODIUM CHLORIDE 125 MILLILITER(S): 9 INJECTION, SOLUTION INTRAVENOUS at 21:50

## 2022-02-23 NOTE — DISCHARGE NOTE PROVIDER - NSDCFUADDAPPT_GEN_ALL_CORE_FT
Follow up with your surgeon in two weeks. Call for appointment.  If you need more pain medication, call your surgeon's office. For medication refills or authorizations, please call [phone]  We recommend that you call and schedule a follow up appointment within 2-4 weeks with your primary care physician for repeat blood work (CBC and BMP) for post hospital discharge follow-up care.  Call your surgeon if you have increased redness/pain/drainage or fever. Return to ER for shortness of breath/calf tenderness.

## 2022-02-23 NOTE — OCCUPATIONAL THERAPY INITIAL EVALUATION ADULT - GENERAL OBSERVATIONS, REHAB EVAL
Pt encountered semisupine in bed, NAD, AXOX4, +heplock, +ACE L knee c/d/i, +daniel, +hearing aids bilaterally, +TEDs, pt c/o pain s/p left TKA

## 2022-02-23 NOTE — ASU PREOP CHECKLIST - ISOLATION PRECAUTIONS
Patient comes to clinic for follow up anticoagulation visit.  Last INR on 3/25/21 was 3.9.  Dose decreased.   Today's INR is 4.9 and is above goal range.    Current warfarin total weekly dose of 25 mg verified.  Informed the INR result is above therapeutic range and instructed to hold Warfarin x2 days, then decrease current dose per protocol. Discussed dose and return date of 4/8/21 for next INR. See Anticoagulation flowsheet.    Dr. Ho is in the office today supervising the treatment.    Call your physician or seek medical care immediately if you notice any of the following symptoms of a bleed:   · Red, dark, coffee or cola colored urine  · Red or tar like stools  · Excessive bleeding from gums or nose  · Vomiting coffee colored or bright red material  · Coughing up red tinged sputum  · Severe or unprovoked pain (ex: severe Headache or Abdominal pain)  · Sudden, spontaneous bruising for no reason  · For female patients:  Excessive menstrual bleeding  · A cut that will not stop bleeding within 10-15 mins  · Symptoms associated with abnormal bleeding/high INR reviewed.    Encouraged to avoid activities that may result in a serious fall or injury and verbalizes understanding.    Instructed to contact the clinic with any unusual bleeding or bruising, any changes in medications, diet, health status, lifestyle, or any other changes, questions or concerns. Verbalized understanding of all discussed.    none

## 2022-02-23 NOTE — DISCHARGE NOTE PROVIDER - CARE PROVIDER_API CALL
Mitul Daugherty)  Orthopaedic Surgery  [address] [place], [place] [address]  Phone: [phone]  Fax: [phone]  Follow Up Time:

## 2022-02-23 NOTE — PHYSICAL THERAPY INITIAL EVALUATION ADULT - ADDITIONAL COMMENTS
Pre-op verified and confirmed: Pt  lives with wife in a private house, 4 entry steps (B/L far apart rails), no steps inside to negotiate. Pt has a walk-in shower with a retractable shower head, standard toilet seat height, & + grab bars. Pt is currently independent with all functional mobility and ADLS with straight cane. Expresses having difficulty with ADLS since R TKR Revision sx. Pt also owns rolling walker & commode ( in good working condition & easily accessible).  Pt is right hand dominant, B hearing aids, wears distance eye glasses, drives, & is retired. R TKR [id number]/12.

## 2022-02-23 NOTE — DISCHARGE NOTE PROVIDER - NSDCMRMEDTOKEN_GEN_ALL_CORE_FT
acetaminophen 325 mg oral tablet: 3 tab(s) orally every 8 hours, As needed, Mild Pain (1 - 3)  amLODIPine 5 mg oral tablet: 1 tab(s) orally once a day  ascorbic acid 500 mg oral tablet: 1 tab(s) orally 2 times a day  Breo Ellipta 200 mcg-25 mcg/inh inhalation powder: 1 puff(s) inhaled once a day, As Needed  Multiple Vitamins oral tablet: 1 tab(s) orally once a day  omeprazole 40 mg oral delayed release capsule: 1 cap(s) orally 2 times a day  ProAir HFA 90 mcg/inh inhalation aerosol: 2 puff(s) inhaled 4 times a day, As Needed   acetaminophen 325 mg oral tablet: 3 tab(s) orally every 8 hours, As needed, Mild Pain (1 - 3)  amLODIPine 5 mg oral tablet: 1 tab(s) orally once a day  ascorbic acid 500 mg oral tablet: 1 tab(s) orally 2 times a day  Aspirin Enteric Coated 81 mg oral delayed release tablet: 1 tab(s) orally 2 times a day MDD:2  Breo Ellipta 200 mcg-25 mcg/inh inhalation powder: 1 puff(s) inhaled once a day, As Needed  Multiple Vitamins oral tablet: 1 tab(s) orally once a day  omeprazole 40 mg oral delayed release capsule: 1 cap(s) orally 2 times a day  oxyCODONE 5 mg oral tablet: 1- 2 tab(s) orally every 4 hours, As Needed -Pain MDD:10  polyethylene glycol 3350 oral powder for reconstitution: 17 gram(s) orally once a day (at bedtime)  ProAir HFA 90 mcg/inh inhalation aerosol: 2 puff(s) inhaled 4 times a day, As Needed  senna oral tablet: 2 tab(s) orally once a day (at bedtime)

## 2022-02-23 NOTE — PHYSICAL THERAPY INITIAL EVALUATION ADULT - ACTIVE RANGE OF MOTION EXAMINATION, REHAB EVAL
L knee flexion 65 degrees, extension - 10 degrees/Right LE Active ROM was WFL (within functional limits)/deficits as listed below

## 2022-02-23 NOTE — DISCHARGE NOTE PROVIDER - NSDCCPCAREPLAN_GEN_ALL_CORE_FT
PRINCIPAL DISCHARGE DIAGNOSIS  Diagnosis: Osteoarthritis of knee  Assessment and Plan of Treatment:

## 2022-02-23 NOTE — OCCUPATIONAL THERAPY INITIAL EVALUATION ADULT - RANGE OF MOTION EXAMINATION, LOWER EXTREMITY
Left LE knee AROM flexion/extension decreased/Right LE Active ROM was WFL   (within functional limits)

## 2022-02-23 NOTE — PATIENT PROFILE ADULT - FALL HARM RISK - RISK INTERVENTIONS

## 2022-02-23 NOTE — PHYSICAL THERAPY INITIAL EVALUATION ADULT - PLANNED THERAPY INTERVENTIONS, PT EVAL
To be able to perform stair negotiation with 1 HR device and 1 hand rails Independently to improve access and exiting from home and access to bedrooms, basement and bathrooms by 2 weeks/balance training/gait training/ROM/strengthening/transfer training

## 2022-02-23 NOTE — OCCUPATIONAL THERAPY INITIAL EVALUATION ADULT - ANTICIPATED DISCHARGE DISPOSITION, OT EVAL
Home with home OT for home safety evaluation and to improve functional ADLs and transfers/mobility. Pt has rolling walker, 3:1 commode and cane in good working condition.

## 2022-02-23 NOTE — OCCUPATIONAL THERAPY INITIAL EVALUATION ADULT - ADDITIONAL COMMENTS
Pt lives in a private home c wife (able to assist post-operatively) c 5 stairs to enter c B/L wide handrails. Once inside, pt main bedroom/bathroom is on one level. Pt's bathroom is equipped c a walk-in shower +grab bars, retractable shower head, and standard height toilet seat. Pt reports there is adequate space over toilet to fit 3:1 commode and owns 3:1 commode. Pt is right hand dominant, wears glasses for reading/distance and bilateral hearing aids.

## 2022-02-23 NOTE — PHYSICAL THERAPY INITIAL EVALUATION ADULT - RANGE OF MOTION EXAMINATION, REHAB EVAL
L knee flexion 65 degrees, extension - 10 degrees./Right LE ROM was WFL (within functional limits)/deficits as listed below

## 2022-02-23 NOTE — PHYSICAL THERAPY INITIAL EVALUATION ADULT - CRITERIA FOR SKILLED THERAPEUTIC INTERVENTIONS
home with home PT/impairments found/functional limitations in following categories/risk reduction/prevention/rehab potential/therapy frequency/anticipated discharge recommendation

## 2022-02-23 NOTE — DISCHARGE NOTE PROVIDER - HOSPITAL COURSE
77yMale with history of Left knee OA presenting for L TKA by Dr. Daugherty on 2/23/22. Risk and benefits of surgery were explained to the patient. The patient understood and agreed to proceed with surgery. Patient underwent the procedure with no intraoperative complications. Pt was brought in stable condition to the PACU. Once stable in PACU, pt was brought to the floor. During hospital stay pt was followed by Medicine, physical therapy, Home Care during this admission. Pt had an uneventful hospital course. Pt is stable for discharge to home 77yMale with history of Left knee OA presenting for L TKA by Dr. Daugherty on 2/23/22. Risk and benefits of surgery were explained to the patient. The patient understood and agreed to proceed with surgery. Patient underwent the procedure with no intraoperative complications. Pt was brought in stable condition to the PACU. Once stable in PACU, pt was brought to the floor. During hospital stay pt was followed by Medicine, physical therapy, Home Care during this admission. Pt had an acute kidney injury that resolved after IVF hydration. Pt is stable for discharge to home on POD#2.

## 2022-02-23 NOTE — PHYSICAL THERAPY INITIAL EVALUATION ADULT - GAIT TRAINING, PT EVAL
To be able to perform ambulation independently using rolling walker for 200 feet, using proper technique using AD, with proper posture and functional distance at home  in 2 weeks.

## 2022-02-23 NOTE — DISCHARGE NOTE PROVIDER - NSDCFUADDINST_GEN_ALL_CORE_FT
Keep knee straight while at rest. Elevate the leg as much as possible ("toes above the nose") to help control swelling. Make sure you get up and take a brief walk every two hours to help with circulation and prevent stiffness. Incentive spirometer 10X/hour. Cryocuff to help with pain/inflammation.     Keep ARABELLA Dressing Clean, Dry and Intact. May shower with ARABELLA Dressing. Please do not scrub, soak, peel or pick at the ARABELLA dressing. No creams, lotions, or oils over dressing. May shower and let water run over dressing, no baths. Pat dry once out of shower. Dressing to be removed in 7 days. Discard dressing and battery in garbage. If dressing is saturated from border to border - may remove and replace with clean dry dressing.    Shower instructions for ARABELLA Dressing: Place battery pack in a water sealed bag (such as a Ziplock bag) and keep battery out of the water.   Alternately you can turn battery pack off (press orange button.) Twist tubing OFF battery pack before entering shower. Once done with showering. Pat dressing dry. Reconnect tubing and twist ON battery pack after you are dry. Then turn battery pack on (press orange button.)

## 2022-02-24 LAB
ANION GAP SERPL CALC-SCNC: 7 MMOL/L — SIGNIFICANT CHANGE UP (ref 5–17)
ANION GAP SERPL CALC-SCNC: 8 MMOL/L — SIGNIFICANT CHANGE UP (ref 5–17)
BUN SERPL-MCNC: 24 MG/DL — HIGH (ref 7–23)
BUN SERPL-MCNC: 25 MG/DL — HIGH (ref 7–23)
CALCIUM SERPL-MCNC: 8.6 MG/DL — SIGNIFICANT CHANGE UP (ref 8.5–10.1)
CALCIUM SERPL-MCNC: 8.7 MG/DL — SIGNIFICANT CHANGE UP (ref 8.5–10.1)
CHLORIDE SERPL-SCNC: 106 MMOL/L — SIGNIFICANT CHANGE UP (ref 96–108)
CHLORIDE SERPL-SCNC: 109 MMOL/L — HIGH (ref 96–108)
CO2 SERPL-SCNC: 25 MMOL/L — SIGNIFICANT CHANGE UP (ref 22–31)
CO2 SERPL-SCNC: 25 MMOL/L — SIGNIFICANT CHANGE UP (ref 22–31)
CREAT SERPL-MCNC: 1.39 MG/DL — HIGH (ref 0.5–1.3)
CREAT SERPL-MCNC: 1.65 MG/DL — HIGH (ref 0.5–1.3)
GLUCOSE SERPL-MCNC: 108 MG/DL — HIGH (ref 70–99)
GLUCOSE SERPL-MCNC: 112 MG/DL — HIGH (ref 70–99)
HCT VFR BLD CALC: 41.4 % — SIGNIFICANT CHANGE UP (ref 39–50)
HGB BLD-MCNC: 13.7 G/DL — SIGNIFICANT CHANGE UP (ref 13–17)
MCHC RBC-ENTMCNC: 31.1 PG — SIGNIFICANT CHANGE UP (ref 27–34)
MCHC RBC-ENTMCNC: 33.1 G/DL — SIGNIFICANT CHANGE UP (ref 32–36)
MCV RBC AUTO: 94.1 FL — SIGNIFICANT CHANGE UP (ref 80–100)
NRBC # BLD: 0 /100 WBCS — SIGNIFICANT CHANGE UP (ref 0–0)
PLATELET # BLD AUTO: 217 K/UL — SIGNIFICANT CHANGE UP (ref 150–400)
POTASSIUM SERPL-MCNC: 3.9 MMOL/L — SIGNIFICANT CHANGE UP (ref 3.5–5.3)
POTASSIUM SERPL-MCNC: 4.2 MMOL/L — SIGNIFICANT CHANGE UP (ref 3.5–5.3)
POTASSIUM SERPL-SCNC: 3.9 MMOL/L — SIGNIFICANT CHANGE UP (ref 3.5–5.3)
POTASSIUM SERPL-SCNC: 4.2 MMOL/L — SIGNIFICANT CHANGE UP (ref 3.5–5.3)
RBC # BLD: 4.4 M/UL — SIGNIFICANT CHANGE UP (ref 4.2–5.8)
RBC # FLD: 14.4 % — SIGNIFICANT CHANGE UP (ref 10.3–14.5)
SODIUM SERPL-SCNC: 138 MMOL/L — SIGNIFICANT CHANGE UP (ref 135–145)
SODIUM SERPL-SCNC: 142 MMOL/L — SIGNIFICANT CHANGE UP (ref 135–145)
WBC # BLD: 11.99 K/UL — HIGH (ref 3.8–10.5)
WBC # FLD AUTO: 11.99 K/UL — HIGH (ref 3.8–10.5)

## 2022-02-24 PROCEDURE — 73560 X-RAY EXAM OF KNEE 1 OR 2: CPT | Mod: 26,LT

## 2022-02-24 RX ORDER — LIDOCAINE 4 G/100G
1 CREAM TOPICAL DAILY
Refills: 0 | Status: DISCONTINUED | OUTPATIENT
Start: 2022-02-24 | End: 2022-02-25

## 2022-02-24 RX ORDER — OXYCODONE HYDROCHLORIDE 5 MG/1
10 TABLET ORAL
Refills: 0 | Status: DISCONTINUED | OUTPATIENT
Start: 2022-02-24 | End: 2022-02-25

## 2022-02-24 RX ORDER — ACETAMINOPHEN 500 MG
1000 TABLET ORAL ONCE
Refills: 0 | Status: COMPLETED | OUTPATIENT
Start: 2022-02-24 | End: 2022-02-24

## 2022-02-24 RX ORDER — SODIUM CHLORIDE 9 MG/ML
1000 INJECTION INTRAMUSCULAR; INTRAVENOUS; SUBCUTANEOUS
Refills: 0 | Status: DISCONTINUED | OUTPATIENT
Start: 2022-02-24 | End: 2022-02-24

## 2022-02-24 RX ORDER — LACTULOSE 10 G/15ML
20 SOLUTION ORAL ONCE
Refills: 0 | Status: COMPLETED | OUTPATIENT
Start: 2022-02-24 | End: 2022-02-24

## 2022-02-24 RX ORDER — SODIUM CHLORIDE 9 MG/ML
1000 INJECTION INTRAMUSCULAR; INTRAVENOUS; SUBCUTANEOUS ONCE
Refills: 0 | Status: DISCONTINUED | OUTPATIENT
Start: 2022-02-24 | End: 2022-02-24

## 2022-02-24 RX ORDER — OXYCODONE HYDROCHLORIDE 5 MG/1
5 TABLET ORAL
Refills: 0 | Status: DISCONTINUED | OUTPATIENT
Start: 2022-02-24 | End: 2022-02-25

## 2022-02-24 RX ORDER — ACETAMINOPHEN 500 MG
1000 TABLET ORAL ONCE
Refills: 0 | Status: COMPLETED | OUTPATIENT
Start: 2022-02-24 | End: 2023-01-23

## 2022-02-24 RX ORDER — SODIUM CHLORIDE 9 MG/ML
1000 INJECTION INTRAMUSCULAR; INTRAVENOUS; SUBCUTANEOUS
Refills: 0 | Status: DISCONTINUED | OUTPATIENT
Start: 2022-02-24 | End: 2022-02-25

## 2022-02-24 RX ORDER — SODIUM CHLORIDE 9 MG/ML
1000 INJECTION INTRAMUSCULAR; INTRAVENOUS; SUBCUTANEOUS ONCE
Refills: 0 | Status: COMPLETED | OUTPATIENT
Start: 2022-02-24 | End: 2022-02-24

## 2022-02-24 RX ADMIN — AMLODIPINE BESYLATE 5 MILLIGRAM(S): 2.5 TABLET ORAL at 05:13

## 2022-02-24 RX ADMIN — TRAMADOL HYDROCHLORIDE 50 MILLIGRAM(S): 50 TABLET ORAL at 09:30

## 2022-02-24 RX ADMIN — Medication 1000 MILLIGRAM(S): at 00:10

## 2022-02-24 RX ADMIN — Medication 1 TABLET(S): at 11:29

## 2022-02-24 RX ADMIN — Medication 81 MILLIGRAM(S): at 05:12

## 2022-02-24 RX ADMIN — TRAMADOL HYDROCHLORIDE 50 MILLIGRAM(S): 50 TABLET ORAL at 08:34

## 2022-02-24 RX ADMIN — SENNA PLUS 2 TABLET(S): 8.6 TABLET ORAL at 22:35

## 2022-02-24 RX ADMIN — LIDOCAINE 1 PATCH: 4 CREAM TOPICAL at 08:06

## 2022-02-24 RX ADMIN — SODIUM CHLORIDE 500 MILLILITER(S): 9 INJECTION INTRAMUSCULAR; INTRAVENOUS; SUBCUTANEOUS at 08:02

## 2022-02-24 RX ADMIN — Medication 400 MILLIGRAM(S): at 19:31

## 2022-02-24 RX ADMIN — TRAMADOL HYDROCHLORIDE 50 MILLIGRAM(S): 50 TABLET ORAL at 17:00

## 2022-02-24 RX ADMIN — Medication 1000 MILLIGRAM(S): at 19:46

## 2022-02-24 RX ADMIN — Medication 250 MILLIGRAM(S): at 00:57

## 2022-02-24 RX ADMIN — PANTOPRAZOLE SODIUM 40 MILLIGRAM(S): 20 TABLET, DELAYED RELEASE ORAL at 05:13

## 2022-02-24 RX ADMIN — OXYCODONE HYDROCHLORIDE 5 MILLIGRAM(S): 5 TABLET ORAL at 22:35

## 2022-02-24 RX ADMIN — TRAMADOL HYDROCHLORIDE 50 MILLIGRAM(S): 50 TABLET ORAL at 16:18

## 2022-02-24 RX ADMIN — Medication 3 MILLIGRAM(S): at 22:35

## 2022-02-24 RX ADMIN — Medication 500 MILLIGRAM(S): at 17:57

## 2022-02-24 RX ADMIN — LIDOCAINE 1 PATCH: 4 CREAM TOPICAL at 18:14

## 2022-02-24 RX ADMIN — Medication 100 MILLIGRAM(S): at 06:15

## 2022-02-24 RX ADMIN — LACTULOSE 20 GRAM(S): 10 SOLUTION ORAL at 08:06

## 2022-02-24 RX ADMIN — Medication 81 MILLIGRAM(S): at 17:57

## 2022-02-24 RX ADMIN — LIDOCAINE 1 PATCH: 4 CREAM TOPICAL at 19:35

## 2022-02-24 RX ADMIN — POLYETHYLENE GLYCOL 3350 17 GRAM(S): 17 POWDER, FOR SOLUTION ORAL at 22:35

## 2022-02-24 RX ADMIN — SODIUM CHLORIDE 125 MILLILITER(S): 9 INJECTION INTRAMUSCULAR; INTRAVENOUS; SUBCUTANEOUS at 11:23

## 2022-02-24 RX ADMIN — Medication 500 MILLIGRAM(S): at 05:12

## 2022-02-24 NOTE — CONSULT NOTE ADULT - SUBJECTIVE AND OBJECTIVE BOX
DHRUV DONOHUE is a 77y Male s/p LEFT TOTAL KNEE REPLACEMENT      w/ h/o Hypertension    Hypercholesteremia    COPD (chronic obstructive pulmonary disease)    Stenosis of right carotid artery    GERD (gastroesophageal reflux disease)    Deviated septum    Other and unspecified ventral hernia with obstruction, without gangrene    Sleep apnea      denies any chest pain shortness of breath palpitation dizziness lightheadedness nausea vomiting fever or chills    Personal history of spine surgery    History of tonsillectomy    History of cataract surgery    H/O spinal fusion    H/O colonoscopy    S/P carotid endarterectomy    H/O total knee replacement, right    S/P total knee replacement, right      Family history of CHF (congestive heart failure) (Sibling)    MI (myocardial infarction) (Sibling)      SH: doesnot smoke or drink at this time    diphtheria-tetanus toxoids (Anaphylaxis)  doxycycline (Anaphylaxis)  flu vaccines (Hives)  MethylPREDNISolone Sodium Succinate (Anaphylaxis)  Novocain (Anaphylaxis)  opioid-like analgesics (Other)  penicillin (Anaphylaxis)  rosuvastatin (Muscle Pain)    acetaminophen     Tablet .. 975 milliGRAM(s) Oral every 8 hours PRN  ALBUTerol    90 MICROgram(s) HFA Inhaler 2 Puff(s) Inhalation every 6 hours PRN  amLODIPine   Tablet 5 milliGRAM(s) Oral daily  ascorbic acid 500 milliGRAM(s) Oral two times a day  aspirin enteric coated 81 milliGRAM(s) Oral two times a day  lidocaine   4% Patch 1 Patch Transdermal daily  magnesium hydroxide Suspension 30 milliLiter(s) Oral daily PRN  melatonin 3 milliGRAM(s) Oral at bedtime PRN  multivitamin 1 Tablet(s) Oral daily  ondansetron Injectable 4 milliGRAM(s) IV Push every 6 hours PRN  pantoprazole    Tablet 40 milliGRAM(s) Oral before breakfast  polyethylene glycol 3350 17 Gram(s) Oral at bedtime  senna 2 Tablet(s) Oral at bedtime  sodium chloride 0.9%. 1000 milliLiter(s) IV Continuous <Continuous>  traMADol 50 milliGRAM(s) Oral every 4 hours PRN    T(C): 36.9 (02-24-22 @ 05:00), Max: 37 (02-23-22 @ 19:24)  HR: 78 (02-24-22 @ 09:30) (62 - 91)  BP: 159/71 (02-24-22 @ 09:30) (121/72 - 159/88)  RR: 16 (02-24-22 @ 05:00) (12 - 18)  SpO2: 98% (02-24-22 @ 09:30) (95% - 98%)  HEENT unremarkable  neck no JVD or bruit  heart normal S1 S2 RRR no gallops or rubs  chest clear to auscultation  abd sof nontender non distended +bs  ext no calf tenderness    A/P   DVT PX  pain control  bowel regimen   wound care as per ortho  GI PX  antiemetics prn  incentive spirometer

## 2022-02-25 ENCOUNTER — TRANSCRIPTION ENCOUNTER (OUTPATIENT)
Age: 77
End: 2022-02-25

## 2022-02-25 VITALS
TEMPERATURE: 98 F | DIASTOLIC BLOOD PRESSURE: 78 MMHG | OXYGEN SATURATION: 96 % | HEART RATE: 66 BPM | RESPIRATION RATE: 20 BRPM | SYSTOLIC BLOOD PRESSURE: 149 MMHG

## 2022-02-25 LAB
ANION GAP SERPL CALC-SCNC: 8 MMOL/L — SIGNIFICANT CHANGE UP (ref 5–17)
BUN SERPL-MCNC: 19 MG/DL — SIGNIFICANT CHANGE UP (ref 7–23)
CALCIUM SERPL-MCNC: 8.7 MG/DL — SIGNIFICANT CHANGE UP (ref 8.5–10.1)
CHLORIDE SERPL-SCNC: 105 MMOL/L — SIGNIFICANT CHANGE UP (ref 96–108)
CO2 SERPL-SCNC: 26 MMOL/L — SIGNIFICANT CHANGE UP (ref 22–31)
CREAT SERPL-MCNC: 1.12 MG/DL — SIGNIFICANT CHANGE UP (ref 0.5–1.3)
GLUCOSE SERPL-MCNC: 114 MG/DL — HIGH (ref 70–99)
HCT VFR BLD CALC: 43.1 % — SIGNIFICANT CHANGE UP (ref 39–50)
HGB BLD-MCNC: 13.9 G/DL — SIGNIFICANT CHANGE UP (ref 13–17)
MCHC RBC-ENTMCNC: 30.3 PG — SIGNIFICANT CHANGE UP (ref 27–34)
MCHC RBC-ENTMCNC: 32.3 G/DL — SIGNIFICANT CHANGE UP (ref 32–36)
MCV RBC AUTO: 94.1 FL — SIGNIFICANT CHANGE UP (ref 80–100)
NRBC # BLD: 0 /100 WBCS — SIGNIFICANT CHANGE UP (ref 0–0)
PLATELET # BLD AUTO: 229 K/UL — SIGNIFICANT CHANGE UP (ref 150–400)
POTASSIUM SERPL-MCNC: 3.7 MMOL/L — SIGNIFICANT CHANGE UP (ref 3.5–5.3)
POTASSIUM SERPL-SCNC: 3.7 MMOL/L — SIGNIFICANT CHANGE UP (ref 3.5–5.3)
RBC # BLD: 4.58 M/UL — SIGNIFICANT CHANGE UP (ref 4.2–5.8)
RBC # FLD: 14.9 % — HIGH (ref 10.3–14.5)
SODIUM SERPL-SCNC: 139 MMOL/L — SIGNIFICANT CHANGE UP (ref 135–145)
WBC # BLD: 10.32 K/UL — SIGNIFICANT CHANGE UP (ref 3.8–10.5)
WBC # FLD AUTO: 10.32 K/UL — SIGNIFICANT CHANGE UP (ref 3.8–10.5)

## 2022-02-25 RX ORDER — POLYETHYLENE GLYCOL 3350 17 G/17G
17 POWDER, FOR SOLUTION ORAL
Qty: 0 | Refills: 0 | DISCHARGE
Start: 2022-02-25

## 2022-02-25 RX ORDER — OXYCODONE HYDROCHLORIDE 5 MG/1
2 TABLET ORAL
Qty: 50 | Refills: 0
Start: 2022-02-25 | End: 2022-03-01

## 2022-02-25 RX ORDER — ASPIRIN/CALCIUM CARB/MAGNESIUM 324 MG
1 TABLET ORAL
Qty: 60 | Refills: 0
Start: 2022-02-25 | End: 2022-03-26

## 2022-02-25 RX ORDER — SENNA PLUS 8.6 MG/1
2 TABLET ORAL
Qty: 0 | Refills: 0 | DISCHARGE
Start: 2022-02-25

## 2022-02-25 RX ORDER — ACETAMINOPHEN 500 MG
1000 TABLET ORAL ONCE
Refills: 0 | Status: COMPLETED | OUTPATIENT
Start: 2022-02-25 | End: 2022-02-25

## 2022-02-25 RX ADMIN — LIDOCAINE 1 PATCH: 4 CREAM TOPICAL at 08:08

## 2022-02-25 RX ADMIN — Medication 1000 MILLIGRAM(S): at 10:15

## 2022-02-25 RX ADMIN — OXYCODONE HYDROCHLORIDE 5 MILLIGRAM(S): 5 TABLET ORAL at 16:30

## 2022-02-25 RX ADMIN — OXYCODONE HYDROCHLORIDE 10 MILLIGRAM(S): 5 TABLET ORAL at 06:38

## 2022-02-25 RX ADMIN — OXYCODONE HYDROCHLORIDE 5 MILLIGRAM(S): 5 TABLET ORAL at 15:45

## 2022-02-25 RX ADMIN — OXYCODONE HYDROCHLORIDE 10 MILLIGRAM(S): 5 TABLET ORAL at 05:59

## 2022-02-25 RX ADMIN — TRAMADOL HYDROCHLORIDE 50 MILLIGRAM(S): 50 TABLET ORAL at 12:43

## 2022-02-25 RX ADMIN — Medication 81 MILLIGRAM(S): at 05:59

## 2022-02-25 RX ADMIN — AMLODIPINE BESYLATE 5 MILLIGRAM(S): 2.5 TABLET ORAL at 05:59

## 2022-02-25 RX ADMIN — Medication 1 TABLET(S): at 12:44

## 2022-02-25 RX ADMIN — TRAMADOL HYDROCHLORIDE 50 MILLIGRAM(S): 50 TABLET ORAL at 13:40

## 2022-02-25 RX ADMIN — Medication 400 MILLIGRAM(S): at 09:10

## 2022-02-25 RX ADMIN — PANTOPRAZOLE SODIUM 40 MILLIGRAM(S): 20 TABLET, DELAYED RELEASE ORAL at 06:02

## 2022-02-25 RX ADMIN — SODIUM CHLORIDE 125 MILLILITER(S): 9 INJECTION INTRAMUSCULAR; INTRAVENOUS; SUBCUTANEOUS at 06:02

## 2022-02-25 RX ADMIN — Medication 500 MILLIGRAM(S): at 05:59

## 2022-02-25 RX ADMIN — OXYCODONE HYDROCHLORIDE 5 MILLIGRAM(S): 5 TABLET ORAL at 00:30

## 2022-02-25 NOTE — DISCHARGE NOTE NURSING/CASE MANAGEMENT/SOCIAL WORK - NSDCPEFALRISK_GEN_ALL_CORE
For information on Fall & Injury Prevention, visit: [email or web address] OR  [email or web address] OR  https://www.cdc.gov/steadi/patient.html

## 2022-02-25 NOTE — DISCHARGE NOTE NURSING/CASE MANAGEMENT/SOCIAL WORK - PATIENT PORTAL LINK FT
You can access the FollowMyHealth Patient Portal offered by [facility] by registering at the following website: [email or web address]. By joining [facility]’s FollowMyHealth portal, you will also be able to view your health information using other applications (apps) compatible with our system.

## 2022-02-25 NOTE — DISCHARGE NOTE NURSING/CASE MANAGEMENT/SOCIAL WORK - NSDCFUADDAPPT_GEN_ALL_CORE_FT
Follow up with your surgeon in two weeks. Call for appointment.  If you need more pain medication, call your surgeon's office. For medication refills or authorizations, please call [phone]  We recommend that you call and schedule a follow up appointment within 2-4 weeks with your primary care physician for repeat blood work (CBC and BMP) for post hospital discharge follow-up care.  Call your surgeon if you have increased redness/pain/drainage or fever. Return to ER for shortness of breath/calf tenderness.

## 2022-02-27 DIAGNOSIS — G47.30 SLEEP APNEA, UNSPECIFIED: ICD-10-CM

## 2022-02-27 DIAGNOSIS — J44.9 CHRONIC OBSTRUCTIVE PULMONARY DISEASE, UNSPECIFIED: ICD-10-CM

## 2022-02-27 DIAGNOSIS — E78.00 PURE HYPERCHOLESTEROLEMIA, UNSPECIFIED: ICD-10-CM

## 2022-02-27 DIAGNOSIS — I10 ESSENTIAL (PRIMARY) HYPERTENSION: ICD-10-CM

## 2022-02-27 DIAGNOSIS — M17.12 UNILATERAL PRIMARY OSTEOARTHRITIS, LEFT KNEE: ICD-10-CM

## 2022-02-27 DIAGNOSIS — K21.9 GASTRO-ESOPHAGEAL REFLUX DISEASE WITHOUT ESOPHAGITIS: ICD-10-CM

## 2022-02-27 DIAGNOSIS — N17.9 ACUTE KIDNEY FAILURE, UNSPECIFIED: ICD-10-CM

## 2022-02-28 ENCOUNTER — NON-APPOINTMENT (OUTPATIENT)
Age: 77
End: 2022-02-28

## 2022-02-28 LAB — SURGICAL PATHOLOGY STUDY: SIGNIFICANT CHANGE UP

## 2022-03-01 ENCOUNTER — APPOINTMENT (OUTPATIENT)
Dept: FAMILY MEDICINE | Facility: CLINIC | Age: 77
End: 2022-03-01
Payer: MEDICARE

## 2022-03-01 DIAGNOSIS — R06.6 HICCOUGH: ICD-10-CM

## 2022-03-01 PROCEDURE — 99213 OFFICE O/P EST LOW 20 MIN: CPT | Mod: 95

## 2022-03-14 ENCOUNTER — APPOINTMENT (OUTPATIENT)
Dept: FAMILY MEDICINE | Facility: CLINIC | Age: 77
End: 2022-03-14
Payer: MEDICARE

## 2022-03-14 VITALS
BODY MASS INDEX: 26.49 KG/M2 | HEART RATE: 77 BPM | OXYGEN SATURATION: 97 % | DIASTOLIC BLOOD PRESSURE: 82 MMHG | WEIGHT: 159 LBS | TEMPERATURE: 97.8 F | SYSTOLIC BLOOD PRESSURE: 128 MMHG | HEIGHT: 65 IN

## 2022-03-14 DIAGNOSIS — Z01.818 ENCOUNTER FOR OTHER PREPROCEDURAL EXAMINATION: ICD-10-CM

## 2022-03-14 PROCEDURE — 99215 OFFICE O/P EST HI 40 MIN: CPT

## 2022-03-14 RX ORDER — HYDROCODONE BITARTRATE AND ACETAMINOPHEN 5; 325 MG/1; MG/1
5-325 TABLET ORAL
Qty: 40 | Refills: 0 | Status: DISCONTINUED | COMMUNITY
Start: 2021-01-07 | End: 2022-03-14

## 2022-03-14 RX ORDER — MELOXICAM 7.5 MG/1
7.5 TABLET ORAL
Qty: 30 | Refills: 0 | Status: COMPLETED | COMMUNITY
Start: 2021-10-28

## 2022-03-14 NOTE — HISTORY OF PRESENT ILLNESS
[FreeTextEntry1] : s/p LTKR, still having pain, saw ortho last wk, difficulty doing PT, swollen, but better than last wk; taking percocet prn, celebrex and tylenol; hx of RTKR\par c/o leg cramps w/ atorvastatin, tried coq10, feels too expensive, did not try pravastatin, likes to eat eggs\par c/o increased urinary freq at night, states tamsulosin did not help, has not followed w/ uro yet\par

## 2022-03-14 NOTE — PHYSICAL EXAM
[No Acute Distress] : no acute distress [Well Nourished] : well nourished [Normal Sclera/Conjunctiva] : normal sclera/conjunctiva [EOMI] : extraocular movements intact [Normal Outer Ear/Nose] : the outer ears and nose were normal in appearance [No JVD] : no jugular venous distention [Normal] : normal rate, regular rhythm, normal S1 and S2 and no murmur heard [Coordination Grossly Intact] : coordination grossly intact [Normal Affect] : the affect was normal [Alert and Oriented x3] : oriented to person, place, and time [Normal Insight/Judgement] : insight and judgment were intact [de-identified] : left knee incision w/ erythema, swelling, no discharge, tender w/ flexion

## 2022-03-14 NOTE — PLAN
[FreeTextEntry1] : can cont celebrex, percocet (pt weaning off), and acetaminophen prn\par uro f/u, try hytrin\par low fat diet, reduce egg yolks; try pravastatin

## 2022-03-15 NOTE — PATIENT PROFILE ADULT - CENTRAL VENOUS CATHETER/PICC LINE
Called patient's daughter Bonitajerica Malonein (MANUEL), verified patient's name and , asked daughter to clarify what dose of Spironolactone dosage patient is actually taking. Patient is taking TWO 25mg tablets daily.
Dr. Norma Lawrence: please clarify message. Which medication were you referring to?
Pharmacy called in clarification of the prescription. I have patients take his Spirinolactone once a day as well as EndoShears can we clarify with patient.   Please update med list.
Spironolactone either 25 or 50 once a day? Sorry that comes as 25x2 is at the pharmacy is questioning orders at 50 mg once a day. When I talk with her she told me it was 25 which is 1 to clarify.
no

## 2022-03-22 ENCOUNTER — APPOINTMENT (OUTPATIENT)
Dept: UROLOGY | Facility: CLINIC | Age: 77
End: 2022-03-22
Payer: MEDICARE

## 2022-03-22 VITALS
TEMPERATURE: 98.7 F | WEIGHT: 156 LBS | OXYGEN SATURATION: 96 % | HEIGHT: 65 IN | SYSTOLIC BLOOD PRESSURE: 166 MMHG | HEART RATE: 73 BPM | BODY MASS INDEX: 25.99 KG/M2 | DIASTOLIC BLOOD PRESSURE: 85 MMHG

## 2022-03-22 PROCEDURE — 51741 ELECTRO-UROFLOWMETRY FIRST: CPT

## 2022-03-22 PROCEDURE — 99204 OFFICE O/P NEW MOD 45 MIN: CPT

## 2022-03-22 PROCEDURE — 51798 US URINE CAPACITY MEASURE: CPT

## 2022-03-31 NOTE — REVIEW OF SYSTEMS
[Eyesight Problems] : eyesight problems [Dry Eyes] : dryness of the eyes [Wake up at night to urinate  How many times?  ___] : wakes up to urinate [unfilled] times during the night [Strain or push to urinate] : strain or push to urinate [Joint Pain] : joint pain [Joint Swelling] : joint swelling [Limb Swelling] : limb swelling [Limb Weakness] : limb weakness [Difficulty Walking] : difficulty walking [Muscle Weakness] : muscle weakness [Feelings Of Weakness] : feelings of weakness [Easy Bleeding] : a tendency for easy bleeding [Easy Bruising] : a tendency for easy bruising [Negative] : Psychiatric [see HPI] : see HPI [FreeTextEntry3] : leak urine

## 2022-03-31 NOTE — ASSESSMENT
[FreeTextEntry1] : Moderate symptoms of LUTS-on very low dose of hytrin-consider increasing or changing for different alpha blocker\par \par RE: heaturia-likely benign but will undergo CT then cysto

## 2022-03-31 NOTE — HISTORY OF PRESENT ILLNESS
[FreeTextEntry1] : 76 yo with LUTS\par \par IPSS 7\par \par main issue is nocturia x 4 or more and freq\par last two  nights nocturia x1 only\par \par no dysuria \par \par PSA 8/22-2.75\par \par had one episode of gross hematuria after TKR in 12/21-did not have menon during procedure\par \par

## 2022-03-31 NOTE — LETTER BODY
[Dear  ___] : Dear  [unfilled], [Courtesy Letter:] : I had the pleasure of seeing your patient, [unfilled], in my office today. [Please see my note below.] : Please see my note below. [Consult Closing:] : Thank you very much for allowing me to participate in the care of this patient.  If you have any questions, please do not hesitate to contact me. [Sincerely,] : Sincerely, [FreeTextEntry1] : see below\par \par will need CT and cysto-will give po atb for cysto if ok with you and sufficient [FreeTextEntry3] : Meliton Cruz MD FACS\par \par Attending Urologist-[facility]\par Chief-Urology Division [facility]\par Associate Clinical Professor-[employer] at [facility]\par \par

## 2022-04-04 ENCOUNTER — APPOINTMENT (OUTPATIENT)
Dept: CT IMAGING | Facility: CLINIC | Age: 77
End: 2022-04-04
Payer: MEDICARE

## 2022-04-04 ENCOUNTER — OUTPATIENT (OUTPATIENT)
Dept: OUTPATIENT SERVICES | Facility: HOSPITAL | Age: 77
LOS: 1 days | End: 2022-04-04
Payer: MEDICARE

## 2022-04-04 DIAGNOSIS — Z98.890 OTHER SPECIFIED POSTPROCEDURAL STATES: Chronic | ICD-10-CM

## 2022-04-04 DIAGNOSIS — Z96.651 PRESENCE OF RIGHT ARTIFICIAL KNEE JOINT: Chronic | ICD-10-CM

## 2022-04-04 DIAGNOSIS — R31.9 HEMATURIA, UNSPECIFIED: ICD-10-CM

## 2022-04-04 DIAGNOSIS — Z98.49 CATARACT EXTRACTION STATUS, UNSPECIFIED EYE: Chronic | ICD-10-CM

## 2022-04-04 DIAGNOSIS — Z98.1 ARTHRODESIS STATUS: Chronic | ICD-10-CM

## 2022-04-04 PROCEDURE — 74178 CT ABD&PLV WO CNTR FLWD CNTR: CPT | Mod: 26,ME

## 2022-04-04 PROCEDURE — G1004: CPT

## 2022-04-04 PROCEDURE — 74178 CT ABD&PLV WO CNTR FLWD CNTR: CPT | Mod: ME

## 2022-04-07 LAB
APPEARANCE: CLEAR
BACTERIA UR CULT: NORMAL
BACTERIA: NEGATIVE
BILIRUBIN URINE: NEGATIVE
BLOOD URINE: NEGATIVE
COLOR: YELLOW
GLUCOSE QUALITATIVE U: NEGATIVE
HYALINE CASTS: 1 /LPF
KETONES URINE: NEGATIVE
LEUKOCYTE ESTERASE URINE: NEGATIVE
MICROSCOPIC-UA: NORMAL
NITRITE URINE: NEGATIVE
PH URINE: 6
PROTEIN URINE: ABNORMAL
RED BLOOD CELLS URINE: 1 /HPF
SPECIFIC GRAVITY URINE: 1.03
SQUAMOUS EPITHELIAL CELLS: 0 /HPF
URINE CYTOLOGY: NORMAL
UROBILINOGEN URINE: NORMAL
WHITE BLOOD CELLS URINE: 1 /HPF

## 2022-04-08 ENCOUNTER — APPOINTMENT (OUTPATIENT)
Dept: ORTHOPEDIC SURGERY | Facility: CLINIC | Age: 77
End: 2022-04-08
Payer: MEDICARE

## 2022-04-08 VITALS — WEIGHT: 137 LBS | HEIGHT: 64 IN | BODY MASS INDEX: 23.39 KG/M2

## 2022-04-08 PROCEDURE — 99213 OFFICE O/P EST LOW 20 MIN: CPT

## 2022-04-08 PROCEDURE — 99024 POSTOP FOLLOW-UP VISIT: CPT

## 2022-04-08 PROCEDURE — 73562 X-RAY EXAM OF KNEE 3: CPT | Mod: RT

## 2022-04-11 ENCOUNTER — APPOINTMENT (OUTPATIENT)
Dept: UROLOGY | Facility: CLINIC | Age: 77
End: 2022-04-11
Payer: MEDICARE

## 2022-04-11 ENCOUNTER — APPOINTMENT (OUTPATIENT)
Dept: SURGERY | Facility: CLINIC | Age: 77
End: 2022-04-11
Payer: MEDICARE

## 2022-04-11 ENCOUNTER — RESULT CHARGE (OUTPATIENT)
Age: 77
End: 2022-04-11

## 2022-04-11 VITALS
HEIGHT: 66 IN | WEIGHT: 156 LBS | OXYGEN SATURATION: 97 % | HEART RATE: 83 BPM | BODY MASS INDEX: 25.07 KG/M2 | SYSTOLIC BLOOD PRESSURE: 144 MMHG | TEMPERATURE: 98.2 F | DIASTOLIC BLOOD PRESSURE: 77 MMHG

## 2022-04-11 PROCEDURE — 81003 URINALYSIS AUTO W/O SCOPE: CPT | Mod: QW

## 2022-04-11 PROCEDURE — 52000 CYSTOURETHROSCOPY: CPT

## 2022-04-11 PROCEDURE — 99203 OFFICE O/P NEW LOW 30 MIN: CPT

## 2022-04-11 RX ORDER — SULFAMETHOXAZOLE AND TRIMETHOPRIM 800; 160 MG/1; MG/1
800-160 TABLET ORAL
Refills: 0 | Status: COMPLETED | OUTPATIENT
Start: 2022-04-11

## 2022-04-11 RX ORDER — SULFAMETHOXAZOLE AND TRIMETHOPRIM 800; 160 MG/1; MG/1
800-160 TABLET ORAL
Qty: 2 | Refills: 0 | Status: COMPLETED | COMMUNITY
Start: 2022-04-10 | End: 2022-04-11

## 2022-04-11 RX ADMIN — SULFAMETHOXAZOLE AND TRIMETHOPRIM 0 MG: 800; 160 TABLET ORAL at 00:00

## 2022-04-11 NOTE — PLAN
[FreeTextEntry1] : 77 year old man with CT findings of a dilated appendix. Signs/symptoms not consistent with appendicitis. \par - no emergent general surgery intervention required at this time\par - discussed with the patient and family at bedside at length regarding the signs/symptoms of appendicitis. All questions answered to their satisfaction. They are aware that if he develops signs/symptoms that he may need emergent intervention. \par - discussed with patient regarding the signs/symptoms of incarceration, obstruction and strangulation of inguinal hernias. Patient aware that if he develops the above signs/symptoms he may need emergent interventions.

## 2022-04-11 NOTE — PHYSICAL EXAM
[No Rash or Lesion] : No rash or lesion [Alert] : alert [Oriented to Person] : oriented to person [Oriented to Place] : oriented to place [Calm] : calm [de-identified] : Awake, alert and oriented [de-identified] : Breathing comfortably on room air [de-identified] : Abd is soft, not tender and not distended\par No rebound, no guarding\par Right inguinal hernia

## 2022-04-11 NOTE — HISTORY OF PRESENT ILLNESS
[de-identified] : 77 year old man s/p B/L knee replacements, had recent CT scan concerning for acute appendicitis.\par Patient denies any nausea, vomiting, fever or chills.\par States he has never had any abdominal pain. \par States he has a good appetite, and is having normal GI function.\par State she had a colonoscopy 2 years ago which was normal. \par

## 2022-04-12 NOTE — PHYSICAL EXAM
[Left] : left knee [4___] : hamstring 4[unfilled]/5 [Right] : right knee [NL (0)] : extension 0 degrees [Bilateral] : knee bilaterally [All Views] : anteroposterior, lateral, skyline, and anteroposterior standing [Components well fixed, in good position] : Components well fixed, in good position [de-identified] : Right Knee: Inspection of the knee is as follows: no effusion, erythema, ecchymosis, scars or deformities. Palpation\par of the knee is as follows: lateral joint line tenderness, lateral facet of patella tenderness, patellar compression\par tenderness and crepitus about the patella. Knee Range of Motion is as follows: Extension: 0 degrees. Flexion:\par 120 degrees. Strength examination of the knee is as follows: Quadriceps strength is 4/5 Hamstring strength is\par 4/5 Ligament Stability and Special Test ligamentously stable. Neurological examination of the knee is as follows: light\par touch is intact throughout. Gait and function is as follows: ambulation with cane. \par Left Knee: Inspection of the knee is as follows: no effusion, erythema, ecchymosis, scars or deformities. Palpation of\par the knee is as follows: medial joint line tenderness, lateral joint line tenderness, medial facet of patella\par tenderness, lateral facet of patella tenderness, patellar compression tenderness and crepitus about the\par patella. Knee Range of Motion is as follows: Extension: 0 degrees. Flexion: 130 degrees. Strength examination\par of the knee is as follows: Quadriceps strength is 4/5 Hamstring strength is 4/5 Ligament Stability and Special\par Test ligamentously stable. Neurological examination of the knee is as follows: light touch is intact throughout. [] : no sign of infection [TWNoteComboBox7] : flexion 125 degrees

## 2022-04-12 NOTE — HISTORY OF PRESENT ILLNESS
[5] : 5 [de-identified] : patient here to follow up on bilateral knees\par S/P LEFT TKA 2/23/22, RIGHT 12/1/21 [] : no [de-identified] : sitting to standing [de-identified] : mary

## 2022-04-12 NOTE — ASSESSMENT
[FreeTextEntry1] : S/P RT PARTIAL KNEE REPLACEMENT BY DR. ORDONEZ 01.2021\par S/P RIGHT TKA REVISION 12/01/2021: DOING WELL. NO F/C/S. XRAYS REVEAL COMPONENTS ARE WELL FIXED, IN GOOD\par POSITION. GOOD ROM. NO SIGNS OF INFECTION. GOOD LIGAMENT BALANCE. PRECAUTIONS AND ABX DISCUSSED. \par S/P LT TKA 2/23/22: NO F/C/S. DOING WELL. XRAYS REVIEWED WITH COMPONENTS WELL FIXED. NO SIGNS OF\par INFECTION. GOOD LIGAMENT BALANCE ON EXAM. DISCUSSED THE IMPORTANCE OF ELEVATION TO REDUCE SWELLING.\par ABX AND PRECAUTIONS REVIEWED. PT RX. QUESTIONS ANSWERED. PT RX.

## 2022-05-06 ENCOUNTER — APPOINTMENT (OUTPATIENT)
Dept: ORTHOPEDIC SURGERY | Facility: CLINIC | Age: 77
End: 2022-05-06
Payer: MEDICARE

## 2022-05-06 VITALS — WEIGHT: 156 LBS | BODY MASS INDEX: 25.07 KG/M2 | HEIGHT: 66 IN

## 2022-05-06 PROCEDURE — 99214 OFFICE O/P EST MOD 30 MIN: CPT

## 2022-05-06 PROCEDURE — 73562 X-RAY EXAM OF KNEE 3: CPT | Mod: RT

## 2022-05-06 PROCEDURE — 72100 X-RAY EXAM L-S SPINE 2/3 VWS: CPT

## 2022-05-06 NOTE — IMAGING
[Implants in position] : Implants in position [AP] : anteroposterior [Lateral] : lateral [Broughton] : skyline [Components well fixed, in good position] : Components well fixed, in good position

## 2022-05-06 NOTE — PHYSICAL EXAM
[Left] : left knee [4___] : hamstring 4[unfilled]/5 [Right] : right knee [NL (0)] : extension 0 degrees [FreeTextEntry9] : N/T Bilateral legs [] : no sign of infection [TWNoteComboBox7] : flexion 125 degrees

## 2022-05-06 NOTE — HISTORY OF PRESENT ILLNESS
[7] : 7 [Radiating] : radiating [Sharp] : sharp [Shooting] : shooting [Constant] : constant [Standing] : standing [Walking] : walking [Stairs] : stairs [Retired] : Work status: retired [de-identified] : B knee and low back pain x 4 weeks. No injury or falls. Pain over lateral thigh and into his knee. Pain extends to his calf. Headmits to N/T, and sometimes into his feet. Has tried advil/tylenol. Using a cane. \par \par HX L TKA 2/2022 by Dr. Daugherty, recovering well. \par Hx lumbar fusion [] : Post Surgical Visit: no [FreeTextEntry1] : anastasia knees/ low back [FreeTextEntry3] : 4 weeks ago [FreeTextEntry5] : patient is here with knee/back since 4 weeks ago\par complains of shooting pain down the leg into the left knee cap

## 2022-05-06 NOTE — ASSESSMENT
[FreeTextEntry1] : Likely coming from his back\par Knee exam stable, XR unchanged\par MDP sent in\par FU with Dr. Coon

## 2022-05-13 ENCOUNTER — APPOINTMENT (OUTPATIENT)
Dept: PAIN MANAGEMENT | Facility: CLINIC | Age: 77
End: 2022-05-13
Payer: MEDICARE

## 2022-05-13 VITALS — WEIGHT: 155 LBS | BODY MASS INDEX: 24.91 KG/M2 | HEIGHT: 66 IN

## 2022-05-13 PROCEDURE — J3490M: CUSTOM

## 2022-05-13 PROCEDURE — 99214 OFFICE O/P EST MOD 30 MIN: CPT | Mod: 25

## 2022-05-13 PROCEDURE — 20552 NJX 1/MLT TRIGGER POINT 1/2: CPT

## 2022-05-13 NOTE — PHYSICAL EXAM
[de-identified] : Constitutional; Appears well, no apparent distress\par Ability to communicate: Normal \par Respiratory: non-labored breathing\par Skin: No rash noted\par Head: Normocephalic, atraumatic\par Neck: no visible thyroid enlargement\par Eyes: Extraocular movements intact\par Neurologic: Alert and oriented x3\par Psychiatric: normal mood, affect and behavior \par \par  [Flexion] : flexion [] : non-antalgic

## 2022-05-13 NOTE — HISTORY OF PRESENT ILLNESS
[Lower back] : lower back [6] : 6 [1] : 2 [Dull/Aching] : dull/aching [Radiating] : radiating [Sharp] : sharp [Stabbing] : stabbing [Tingling] : tingling [Constant] : constant [Meds] : meds [Injection therapy] : injection therapy [] : no [FreeTextEntry1] : right  [FreeTextEntry6] : numbness [FreeTextEntry7] : right leg to b/l foot  [de-identified] : L MRI

## 2022-05-13 NOTE — DISCUSSION/SUMMARY
[de-identified] : After discussing various treatment options with the patient including but not limited to oral medications, physical therapy, exercise modalities as well as interventional spinal injections, we have decided with the following plan:\par \par - Continue Home exercises, stretching, activity modification, physical therapy, and conservative care.\par - Recommend L2-3 Lumbar Epidural Steroid Injection under fluoroscopic guidance with image.\par - The risks, benefits and alternatives of the proposed procedure were explained in detail with the patient. The risks outlined include but are not limited to infection, bleeding, post-dural puncture headache, nerve injury, a temporary increase in pain, failure to resolve symptoms, allergic reaction, symptom recurrence, and possible elevation of blood sugar in diabetics. All questions were answered to patient's apparent satisfaction and he/she verbalized an understanding.\par - Patient is presenting with acute/sub-acute radicular pain with impairment in ADLs and functionality.  The pain has not responded to conservative care including NSAID therapy and/or physical therapy. There is no bleeding tendency, unstable medical condition, or systemic infection.\par - Follow up in 1-2 weeks post injection for re-evaluation.\par

## 2022-05-13 NOTE — PROCEDURE
[FreeTextEntry3] : Procedure Name: Trigger Point Injection (1-2 muscle groups): Celestone and Marcaine\par Trigger Point was performed because of pain. \par Celestone: An injection of Celestone 6 mg\par Marcaine: An injection of Marcaine 10 mg\par Medication was injected in the Right Lumbar Paraspinal muscle. \par \par Patient has tried OTC's including aspirin, Ibuprofen, Aleve etc or prescription NSAIDS, and/or exercises at home and/ or physical therapy without satisfactory response. After verbal consent using sterile preparation and technique.The risks, benefits, and alternatives to cortisone injection were explained in full to the patient. Risks outlined include but are not limited to infection, sepsis, bleeding, scarring, skin discoloration, temporary increase in pain, syncopal episode, failure to resolve symptoms, allergic reaction, symptom recurrence, and elevation of blood sugar in diabetics. Patient understood the risks. All questions were answered. After discussion of options, patient requested an injection. Oral informed consent was obtained and sterile prep was done of the injection site. Sterile technique was utilized for the procedure including the preparation of the solutions used for the injection. Patient tolerated the procedure well. Advised to ice the injection site this evening. Prep with alcohol locally to site. Sterile technique used.\par

## 2022-06-07 LAB — SARS-COV-2 N GENE NPH QL NAA+PROBE: NOT DETECTED

## 2022-06-08 ENCOUNTER — APPOINTMENT (OUTPATIENT)
Dept: ORTHOPEDIC SURGERY | Facility: AMBULATORY SURGERY CENTER | Age: 77
End: 2022-06-08
Payer: MEDICARE

## 2022-06-08 PROCEDURE — 62323 NJX INTERLAMINAR LMBR/SAC: CPT

## 2022-06-09 ENCOUNTER — RX RENEWAL (OUTPATIENT)
Age: 77
End: 2022-06-09

## 2022-06-24 ENCOUNTER — APPOINTMENT (OUTPATIENT)
Dept: PAIN MANAGEMENT | Facility: CLINIC | Age: 77
End: 2022-06-24
Payer: MEDICARE

## 2022-06-24 VITALS — HEIGHT: 66 IN | WEIGHT: 150 LBS | BODY MASS INDEX: 24.11 KG/M2

## 2022-06-24 PROCEDURE — 99214 OFFICE O/P EST MOD 30 MIN: CPT

## 2022-06-24 NOTE — PHYSICAL EXAM
[Flexion] : flexion [de-identified] : Constitutional; Appears well, no apparent distress\par Ability to communicate: Normal \par Respiratory: non-labored breathing\par Skin: No rash noted\par Head: Normocephalic, atraumatic\par Neck: no visible thyroid enlargement\par Eyes: Extraocular movements intact\par Neurologic: Alert and oriented x3\par Psychiatric: normal mood, affect and behavior \par \par  [] : non-antalgic

## 2022-06-24 NOTE — HISTORY OF PRESENT ILLNESS
[Lower back] : lower back [6] : 6 [1] : 2 [Dull/Aching] : dull/aching [Radiating] : radiating [Sharp] : sharp [Stabbing] : stabbing [Tingling] : tingling [Constant] : constant [Meds] : meds [Injection therapy] : injection therapy [Walking] : walking [] : no [FreeTextEntry1] : right  [FreeTextEntry6] : numbness [FreeTextEntry7] : right leg to b/l foot  [de-identified] : L MRI

## 2022-06-24 NOTE — DISCUSSION/SUMMARY
[de-identified] : After discussing various treatment options with the patient including but not limited to oral medications, physical therapy, exercise modalities as well as interventional spinal injections, we have decided with the following plan:\par \par - Continue Home exercises, stretching, activity modification, physical therapy, and conservative care.\par - MRI report and/or images was reviewed and discussed with the patient.\par - Recommend L2-3 Lumbar Epidural Steroid Injection under fluoroscopic guidance with image. (r>l)\par - The risks, benefits and alternatives of the proposed procedure were explained in detail with the patient. The risks outlined include but are not limited to infection, bleeding, post-dural puncture headache, nerve injury, a temporary increase in pain, failure to resolve symptoms, allergic reaction, symptom recurrence, and possible elevation of blood sugar in diabetics. All questions were answered to patient's apparent satisfaction and he/she verbalized an understanding.\par - Patient is presenting with acute/sub-acute radicular pain with impairment in ADLs and functionality.  The pain has not responded to conservative care including NSAID therapy and/or physical therapy. There is no bleeding tendency, unstable medical condition, or systemic infection.\par - Follow up in 1-2 weeks post injection for re-evaluation.\par

## 2022-07-01 ENCOUNTER — APPOINTMENT (OUTPATIENT)
Dept: FAMILY MEDICINE | Facility: CLINIC | Age: 77
End: 2022-07-01

## 2022-07-01 VITALS
SYSTOLIC BLOOD PRESSURE: 130 MMHG | WEIGHT: 150 LBS | OXYGEN SATURATION: 96 % | HEIGHT: 66 IN | HEART RATE: 74 BPM | BODY MASS INDEX: 24.11 KG/M2 | TEMPERATURE: 98.5 F | DIASTOLIC BLOOD PRESSURE: 82 MMHG

## 2022-07-01 DIAGNOSIS — M25.561 PAIN IN RIGHT KNEE: ICD-10-CM

## 2022-07-01 DIAGNOSIS — Z09 ENCOUNTER FOR FOLLOW-UP EXAMINATION AFTER COMPLETED TREATMENT FOR CONDITIONS OTHER THAN MALIGNANT NEOPLASM: ICD-10-CM

## 2022-07-01 DIAGNOSIS — M25.562 PAIN IN LEFT KNEE: ICD-10-CM

## 2022-07-01 DIAGNOSIS — J30.9 ALLERGIC RHINITIS, UNSPECIFIED: ICD-10-CM

## 2022-07-01 DIAGNOSIS — R09.81 NASAL CONGESTION: ICD-10-CM

## 2022-07-01 PROCEDURE — 99215 OFFICE O/P EST HI 40 MIN: CPT

## 2022-07-01 RX ORDER — TRIAMCINOLONE ACETONIDE 1 MG/G
0.1 OINTMENT TOPICAL
Qty: 60 | Refills: 0 | Status: COMPLETED | COMMUNITY
Start: 2022-01-24

## 2022-07-01 RX ORDER — DICLOFENAC SODIUM 50 MG/1
50 TABLET, DELAYED RELEASE ORAL
Qty: 60 | Refills: 0 | Status: ACTIVE | COMMUNITY
Start: 2022-07-01 | End: 1900-01-01

## 2022-07-01 RX ORDER — PRAVASTATIN SODIUM 10 MG/1
10 TABLET ORAL
Qty: 90 | Refills: 1 | Status: DISCONTINUED | COMMUNITY
Start: 2021-12-09 | End: 2022-07-01

## 2022-07-01 RX ORDER — ALBUTEROL SULFATE 90 UG/1
108 (90 BASE) INHALANT RESPIRATORY (INHALATION)
Qty: 8 | Refills: 0 | Status: COMPLETED | COMMUNITY
Start: 2022-05-10

## 2022-07-01 NOTE — PLAN
[FreeTextEntry1] : diclofenac w/ omeprazole, prednisone, montelukast\par rtc for cpe\par cont astelin\par advised risks of high chol, particularly due to carotid atherosclerosis\par reviewed low fat diet

## 2022-07-01 NOTE — PHYSICAL EXAM
[No Acute Distress] : no acute distress [Well Nourished] : well nourished [Normal Sclera/Conjunctiva] : normal sclera/conjunctiva [EOMI] : extraocular movements intact [Normal Outer Ear/Nose] : the outer ears and nose were normal in appearance [Normal Oropharynx] : the oropharynx was normal [Normal TMs] : both tympanic membranes were normal [No JVD] : no jugular venous distention [Normal] : normal rate, regular rhythm, normal S1 and S2 and no murmur heard [Coordination Grossly Intact] : coordination grossly intact [Normal Affect] : the affect was normal [Alert and Oriented x3] : oriented to person, place, and time [Normal Insight/Judgement] : insight and judgment were intact [No Focal Deficits] : no focal deficits [de-identified] : mildly injected nasal turbinates; mucous noted [de-identified] : b/l knee tenderness L>Rt, left knee swollen; b/l knees w/ vertical incisional scars; limited rom to left; gait slow and steady

## 2022-07-01 NOTE — HISTORY OF PRESENT ILLNESS
[FreeTextEntry1] : c/o sinus pressure x 2 wks, uses astelin which helps some, hx of nose bleeds\par states will not take statin due to leg cramps despite coq10, hx of carotid endardectomy\par c/o chronic knee pain left >rt, doint PT, also has LBP

## 2022-07-08 ENCOUNTER — APPOINTMENT (OUTPATIENT)
Dept: ORTHOPEDIC SURGERY | Facility: CLINIC | Age: 77
End: 2022-07-08

## 2022-07-08 VITALS — WEIGHT: 152 LBS | HEIGHT: 66 IN | BODY MASS INDEX: 24.43 KG/M2

## 2022-07-08 PROCEDURE — 73562 X-RAY EXAM OF KNEE 3: CPT | Mod: 50

## 2022-07-08 PROCEDURE — 99214 OFFICE O/P EST MOD 30 MIN: CPT

## 2022-07-08 NOTE — ASSESSMENT
[FreeTextEntry1] : S/P RT PARTIAL KNEE REPLACEMENT BY DR. ORDONEZ 1/2021\par S/P RIGHT TKA REVISION 12/01/2021: DOING WELL. HE IS CURRENTLY IN PT. IS HAVING SOME PAIN AND INSTABILITY. NO F/C/S. XRAYS REVIEWED WITH COMPONENTS WELL FIXED, IN GOOD POSITION. GOOD ROM. NO SIGNS OF INFECTION. GOOD LIGAMENT BALANCE. PRECAUTIONS AND ABX DISCUSSED.\par \par S/P LT TKA 2/23/22: NO F/C/S. DOING WELL. XRAYS REVIEWED WITH COMPONENTS WELL FIXED. NO SIGNS OF\par INFECTION. GOOD LIGAMENT BALANCE ON EXAM. DISCUSSED THE IMPORTANCE OF ELEVATION TO REDUCE SWELLING. ABX AND PRECAUTIONS REVIEWED. QUESTIONS ANSWERED. \par \par MRI WITH MARS RT KNEE INDICATED DUE TO INCREASED PAIN. HE WILL HOLD PT UNTIL AFTER THE MRI.

## 2022-07-08 NOTE — HISTORY OF PRESENT ILLNESS
[Result of repetitive motion] : result of repetitive motion [6] : 6 [Dull/Aching] : dull/aching [Constant] : constant

## 2022-07-08 NOTE — PHYSICAL EXAM
[Left] : left knee [4___] : hamstring 4[unfilled]/5 [Right] : right knee [NL (0)] : extension 0 degrees [de-identified] : Right Knee: Inspection of the knee is as follows: no effusion, erythema, ecchymosis, scars or deformities. Palpation\par of the knee is as follows: lateral joint line tenderness, lateral facet of patella tenderness, patellar compression\par tenderness and crepitus about the patella. Knee Range of Motion is as follows: Extension: 0 degrees. Flexion:\par 120 degrees. Strength examination of the knee is as follows: Quadriceps strength is 4/5 Hamstring strength is\par 4/5 Ligament Stability and Special Test ligamentously stable. Neurological examination of the knee is as follows: light\par touch is intact throughout. \par \par Left Knee: Inspection of the knee is as follows: no effusion, erythema, ecchymosis, scars or deformities. Palpation of\par the knee is as follows: medial joint line tenderness, lateral joint line tenderness, medial facet of patella\par tenderness, lateral facet of patella tenderness, patellar compression tenderness and crepitus about the\par patella. Knee Range of Motion is as follows: Extension: 0 degrees. Flexion: 130 degrees. Strength examination\par of the knee is as follows: Quadriceps strength is 4/5 Hamstring strength is 4/5 Ligament Stability and Special\par Test ligamentously stable. [] : no erythema [TWNoteComboBox7] : flexion 125 degrees

## 2022-07-09 ENCOUNTER — FORM ENCOUNTER (OUTPATIENT)
Age: 77
End: 2022-07-09

## 2022-07-10 ENCOUNTER — APPOINTMENT (OUTPATIENT)
Dept: MRI IMAGING | Facility: CLINIC | Age: 77
End: 2022-07-10

## 2022-07-10 PROCEDURE — 73721 MRI JNT OF LWR EXTRE W/O DYE: CPT | Mod: RT,MH

## 2022-07-11 ENCOUNTER — APPOINTMENT (OUTPATIENT)
Dept: UROLOGY | Facility: CLINIC | Age: 77
End: 2022-07-11

## 2022-07-11 VITALS
TEMPERATURE: 97.3 F | HEART RATE: 60 BPM | OXYGEN SATURATION: 60 % | SYSTOLIC BLOOD PRESSURE: 140 MMHG | DIASTOLIC BLOOD PRESSURE: 73 MMHG

## 2022-07-11 LAB — PSA SERPL-MCNC: 3.81 NG/ML

## 2022-07-11 PROCEDURE — 99213 OFFICE O/P EST LOW 20 MIN: CPT

## 2022-07-11 PROCEDURE — 81003 URINALYSIS AUTO W/O SCOPE: CPT | Mod: QW

## 2022-07-11 RX ORDER — TERAZOSIN 1 MG/1
1 CAPSULE ORAL DAILY
Qty: 30 | Refills: 0 | Status: DISCONTINUED | COMMUNITY
Start: 2022-03-14 | End: 2022-07-11

## 2022-07-11 NOTE — ASSESSMENT
[FreeTextEntry1] : 77 year old male with LUTS and significant proteinuria \par \par PSA sent today \par \par discussed with pt his medication list in detail \par \par UA dip showed significant protein in urine\par to f/u with Dr. Fink\par \par pending psa, \par f/u 6 months

## 2022-07-11 NOTE — END OF VISIT
[FreeTextEntry3] : Medical record entries made by the scribe today, were at my direction and personally dictated to them by me, Dr. Meliton Cruz on 07/11/2022. I have reviewed the chart and agree that the record accurately reflects my personal performance of the history, physical exam, assessment, and plan.

## 2022-07-11 NOTE — HISTORY OF PRESENT ILLNESS
[FreeTextEntry1] : 77 year old male with h/o LUTS and hematuria here today for follow up \par \par previous c/o nocturia 1-4x and frequency \par c/o large urine output today, otherwise doing okay \par \par IPSS 3/22/22: 7 \par \par had one episode of hematuria in past-\par CT urogram 4/4/22: no suspicious upper tract lesion identified \par cysto 4/11/22 normal \par \par CMP 12/6/21: high glucose- 122; high BUN- 24; high creatinine-1.39

## 2022-07-12 ENCOUNTER — NON-APPOINTMENT (OUTPATIENT)
Age: 77
End: 2022-07-12

## 2022-07-18 LAB — SARS-COV-2 N GENE NPH QL NAA+PROBE: NOT DETECTED

## 2022-07-20 ENCOUNTER — APPOINTMENT (OUTPATIENT)
Dept: PAIN MANAGEMENT | Facility: CLINIC | Age: 77
End: 2022-07-20

## 2022-07-20 PROCEDURE — 62323 NJX INTERLAMINAR LMBR/SAC: CPT

## 2022-07-20 NOTE — PROCEDURE
[FreeTextEntry3] : Date of Service: 07/20/2022 \par \par Account: [id number]\par \par Patient: DHRUV DONOHUE \par \par YOB: 1945\par \par Age: 77 year\par \par \par Surgeon:                                                         Glen Coon D.O.\par \par Pre-Operative Diagnosis:                             Lumbosacral radiculitis (M54.17)\par \par Post-Operative Diagnosis:                           Lumbosacral Radiculitis (M54.17)\par \par Procedure:                                                      Interlaminar lumbar epidural steroid injection (L2-3) under fluoroscopic guidance\par \par Anesthesia:                                                     Local with MAC\par \par \par This procedure was carried out using fluoroscopic guidance.  The risks and benefits of the procedure were discussed extensively with the patient.  The consent of the patient was obtained and the following procedure was performed.\par \par The patient was placed in the prone position.  The lumbar area was prepped and draped in a sterile fashion.  A timeout was performed with all essential staff present and the site and side were verified.  Under AP view with slight cephalad-caudad angulation, the L2-3 interspace was identified and marked.  Using sterile technique, the superficial skin was anesthetized with 1% Lidocaine without epinephrine.  A 20-gauge Tuohy needle was advanced into the epidural space under fluoroscopy using [id number] technique and using loss of resistance at the L2-3 level.  After negative aspiration for heme or CSF, an epidurogram was obtained using 2-3 cc Omnipaque contrast injected under live fluoroscopy, confirming epidural placement of the needle.  Epidurogram showed no evidence of intrathecal or intravascular flow, and good evidence of bilateral epidural flow. \par \par After this, 4 cc of preservative free normal saline plus 12 mg of betamethasone were injected into the epidural space.\par \par The needle was subsequently removed.  Anesthesia personnel were present throughout the procedure.\par \par The patient tolerated the procedure well and was instructed to contact me immediately if there were any problems.\par \par Glen Coon D.O.\par

## 2022-07-26 ENCOUNTER — APPOINTMENT (OUTPATIENT)
Dept: ORTHOPEDIC SURGERY | Facility: CLINIC | Age: 77
End: 2022-07-26

## 2022-07-28 ENCOUNTER — APPOINTMENT (OUTPATIENT)
Dept: ORTHOPEDIC SURGERY | Facility: CLINIC | Age: 77
End: 2022-07-28

## 2022-07-28 VITALS — HEIGHT: 66 IN | WEIGHT: 152 LBS | BODY MASS INDEX: 24.43 KG/M2

## 2022-07-28 PROCEDURE — 99214 OFFICE O/P EST MOD 30 MIN: CPT

## 2022-07-28 NOTE — PHYSICAL EXAM
[Bilateral] : knee bilaterally [] : ambulation with cane [TWNoteComboBox7] : flexion 120 degrees [de-identified] : extension 0 degrees

## 2022-07-28 NOTE — DATA REVIEWED
[FreeTextEntry1] : MRI 7/10/22:\par 1. Interval right total knee arthroplasty which appears anatomically aligned with moderate effusion, capsulitis, infrapatellar fat pad scarring and progression of quadriceps tendinopathy without gross osteolysis, particle disease, loose body or surrounding soft tissue fluid collection.\par 2. 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 Consider bone scan to further evaluate as clinically indicated.

## 2022-07-28 NOTE — ASSESSMENT
[FreeTextEntry1] : S/P RT PARTIAL KNEE REPLACEMENT BY DR. ORDONEZ 1/2021\par S/P RIGHT TKA REVISION 12/01/2021: DOING WELL. HE IS CURRENTLY IN PT. IS HAVING SOME PAIN AND INSTABILITY. NO F/C/S. XRAYS REVIEWED WITH COMPONENTS WELL FIXED, IN GOOD POSITION. GOOD ROM. NO SIGNS OF INFECTION. GOOD LIGAMENT BALANCE. PRECAUTIONS AND ABX DISCUSSED.\par \par S/P LT TKA 2/23/22: NO F/C/S. DOING WELL. XRAYS REVIEWED WITH COMPONENTS WELL FIXED. NO SIGNS OF\par INFECTION. GOOD LIGAMENT BALANCE ON EXAM. DISCUSSED THE IMPORTANCE OF ELEVATION TO REDUCE SWELLING. ABX AND PRECAUTIONS REVIEWED. QUESTIONS ANSWERED. \par \par MRI WITH MARS RT KNEE INDICATED DUE TO INCREASED PAIN. HE WILL HOLD PT UNTIL AFTER THE MRI. \par \par MRI NORMAL.  PAIN IS LATERAL FACET OF RIGHT PATELLA.  TTP LAT FAC.  XRAYS SHOW SLIGHT PATELLA TILT BUT WITHIN ACCEPTABLE RANGE.  BUTTON WITH GOOD COVERAGE OF PATELLA.  NO EVIDENCE FOR INFECTION OR INSTABILITY.  NO INDICATION FOR REVISION.  TOPICAL NSAID.  WILL F/U 6 MONTHS.

## 2022-07-28 NOTE — HISTORY OF PRESENT ILLNESS
[5] : 5 [2] : 2 [Dull/Aching] : dull/aching [Sharp] : sharp [] : yes [Intermittent] : intermittent [Rest] : rest [Ice] : ice [FreeTextEntry7] : down the legs  [FreeTextEntry9] : Tylenol  [de-identified] : motion

## 2022-07-31 ENCOUNTER — FORM ENCOUNTER (OUTPATIENT)
Age: 77
End: 2022-07-31

## 2022-08-08 ENCOUNTER — APPOINTMENT (OUTPATIENT)
Dept: FAMILY MEDICINE | Facility: CLINIC | Age: 77
End: 2022-08-08

## 2022-08-08 VITALS
SYSTOLIC BLOOD PRESSURE: 130 MMHG | HEIGHT: 66 IN | DIASTOLIC BLOOD PRESSURE: 78 MMHG | HEART RATE: 63 BPM | TEMPERATURE: 98.9 F | WEIGHT: 155 LBS | BODY MASS INDEX: 24.91 KG/M2 | OXYGEN SATURATION: 98 %

## 2022-08-08 DIAGNOSIS — J44.9 CHRONIC OBSTRUCTIVE PULMONARY DISEASE, UNSPECIFIED: ICD-10-CM

## 2022-08-08 PROCEDURE — 99214 OFFICE O/P EST MOD 30 MIN: CPT | Mod: 25

## 2022-08-08 PROCEDURE — 36415 COLL VENOUS BLD VENIPUNCTURE: CPT

## 2022-08-08 RX ORDER — UBIDECARENONE 200 MG
200 CAPSULE ORAL
Refills: 0 | Status: DISCONTINUED | COMMUNITY
End: 2022-08-08

## 2022-08-08 RX ORDER — METHYLPREDNISOLONE 4 MG/1
4 TABLET ORAL
Qty: 21 | Refills: 0 | Status: DISCONTINUED | COMMUNITY
Start: 2022-05-06 | End: 2022-08-08

## 2022-08-08 RX ORDER — CELECOXIB 200 MG/1
200 CAPSULE ORAL
Qty: 30 | Refills: 0 | Status: DISCONTINUED | COMMUNITY
Start: 2022-03-05 | End: 2022-08-08

## 2022-08-08 RX ORDER — PREDNISONE 20 MG/1
20 TABLET ORAL TWICE DAILY
Qty: 10 | Refills: 0 | Status: DISCONTINUED | COMMUNITY
Start: 2022-07-01 | End: 2022-08-08

## 2022-08-08 RX ORDER — OXYCODONE 5 MG/1
5 TABLET ORAL
Qty: 42 | Refills: 0 | Status: DISCONTINUED | COMMUNITY
Start: 2022-03-04 | End: 2022-08-08

## 2022-08-08 RX ORDER — LORATADINE 10 MG/1
10 TABLET ORAL
Qty: 90 | Refills: 3 | Status: DISCONTINUED | COMMUNITY
Start: 2022-07-01 | End: 2022-08-08

## 2022-08-08 RX ORDER — DICLOFENAC SODIUM 3 G/100G
3 GEL TOPICAL TWICE DAILY
Qty: 1 | Refills: 0 | Status: DISCONTINUED | COMMUNITY
Start: 2022-07-28 | End: 2022-08-08

## 2022-08-08 RX ORDER — BACLOFEN 10 MG/1
10 TABLET ORAL EVERY 8 HOURS
Qty: 60 | Refills: 0 | Status: DISCONTINUED | COMMUNITY
Start: 2022-03-01 | End: 2022-08-08

## 2022-08-08 RX ORDER — AZELASTINE HYDROCHLORIDE 137 UG/1
137 SPRAY, METERED NASAL
Qty: 1 | Refills: 0 | Status: DISCONTINUED | COMMUNITY
Start: 2022-02-03 | End: 2022-08-08

## 2022-08-09 LAB
ALBUMIN SERPL ELPH-MCNC: 4.7 G/DL
ALP BLD-CCNC: 106 U/L
ALT SERPL-CCNC: <5 U/L
ANION GAP SERPL CALC-SCNC: 16 MMOL/L
AST SERPL-CCNC: 19 U/L
BASOPHILS # BLD AUTO: 0.05 K/UL
BASOPHILS NFR BLD AUTO: 0.7 %
BILIRUB SERPL-MCNC: 0.5 MG/DL
BUN SERPL-MCNC: 21 MG/DL
CALCIUM SERPL-MCNC: 9.8 MG/DL
CHLORIDE SERPL-SCNC: 107 MMOL/L
CHOLEST SERPL-MCNC: 255 MG/DL
CO2 SERPL-SCNC: 22 MMOL/L
CREAT SERPL-MCNC: 1.24 MG/DL
EGFR: 60 ML/MIN/1.73M2
EOSINOPHIL # BLD AUTO: 0.21 K/UL
EOSINOPHIL NFR BLD AUTO: 3 %
ESTIMATED AVERAGE GLUCOSE: 131 MG/DL
GLUCOSE SERPL-MCNC: 96 MG/DL
HBA1C MFR BLD HPLC: 6.2 %
HCT VFR BLD CALC: 48.9 %
HDLC SERPL-MCNC: 41 MG/DL
HGB BLD-MCNC: 15.6 G/DL
IMM GRANULOCYTES NFR BLD AUTO: 0.3 %
LDLC SERPL CALC-MCNC: 181 MG/DL
LYMPHOCYTES # BLD AUTO: 1.26 K/UL
LYMPHOCYTES NFR BLD AUTO: 17.7 %
MAN DIFF?: NORMAL
MCHC RBC-ENTMCNC: 30.8 PG
MCHC RBC-ENTMCNC: 31.9 GM/DL
MCV RBC AUTO: 96.4 FL
MONOCYTES # BLD AUTO: 0.91 K/UL
MONOCYTES NFR BLD AUTO: 12.8 %
NEUTROPHILS # BLD AUTO: 4.66 K/UL
NEUTROPHILS NFR BLD AUTO: 65.5 %
NONHDLC SERPL-MCNC: 214 MG/DL
PLATELET # BLD AUTO: 218 K/UL
POTASSIUM SERPL-SCNC: 5.3 MMOL/L
PROT SERPL-MCNC: 7.1 G/DL
RBC # BLD: 5.07 M/UL
RBC # FLD: 14.9 %
SODIUM SERPL-SCNC: 145 MMOL/L
TRIGL SERPL-MCNC: 162 MG/DL
TSH SERPL-ACNC: 0.51 UIU/ML
WBC # FLD AUTO: 7.11 K/UL

## 2022-08-19 ENCOUNTER — APPOINTMENT (OUTPATIENT)
Dept: PAIN MANAGEMENT | Facility: CLINIC | Age: 77
End: 2022-08-19

## 2022-08-19 VITALS — HEIGHT: 66 IN | BODY MASS INDEX: 24.91 KG/M2 | WEIGHT: 155 LBS

## 2022-08-19 PROCEDURE — 99213 OFFICE O/P EST LOW 20 MIN: CPT

## 2022-08-19 NOTE — PHYSICAL EXAM
[Flexion] : flexion [de-identified] : Constitutional; Appears well, no apparent distress\par Ability to communicate: Normal \par Respiratory: non-labored breathing\par Skin: No rash noted\par Head: Normocephalic, atraumatic\par Neck: no visible thyroid enlargement\par Eyes: Extraocular movements intact\par Neurologic: Alert and oriented x3\par Psychiatric: normal mood, affect and behavior \par \par  [] : non-antalgic

## 2022-08-19 NOTE — DISCUSSION/SUMMARY
[de-identified] : After discussing various treatment options with the patient including but not limited to oral medications, physical therapy, exercise modalities as well as interventional spinal injections, we have decided with the following plan:\par \par - Continue home exercises, stretching, activity modification, physical therapy, and conservative care.\par - Follow-up as needed.\par - Recommend Tylenol 1000mg Q8 hours PRN.\par \par

## 2022-08-19 NOTE — HISTORY OF PRESENT ILLNESS
[6] : 6 [1] : 2 [Dull/Aching] : dull/aching [Radiating] : radiating [Sharp] : sharp [Stabbing] : stabbing [Tingling] : tingling [Meds] : meds [Injection therapy] : injection therapy [Walking] : walking [Intermittent] : intermittent [] : no [FreeTextEntry1] : right hip [FreeTextEntry6] : numbness [FreeTextEntry7] : right leg to b/l foot  [de-identified] : L MRI

## 2022-09-08 ENCOUNTER — APPOINTMENT (OUTPATIENT)
Dept: ORTHOPEDIC SURGERY | Facility: CLINIC | Age: 77
End: 2022-09-08

## 2022-09-08 PROCEDURE — 99213 OFFICE O/P EST LOW 20 MIN: CPT

## 2022-09-08 NOTE — ASSESSMENT
[FreeTextEntry1] : S/P RT PARTIAL KNEE REPLACEMENT BY DR. ORDONEZ 1/2021\par S/P RIGHT TKA REVISION 12/01/2021: DOING WELL. HE IS CURRENTLY IN PT. IS HAVING SOME PAIN AND INSTABILITY. NO F/C/S. XRAYS REVIEWED WITH COMPONENTS WELL FIXED, IN GOOD POSITION. GOOD ROM. NO SIGNS OF INFECTION. GOOD LIGAMENT BALANCE. PRECAUTIONS AND ABX DISCUSSED.\par \par S/P LT TKA 2/23/22: NO F/C/S. DOING WELL. XRAYS REVIEWED WITH COMPONENTS WELL FIXED. NO SIGNS OF\par INFECTION. GOOD LIGAMENT BALANCE ON EXAM. DISCUSSED THE IMPORTANCE OF ELEVATION TO REDUCE SWELLING. ABX AND PRECAUTIONS REVIEWED. QUESTIONS ANSWERED. \par \par MRI WITH MARS RT KNEE INDICATED DUE TO INCREASED PAIN. HE WILL HOLD PT UNTIL AFTER THE MRI. \par \par MRI NORMAL.   \par \par THERE IS NO EVIDENCE OF INFECTION. ON PHYSICAL EXAM, THERE IS NO INSTABILITY. LIGAMENTS ARE VERY STABLE THROUGHOUT ROM.  HE IS TENDER OVER MEDIAL AND LATERAL CONDYLES TODAY.  HE IS STANDING IN KITCHEN AT WORK FOR LONG PERIODS OF TIME IN ONE POSITION.  HE WAS REASSURED THAT HIS KNEES ARE STILL HEALING AND THAT HE NEEDS MORE TIME.

## 2022-09-08 NOTE — HISTORY OF PRESENT ILLNESS
[Gradual] : gradual [5] : 5 [2] : 2 [Dull/Aching] : dull/aching [Sharp] : sharp [Intermittent] : intermittent [Rest] : rest [Ice] : ice [Nothing helps with pain getting better] : Nothing helps with pain getting better [Standing] : standing [] : yes [de-identified] : 09/08/2022 HERE FOR A FOLLOW UP H/O RIGHT TKA  DONE BY DR ROSE HAVING PAIN ,STIFFNESS ON THE SIDE OF THE KNEE [FreeTextEntry7] : down the legs  [FreeTextEntry9] : Tylenol  [de-identified] : motion  [de-identified] : NOTHING

## 2022-09-08 NOTE — PHYSICAL EXAM
[Bilateral] : knee bilaterally [] : patient ambulates without assistive device [FreeTextEntry8] : MEDIAL AND LATERAL EPICONDYLE TENDERNESS [TWNoteComboBox7] : flexion 120 degrees [de-identified] : extension 0 degrees

## 2022-09-13 NOTE — PHYSICAL THERAPY INITIAL EVALUATION ADULT - ADDITIONAL COMMENTS
Problem: Pain  Goal: #Acceptable pain level achieved/maintained at rest using NRS/Faces  Description: This goal is used for patients who can self-report.  Acceptable means the level is at or below the identified comfort/function goal.  Outcome: Outcome Met, Continue evaluating goal progress toward completion  Goal: # Acceptable pain level achieved/maintained at rest using NRS/Faces without oversedation (opioid naive or PCA/Epidural infusion)  Description: This goal is used if Opioid-naïve or on PCA/Epidural Infusion.  Outcome: Outcome Met, Continue evaluating goal progress toward completion  Goal: # Acceptable pain level achieved/maintained with activity using NRS/Faces  Description: This goal is used for patients who can self-report and are not achieving acceptable pain control during activity.  Outcome: Outcome Met, Continue evaluating goal progress toward completion  Goal: Acceptable pain/comfort level is achieved/maintained at rest (based on Pain Behaviors Scale)  Description: This goal is used for patients who are not able to self-report pain and are assessed for pain using the Pain Behaviors Scale  Outcome: Outcome Met, Continue evaluating goal progress toward completion  Goal: Acceptable pain/comfort level is achieved/maintained at rest based on PAINAID scale (Dementia)  Description: This goal is used for patients who are not able to self-report pain, have dementia, and assessed using the PAINAD scale.  Outcome: Outcome Met, Continue evaluating goal progress toward completion  Goal: Acceptable pain/comfort level is achieved/maintained at rest (based on pediatric behavior tool: NIPS, NPASS, or FLACC)  Description: This goal is used for pediatric patients who are not able to self report pain.  Outcome: Outcome Met, Continue evaluating goal progress toward completion  Goal: # Verbalizes understanding of pain management  Description: Documented in Patient Education Activity  Outcome: Outcome Met, Continue  evaluating goal progress toward completion  Goal: Verbalizes understanding and effective use of Patient Controlled Analgesia (PCA)  Description: Documented in Patient Education Activity  This goal is used for patients with PCA  Outcome: Outcome Met, Continue evaluating goal progress toward completion  Goal: Maximum comfort achieved/maintained at end of life (Hospice)  Outcome: Outcome Met, Continue evaluating goal progress toward completion      Pt  lives with wife in a private house, 4 entry steps (B/L far apart rails), no steps inside to negotiate. Pt has a walk-in shower with a retractable shower head, standard toilet seat height, & + grab bars. Can fit 3:1 commode over toilet. Pt is currently independent with all functional mobility and ADLS with straight cane. Expresses having difficulty with ADLS since R TKR Revision sx. Pt also owns rolling walker & commode ( in good working condition & easily accessible). Pt reports L knee 3-9/10 pain R knee 8/10 at rest & states it is worse with any activity 10/10. Pt endorses taking Tylenol/Tramadol for pain management.  Pt has   adverse effects with pain medication but unable to recall name of medication. Pt is not on out-pt physical therapy at present. There is no h/o fall or knee buckling in the past 6 months.  Pt is right hand dominant, B hearing aids, wears distance eye glasses, drives, & is retired. R TKR [id number]/1/12.

## 2022-09-23 NOTE — BRIEF OPERATIVE NOTE - CONDITION POST OP
Chief Complaint   Patient presents with    Follow-up     HTN, 6 month check up, DM    1. \"Have you been to the ER, urgent care clinic since your last visit? Hospitalized since your last visit? \" No    2. \"Have you seen or consulted any other health care providers outside of the [address] [place] [address] since your last visit? \" No     3. For patients aged 39-70: Has the patient had a colonoscopy / FIT/ Cologuard? Yes - no Care Gap present      If the patient is female:    4. For patients aged 41-77: Has the patient had a mammogram within the past 2 years? Yes - no Care Gap present      5. For patients aged 21-65: Has the patient had a pap smear?  Yes - no Care Gap present stable

## 2022-09-29 ENCOUNTER — RX RENEWAL (OUTPATIENT)
Age: 77
End: 2022-09-29

## 2022-09-29 RX ORDER — MONTELUKAST 10 MG/1
10 TABLET, FILM COATED ORAL
Qty: 90 | Refills: 0 | Status: ACTIVE | COMMUNITY
Start: 2021-11-16 | End: 1900-01-01

## 2022-10-18 NOTE — PATIENT PROFILE ADULT - NSPROGENANESREACTION_GEN_A_NUR
Subjective:       Patient ID: Que Hernández is a 29 y.o. male.    Vitals:  weight is 87.5 kg (193 lb). His temperature is 99.4 °F (37.4 °C). His blood pressure is 132/90 (abnormal) and his pulse is 94. His respiration is 16 and oxygen saturation is 95%.     Chief Complaint: Nasal Congestion (Entered by patient) and Generalized Body Aches    29-year-old male presents to clinic for evaluation of sore throat, congestion x2 days.  He also reports body aches, cough.  He is back today for COVID, denies any known sick contacts.  He is not taking any medications for his current symptoms.  He is awake and alert, answers questions appropriately, no acute distress noted on today's visit.    Sore Throat   This is a new problem. The current episode started yesterday. The problem has been unchanged. There has been no fever. The pain is at a severity of 5/10. The patient is experiencing no pain. Associated symptoms include congestion and coughing. Pertinent negatives include no headaches, shortness of breath or vomiting. He has tried nothing for the symptoms.     Constitution: Negative for activity change, appetite change, chills, sweating, fatigue and fever.   HENT:  Positive for congestion and sore throat.    Cardiovascular:  Negative for chest pain.   Respiratory:  Positive for cough. Negative for shortness of breath.    Gastrointestinal:  Negative for nausea and vomiting.   Neurological:  Negative for dizziness and headaches.     Objective:      Physical Exam   Constitutional: He is oriented to person, place, and time. He appears well-developed.  Non-toxic appearance. He does not appear ill. No distress.   HENT:   Head: Normocephalic and atraumatic. Head is without abrasion, without contusion and without laceration.   Ears:   Right Ear: External ear normal.   Left Ear: External ear normal.   Nose: Mucosal edema and congestion present. No rhinorrhea. Left sinus exhibits maxillary sinus tenderness.   Mouth/Throat: Oropharynx is  clear and moist and mucous membranes are normal.   Eyes: Conjunctivae, EOM and lids are normal. Right eye exhibits no discharge. Left eye exhibits no discharge.   Neck: Trachea normal and phonation normal.   Cardiovascular: Normal rate and normal heart sounds.   Pulmonary/Chest: Effort normal and breath sounds normal. No stridor. No respiratory distress. He has no wheezes.   Abdominal: Normal appearance.   Musculoskeletal: Normal range of motion.         General: Normal range of motion.   Neurological: He is alert and oriented to person, place, and time.   Skin: Skin is warm, dry, intact, not diaphoretic and not pale. No abrasion, No burn and No ecchymosis   Psychiatric: His speech is normal and behavior is normal. Mood, judgment and thought content normal.   Nursing note and vitals reviewed.      Results for orders placed or performed in visit on 10/18/22   POCT Influenza A/B MOLECULAR   Result Value Ref Range    POC Molecular Influenza A Ag Negative Negative, Not Reported    POC Molecular Influenza B Ag Negative Negative, Not Reported     Acceptable Yes    POCT COVID-19 Rapid Screening   Result Value Ref Range    POC Rapid COVID Negative Negative     Acceptable Yes        Assessment:       1. Acute bacterial sinusitis          Plan:         Acute bacterial sinusitis  -     POCT Influenza A/B MOLECULAR  -     POCT COVID-19 Rapid Screening  -     fluticasone propionate (FLONASE) 50 mcg/actuation nasal spray; 1 spray (50 mcg total) by Each Nostril route 2 (two) times daily.  Dispense: 9.9 mL; Refill: 0  -     azelastine (ASTELIN) 137 mcg (0.1 %) nasal spray; 1 spray (137 mcg total) by Nasal route 2 (two) times daily. for 10 days  Dispense: 30 mL; Refill: 0  -     amoxicillin-clavulanate 875-125mg (AUGMENTIN) 875-125 mg per tablet; Take 1 tablet by mouth 2 (two) times daily.  Dispense: 14 tablet; Refill: 0       - negative COVID, negative influenza on today's visit.  Will treat for acute  bacterial sinusitis.  Follow up with PCP, return to clinic as needed.  Patient verbalized understanding and is in agreement with plan.    Patient Instructions   - You must understand that you have received an Urgent Care treatment only and that you may be released before all of your medical problems are known or treated.   - You, the patient, will arrange for follow up care as instructed.   - If your condition worsens or fails to improve we recommend that you receive another evaluation at the ER immediately or contact your PCP to discuss your concerns or return here.        - Tylenol or Ibuprofen as directed as needed for fever/pain. Avoid tylenol if you have a history of liver disease. Do not take ibuprofen if you have a history of GI bleeding, kidney disease, or if you take blood thinners.   - Take ibuprofen 600-800 mg every 6-8 hours for pain and inflammation.  You can also take Tylenol/acetaminophen 650-1000 mg every 6-8 hours for added pain relief.     - You can take over-the-counter claritin, zyrtec, allegra, or xyzal as directed. These are antihistamines that can help with runny nose, nasal congestion, sneezing, and helps to dry up post-nasal drip, which usually causes sore throat and cough.              - If you do NOT have high blood pressure, you may use a decongestant form (D)  of this medication or if you do not take the D form, you can take sudafed (pseudoephedrine) over the counter, which is a decongestant.     - You can use Flonase (fluticasone) nasal spray as directed for sinus congestion and postnasal drip. This is a steroid nasal spray that works locally over time to decrease the inflammation in your nose/sinuses and help with allergic symptoms. This is not an quick- relief spray like afrin, but it works well if used daily.  Discontinue if you develop nose bleed  - use nasal saline prior to Flonase.     - Use Ocean Spray Nasal Saline 1-3 puffs each nostril every 2-3 hours then blow out onto tissue.  This is to irrigate the nasal passage way to clear the sinus openings. Use until sinus problem resolved.     - you can take plain Mucinex (guaifenesin) 1200 mg twice a day to help loosen mucous     -warm salt water gargles can help with sore throat     - warm tea with honey can help with cough. Honey is a natural cough suppressant.     - Follow up with your PCP or specialty clinic as directed in the next 1-2 weeks if not improved or as needed.  You can call [phone] to schedule an appointment with the appropriate provider.       - Go to the ER if you develop new or worsening symptoms.                    no previous reaction

## 2022-10-27 ENCOUNTER — APPOINTMENT (OUTPATIENT)
Dept: ORTHOPEDIC SURGERY | Facility: CLINIC | Age: 77
End: 2022-10-27

## 2022-10-27 DIAGNOSIS — M60.9 MYOSITIS, UNSPECIFIED: ICD-10-CM

## 2022-10-27 DIAGNOSIS — M25.552 PAIN IN LEFT HIP: ICD-10-CM

## 2022-10-27 PROCEDURE — 73502 X-RAY EXAM HIP UNI 2-3 VIEWS: CPT

## 2022-10-27 PROCEDURE — 99213 OFFICE O/P EST LOW 20 MIN: CPT

## 2022-10-27 NOTE — PHYSICAL EXAM
[Bilateral] : knee bilaterally [] : no sign of infection [FreeTextEntry8] : MEDIAL AND LATERAL EPICONDYLE TENDERNESS [TWNoteComboBox7] : flexion 120 degrees [de-identified] : extension 0 degrees

## 2022-10-27 NOTE — HISTORY OF PRESENT ILLNESS
[Gradual] : gradual [5] : 5 [2] : 2 [Dull/Aching] : dull/aching [Sharp] : sharp [Intermittent] : intermittent [Rest] : rest [Ice] : ice [Nothing helps with pain getting better] : Nothing helps with pain getting better [Standing] : standing [Walking] : walking [] : yes [de-identified] : 09/08/2022 HERE FOR A FOLLOW UP H/O RIGHT TKA  DONE BY DR ROSE HAVING PAIN ,STIFFNESS ON THE SIDE OF THE KNEE\par \par 10/27/22 HERE FOR A FOLLOW UP ON BOTH KNEES S/P TKAS HAVING PAIN ALSO ON BOTH HIPS \par  [FreeTextEntry7] : down the legs  [FreeTextEntry9] : Tylenol  [de-identified] : motion  [de-identified] : NOTHING

## 2022-10-27 NOTE — ASSESSMENT
[FreeTextEntry1] : S/P RT PARTIAL KNEE REPLACEMENT BY DR. ORDONEZ 1/2021\par S/P RIGHT TKA REVISION 12/01/2021: DOING WELL. HE IS CURRENTLY IN PT. IS HAVING SOME PAIN AND INSTABILITY. NO F/C/S. XRAYS REVIEWED WITH COMPONENTS WELL FIXED, IN GOOD POSITION. GOOD ROM. NO SIGNS OF INFECTION. GOOD LIGAMENT BALANCE. PRECAUTIONS AND ABX DISCUSSED.\par \par S/P LT TKA 2/23/22: NO F/C/S. DOING WELL. XRAYS REVIEWED WITH COMPONENTS WELL FIXED. NO SIGNS OF\par INFECTION. GOOD LIGAMENT BALANCE ON EXAM. DISCUSSED THE IMPORTANCE OF ELEVATION TO REDUCE SWELLING. ABX AND PRECAUTIONS REVIEWED. QUESTIONS ANSWERED. \par \par THERE IS NO EVIDENCE OF INFECTION. ON PHYSICAL EXAM, THERE IS NO INSTABILITY. LIGAMENTS ARE VERY STABLE THROUGHOUT ROM.  HE IS TENDER OVER MEDIAL AND LATERAL CONDYLES TODAY.  HE IS STANDING IN KITCHEN AT WORK FOR LONG PERIODS OF TIME IN ONE POSITION.  HE WAS REASSURED THAT HIS KNEES ARE STILL HEALING AND THAT HE NEEDS MORE TIME. \par \par 77 year M WITH MODERATE LT HIP PAIN. PAIN IS TO THE LATERAL ASPECT OF THE HIP. PAIN WORSENS WITH PROLONGED STANDING AND SITTING TO STAND. PAIN IS AFFECTING FUNCTIONAL ACTIVITIES; GETTING IN AND OUT OF CARS. XRAYS REVIEWED WITH OA, S/P LUMBAR FUSION. TREATMENT OPTIONS REVIEWED. LUMBAR PT RX. IF SYMPTOMS PERSIST WILL ORDER LUMBAR MRI AND FOLLOW UP WITH PAIN MANAGEMENT.

## 2022-11-18 ENCOUNTER — APPOINTMENT (OUTPATIENT)
Dept: PAIN MANAGEMENT | Facility: CLINIC | Age: 77
End: 2022-11-18

## 2022-11-18 ENCOUNTER — APPOINTMENT (OUTPATIENT)
Dept: CARDIOLOGY | Facility: CLINIC | Age: 77
End: 2022-11-18

## 2022-11-18 ENCOUNTER — NON-APPOINTMENT (OUTPATIENT)
Age: 77
End: 2022-11-18

## 2022-11-18 VITALS — SYSTOLIC BLOOD PRESSURE: 150 MMHG | DIASTOLIC BLOOD PRESSURE: 80 MMHG

## 2022-11-18 VITALS — BODY MASS INDEX: 25.39 KG/M2 | HEIGHT: 66 IN | WEIGHT: 158 LBS

## 2022-11-18 DIAGNOSIS — I25.110 ATHEROSCLEROTIC HEART DISEASE OF NATIVE CORONARY ARTERY WITH UNSTABLE ANGINA PECTORIS: ICD-10-CM

## 2022-11-18 PROCEDURE — 99204 OFFICE O/P NEW MOD 45 MIN: CPT

## 2022-11-18 PROCEDURE — 99214 OFFICE O/P EST MOD 30 MIN: CPT

## 2022-11-18 PROCEDURE — 93000 ELECTROCARDIOGRAM COMPLETE: CPT

## 2022-11-18 NOTE — HISTORY OF PRESENT ILLNESS
[Lower back] : lower back [Leisure] : leisure [Sleep] : sleep [Heat] : heat [Standing] : standing [Bending forward] : bending forward [6] : 6 [1] : 2 [Dull/Aching] : dull/aching [Radiating] : radiating [Sharp] : sharp [Stabbing] : stabbing [Tingling] : tingling [Intermittent] : intermittent [Meds] : meds [Injection therapy] : injection therapy [Walking] : walking [] : yes [FreeTextEntry6] : numbness [FreeTextEntry7] : right leg to b/l foot  [de-identified] : L MRI  [FreeTextEntry1] : 11/18/22: Pain is at the b/l posterior knees and radiates up the posterior thighs to the hips and lower back. Saw Dr. Daugherty who ordered new MRI and recommended pain mgt. \par \par MRI L-spine 10/29/22 independently reviewed: L1-2 moderate spinal stenosis \par \par 08/19/2022:s/p L2-3 LESI on 7/20/22 with 90% relief and improvement of ADLs. Overall has been feeling much better. Takes tylenol PRN. \par \par 06/24/2022 :s/p L2-L3 LESI on 6/8/22 with 50% relief and improvement of ADLs. Says he gets better from injections but when he does PT it aggravates it. \par \par 5/13/22: Pain is across the lower back and radiates down the right leg the ankle described as a sharp stabbing pain with associated numbness and tingling. Had knee surgery in Feb with Dr. Daugherty - has been doing PT.\par \par 10/22/21: pt reports supposed to have epidural inj last appt but was canceled due to a possible infection of right knee, however Dr. Daugherty said it was fine to have the injection but pt says he was not aware.\par \par 9/17/21: Pt scheduled for L3,4,5 MBB on Oct 6th but had some questions regarding the procedure. Pain still across the lower back with no radiation down the legs. Pt is supposed to have knee surgery with Dr. Daugherty but unsure when it will be.\par \par 8/27/21: s/p L2-3 LESI on 8/11/21 with 60% relief and improvement of ADLs. Has been feeling much better since the injection. Axial back pain is worse than the legs described as a stiffness.\par \par 7/30/21: s/p L2-3 LESI on 7/7/21 with 50% relief and improvement of ADLs. Pain still radiating down the legs but less severe. Pain is the worse across the lower back.\par \par Initial HPI:\par Pain is on the b/l lower back and radiates down the b/l lateral thighs and lower legs to the calves described as a sharp pain with associated numbness/tingling and cramping. Takes Tylenol PRN. Saw Dr. Steel who recommended surgery but wants to wait on surgery. s/p L3-S1 laminectomy several years ago.

## 2022-11-18 NOTE — HISTORY OF PRESENT ILLNESS
[FreeTextEntry1] : 77M with HTN, HLD who presents for eval of CP\par \par Pt notes multiple episodes of L sided CP, squeezing sensation, radiating down the L arm\par Episode occurred about 2 weeks ago, lasted for an hour\par Also notes an episode about a month ago\par Both episodes occurred while lying in bed, not with exertion\par He otherwise feels fine\par Has not done too much heavy exertion lately\par Elevated lipids, , intolerant to multiple statins (leg cramps) \par \par He reports a hx of a R CEA about 5 years ago \par \par Former smoker, quit in his 20s. Used to work for the [employer] department\par \par ECG: SR, no ST-T wave changes\par \par Amlodipine 5mg \par

## 2022-11-18 NOTE — DISCUSSION/SUMMARY
[de-identified] : After discussing various treatment options with the patient including but not limited to oral medications, physical therapy, exercise modalities as well as interventional spinal injections, we have decided with the following plan:\par \par - Continue Home exercises, stretching, activity modification, physical therapy, and conservative care.\par - MRI report and/or images was reviewed and discussed with the patient.\par - Recommend L1-2 Lumbar Epidural Steroid Injection under fluoroscopic guidance with image.\par - The risks, benefits and alternatives of the proposed procedure were explained in detail with the patient. The risks outlined include but are not limited to infection, bleeding, post-dural puncture headache, nerve injury, a temporary increase in pain, failure to resolve symptoms, allergic reaction, symptom recurrence, and possible elevation of blood sugar in diabetics. All questions were answered to patient's apparent satisfaction and he/she verbalized an understanding.\par - Patient is presenting with acute/sub-acute radicular pain with impairment in ADLs and functionality.  The pain has not responded to conservative care including NSAID therapy and/or physical therapy. There is no bleeding tendency, unstable medical condition, or systemic infection.\par - Follow up in 1-2 weeks post injection for re-evaluation.\par - Recommend continue Tylenol 1000mg Q8 hours PRN.\par \par

## 2022-11-18 NOTE — PHYSICAL EXAM
[de-identified] : Constitutional; Appears well, no apparent distress\par Ability to communicate: Normal \par Respiratory: non-labored breathing\par Skin: No rash noted\par Head: Normocephalic, atraumatic\par Neck: no visible thyroid enlargement\par Eyes: Extraocular movements intact\par Neurologic: Alert and oriented x3\par Psychiatric: normal mood, affect and behavior \par \par  [Flexion] : flexion [] : non-antalgic

## 2022-11-18 NOTE — DISCUSSION/SUMMARY
[EKG obtained to assist in diagnosis and management of assessed problem(s)] : EKG obtained to assist in diagnosis and management of assessed problem(s) [FreeTextEntry1] : Pt with concerning symptoms of late- L sided CP radiating down L arm, last occurred 2 weeks ago lasted for 1 hour. RF include age, HTN, HLD, former smoker\par \par Given high suspicion for CAD, will refer for cardiac cath. Offered to send patient straight to ER but pt refusing given lack of symptoms x 2 weeks and feeling OK at present. If any recurrence of symptoms would go straight to ER \par \par Pt needs treatment for HLD, intolerant to statins. Good candidate for PCSK-9 inhibitors\par \par HTN- BP running slightly high today, cont meds \par \par Will need echo after cath \par Address lipids after cath

## 2022-11-22 ENCOUNTER — OUTPATIENT (OUTPATIENT)
Dept: OUTPATIENT SERVICES | Facility: HOSPITAL | Age: 77
LOS: 1 days | End: 2022-11-22
Payer: MEDICARE

## 2022-11-22 DIAGNOSIS — Z98.49 CATARACT EXTRACTION STATUS, UNSPECIFIED EYE: Chronic | ICD-10-CM

## 2022-11-22 DIAGNOSIS — Z98.1 ARTHRODESIS STATUS: Chronic | ICD-10-CM

## 2022-11-22 DIAGNOSIS — Z96.651 PRESENCE OF RIGHT ARTIFICIAL KNEE JOINT: Chronic | ICD-10-CM

## 2022-11-22 DIAGNOSIS — Z98.890 OTHER SPECIFIED POSTPROCEDURAL STATES: Chronic | ICD-10-CM

## 2022-11-22 DIAGNOSIS — Z11.52 ENCOUNTER FOR SCREENING FOR COVID-19: ICD-10-CM

## 2022-11-22 LAB — SARS-COV-2 RNA SPEC QL NAA+PROBE: SIGNIFICANT CHANGE UP

## 2022-11-22 PROCEDURE — U0005: CPT

## 2022-11-22 PROCEDURE — C9803: CPT

## 2022-11-22 PROCEDURE — U0003: CPT

## 2022-11-25 ENCOUNTER — OUTPATIENT (OUTPATIENT)
Dept: OUTPATIENT SERVICES | Facility: HOSPITAL | Age: 77
LOS: 1 days | End: 2022-11-25
Payer: MEDICARE

## 2022-11-25 ENCOUNTER — TRANSCRIPTION ENCOUNTER (OUTPATIENT)
Age: 77
End: 2022-11-25

## 2022-11-25 VITALS
HEART RATE: 65 BPM | HEIGHT: 66 IN | DIASTOLIC BLOOD PRESSURE: 78 MMHG | RESPIRATION RATE: 18 BRPM | OXYGEN SATURATION: 98 % | TEMPERATURE: 98 F | SYSTOLIC BLOOD PRESSURE: 172 MMHG | WEIGHT: 158.07 LBS

## 2022-11-25 VITALS
DIASTOLIC BLOOD PRESSURE: 75 MMHG | SYSTOLIC BLOOD PRESSURE: 155 MMHG | RESPIRATION RATE: 17 BRPM | OXYGEN SATURATION: 95 % | HEART RATE: 70 BPM

## 2022-11-25 DIAGNOSIS — Z96.651 PRESENCE OF RIGHT ARTIFICIAL KNEE JOINT: Chronic | ICD-10-CM

## 2022-11-25 DIAGNOSIS — Z98.49 CATARACT EXTRACTION STATUS, UNSPECIFIED EYE: Chronic | ICD-10-CM

## 2022-11-25 DIAGNOSIS — Z98.1 ARTHRODESIS STATUS: Chronic | ICD-10-CM

## 2022-11-25 DIAGNOSIS — Z98.890 OTHER SPECIFIED POSTPROCEDURAL STATES: Chronic | ICD-10-CM

## 2022-11-25 DIAGNOSIS — I20.0 UNSTABLE ANGINA: ICD-10-CM

## 2022-11-25 LAB
ANION GAP SERPL CALC-SCNC: 15 MMOL/L — SIGNIFICANT CHANGE UP (ref 5–17)
BUN SERPL-MCNC: 23 MG/DL — SIGNIFICANT CHANGE UP (ref 7–23)
CALCIUM SERPL-MCNC: 9.3 MG/DL — SIGNIFICANT CHANGE UP (ref 8.4–10.5)
CHLORIDE SERPL-SCNC: 107 MMOL/L — SIGNIFICANT CHANGE UP (ref 96–108)
CO2 SERPL-SCNC: 18 MMOL/L — LOW (ref 22–31)
CREAT SERPL-MCNC: 1.27 MG/DL — SIGNIFICANT CHANGE UP (ref 0.5–1.3)
EGFR: 58 ML/MIN/1.73M2 — LOW
GLUCOSE SERPL-MCNC: 97 MG/DL — SIGNIFICANT CHANGE UP (ref 70–99)
HCT VFR BLD CALC: 47.5 % — SIGNIFICANT CHANGE UP (ref 39–50)
HGB BLD-MCNC: 15.4 G/DL — SIGNIFICANT CHANGE UP (ref 13–17)
MCHC RBC-ENTMCNC: 30.1 PG — SIGNIFICANT CHANGE UP (ref 27–34)
MCHC RBC-ENTMCNC: 32.4 GM/DL — SIGNIFICANT CHANGE UP (ref 32–36)
MCV RBC AUTO: 93 FL — SIGNIFICANT CHANGE UP (ref 80–100)
NRBC # BLD: 0 /100 WBCS — SIGNIFICANT CHANGE UP (ref 0–0)
PLATELET # BLD AUTO: 233 K/UL — SIGNIFICANT CHANGE UP (ref 150–400)
POTASSIUM SERPL-MCNC: 5.2 MMOL/L — SIGNIFICANT CHANGE UP (ref 3.5–5.3)
POTASSIUM SERPL-SCNC: 5.2 MMOL/L — SIGNIFICANT CHANGE UP (ref 3.5–5.3)
RBC # BLD: 5.11 M/UL — SIGNIFICANT CHANGE UP (ref 4.2–5.8)
RBC # FLD: 13.5 % — SIGNIFICANT CHANGE UP (ref 10.3–14.5)
SODIUM SERPL-SCNC: 140 MMOL/L — SIGNIFICANT CHANGE UP (ref 135–145)
WBC # BLD: 7.03 K/UL — SIGNIFICANT CHANGE UP (ref 3.8–10.5)
WBC # FLD AUTO: 7.03 K/UL — SIGNIFICANT CHANGE UP (ref 3.8–10.5)

## 2022-11-25 PROCEDURE — 93005 ELECTROCARDIOGRAM TRACING: CPT

## 2022-11-25 PROCEDURE — 85027 COMPLETE CBC AUTOMATED: CPT

## 2022-11-25 PROCEDURE — 80048 BASIC METABOLIC PNL TOTAL CA: CPT

## 2022-11-25 PROCEDURE — 93454 CORONARY ARTERY ANGIO S&I: CPT

## 2022-11-25 PROCEDURE — 93010 ELECTROCARDIOGRAM REPORT: CPT

## 2022-11-25 PROCEDURE — 93454 CORONARY ARTERY ANGIO S&I: CPT | Mod: 26

## 2022-11-25 PROCEDURE — C1887: CPT

## 2022-11-25 PROCEDURE — 93571 IV DOP VEL&/PRESS C FLO 1ST: CPT | Mod: 26,LD

## 2022-11-25 PROCEDURE — C1894: CPT

## 2022-11-25 PROCEDURE — 93571 IV DOP VEL&/PRESS C FLO 1ST: CPT | Mod: LD

## 2022-11-25 PROCEDURE — C1769: CPT

## 2022-11-25 RX ORDER — SODIUM CHLORIDE 9 MG/ML
3 INJECTION INTRAMUSCULAR; INTRAVENOUS; SUBCUTANEOUS EVERY 8 HOURS
Refills: 0 | Status: DISCONTINUED | OUTPATIENT
Start: 2022-11-25 | End: 2022-12-09

## 2022-11-25 RX ORDER — SODIUM CHLORIDE 9 MG/ML
1000 INJECTION INTRAMUSCULAR; INTRAVENOUS; SUBCUTANEOUS
Refills: 0 | Status: DISCONTINUED | OUTPATIENT
Start: 2022-11-25 | End: 2022-12-09

## 2022-11-25 RX ADMIN — SODIUM CHLORIDE 125 MILLILITER(S): 9 INJECTION INTRAMUSCULAR; INTRAVENOUS; SUBCUTANEOUS at 11:27

## 2022-11-25 NOTE — H&P CARDIOLOGY - HISTORY OF PRESENT ILLNESS
76 y/o M with PMHx of HTN, HLD, prediabetes (A1C 6.2), COPD, lung nodules, FRANCO non on CPAP, spinal stenosis s/p fusion, former smoker, carotis artery stenosis s/p CEA rt, right inguinal hernia, presents for cardiac cath. Pt reports he has been feeling left side chest pain radiate to left arm on & off for past couple of months, evaluated by Dr. Mohamud and referred for cath. Last episode of chest pain was last night without exertion.       Card: Thom Mohamud

## 2022-11-25 NOTE — ASU DISCHARGE PLAN (ADULT/PEDIATRIC) - NS MD DC FALL RISK RISK
For information on Fall & Injury Prevention, visit: [email or web address] OR  [email or web address] OR  https://www.cdc.gov/steadi/patient.html

## 2022-11-25 NOTE — ASU DISCHARGE PLAN (ADULT/PEDIATRIC) - ASU DC SPECIAL INSTRUCTIONSFT
Wound Care:   the day AFTER your procedure remove bandage GENTLY, and clean using  mild soap and warm water stream, pat dry. Leave the area OPEN to air. YOU MAY SHOWER 24 hour after procedure.   DO NOT apply lotions, creams, ointments, powder, perfumes to the puncture site.  DO NOT SOAK the site for 1 week (no tub bath, no swimming, etc.)  Check  your groin and /or wrist daily. A small amount of bruising and sourness are normal.     ACTIVITY: for 24 hours   - DO NOT DRIVE  - DO NOT make any important decisions or sign legal documents   - DO NOT operate heavy duty machineries   - you may resume sexual activity in 48 hours, unless otherwise instructed by your cardiologist     If your procedure was done through the WRIST: for the NEXT 3DAYS:  - avoid pushing, pulling, with that affected wrist   - avoid repeated movement of that hand and wrist (eg: typing, hammering)  - DO NOT LIFT anything more than 5 lbs     If your procedure was done through the GROIN: for the NEXT 5 DAYS  - Limit climbing stairs, DO NOT soak in bathtub or pool  - no strenuous activities, pushing, pulling, straining  - Do not lift anything 10lbs or heavier     MEDICATION:   Take your medications as explained (see discharge paperwork)   If you received a STENT, you will be taking antiplatelet medications to KEEP YOUR STENT OPEN (eg: Aspirin, Plavix, Brilinta, Effient, etc).  Take as prescribed, DO NOT STOP taking them without consulting with your cardiologist first.     Follow heart healthy diet recommended by your doctor, if you smoke STOP SMOKING (may call [phone] for center of tobacco control if you need assistance)     CALL your doctor to make appointment in 2 WEEKS     ***CALL YOUR DOCTOR***  if you experience: fever, chills, body aches, or severe pain, swelling, redness, heat or yellow discharge at incision site  If you experience Bleeding or excruciating pain at the procedural site, swelling (golf ball size) at your procedural site  If you experience CHEST PAIN  If you experience extremity numbness, tingling, temperature change (of your procedural site)   If you are unable to reach your doctor, you may contact:   -Cardiology Office at [facility] at [phone] or [facility] [phone]- [facility] [phone]

## 2022-11-25 NOTE — H&P CARDIOLOGY - NSICDXPASTMEDICALHX_GEN_ALL_CORE_FT
PAST MEDICAL HISTORY:  COPD (chronic obstructive pulmonary disease) Bronchiectasis, recent PFT test was normal (as per patient ), followed by Dr. Cheng    Deviated septum septum perforation , , inconsequential ,    Former smoker     GERD (gastroesophageal reflux disease) Moreno's esophagus    Hypercholesteremia     Hypertension     Other and unspecified ventral hernia with obstruction, without gangrene     Sleep apnea information from Dr Pierre PCP 12/26/18- does not use CPAP

## 2022-11-25 NOTE — ASU DISCHARGE PLAN (ADULT/PEDIATRIC) - CARE PROVIDER_API CALL
Thom Mohamud)  Internal Medicine  [address] [place] [address]  Phone: [phone]  Fax: [phone]  Follow Up Time: 2 weeks

## 2022-11-25 NOTE — H&P CARDIOLOGY - NSICDXPASTSURGICALHX_GEN_ALL_CORE_FT
PAST SURGICAL HISTORY:  H/O colonoscopy 2015    H/O spinal fusion 2000  rods cervical and lumbar spine    H/O total knee replacement, right 2021    History of cataract surgery (Bilateral eyes)    History of tonsillectomy (Age 12)    S/P carotid endarterectomy (Right, 2/2018)    S/P total knee replacement, right revision done Dec 2021

## 2022-11-28 PROBLEM — Z87.891 PERSONAL HISTORY OF NICOTINE DEPENDENCE: Chronic | Status: ACTIVE | Noted: 2022-11-25

## 2022-12-05 ENCOUNTER — APPOINTMENT (OUTPATIENT)
Dept: CARDIOLOGY | Facility: CLINIC | Age: 77
End: 2022-12-05

## 2022-12-05 VITALS
BODY MASS INDEX: 26.03 KG/M2 | HEART RATE: 67 BPM | TEMPERATURE: 98.2 F | SYSTOLIC BLOOD PRESSURE: 130 MMHG | WEIGHT: 162 LBS | OXYGEN SATURATION: 97 % | HEIGHT: 66 IN | DIASTOLIC BLOOD PRESSURE: 65 MMHG

## 2022-12-05 PROCEDURE — 99214 OFFICE O/P EST MOD 30 MIN: CPT

## 2022-12-05 NOTE — DISCUSSION/SUMMARY
[FreeTextEntry1] : CAD: Moderate on cath, no further symptoms. Suspect symptoms were noncardiac given moderate CAD and rapid improvement\par \par HTN: BP good today, cont norvasc\par \par HLD: Lipids high, intolerant to statin. Repeat fasting lipids, consider PCSK-9 vs zetia \par \par Check TTE\par \par RV 6M

## 2022-12-05 NOTE — HISTORY OF PRESENT ILLNESS
[FreeTextEntry1] : 77M with HTN, HLD who presents for follow up after cardiac cath\par \par Pt was experiencing L sided CP, radiating down the L arm\par Underwent LHC with moderate CAD as below\par Since then, he has felt well, no further CP, no dyspnea, no lightheadedness\par Elevated lipids, , intolerant to multiple statins (leg cramps) \par \par He reports a hx of a R CEA about 5 years ago \par \par Former smoker, quit in his 20s. Used to work for the [employer]\par \par ECG: SR, no ST-T wave changes\par [facility] 11/25/22: 50% pLAD, 50%  mLAD, (iFR 0.93)\par \par Amlodipine 5mg \par

## 2022-12-08 ENCOUNTER — APPOINTMENT (OUTPATIENT)
Dept: INTERNAL MEDICINE | Facility: CLINIC | Age: 77
End: 2022-12-08

## 2022-12-08 DIAGNOSIS — R07.89 OTHER CHEST PAIN: ICD-10-CM

## 2022-12-08 PROCEDURE — 93306 TTE W/DOPPLER COMPLETE: CPT

## 2022-12-11 ENCOUNTER — RX RENEWAL (OUTPATIENT)
Age: 77
End: 2022-12-11

## 2022-12-26 ENCOUNTER — NON-APPOINTMENT (OUTPATIENT)
Age: 77
End: 2022-12-26

## 2022-12-28 ENCOUNTER — APPOINTMENT (OUTPATIENT)
Age: 77
End: 2022-12-28
Payer: MEDICARE

## 2022-12-28 PROCEDURE — 64494 INJ PARAVERT F JNT L/S 2 LEV: CPT | Mod: 50

## 2022-12-28 PROCEDURE — 64493 INJ PARAVERT F JNT L/S 1 LEV: CPT | Mod: 50

## 2023-01-08 ENCOUNTER — APPOINTMENT (OUTPATIENT)
Dept: INTERNAL MEDICINE | Facility: CLINIC | Age: 78
End: 2023-01-08

## 2023-01-09 ENCOUNTER — APPOINTMENT (OUTPATIENT)
Dept: UROLOGY | Facility: CLINIC | Age: 78
End: 2023-01-09
Payer: MEDICARE

## 2023-01-09 ENCOUNTER — RX RENEWAL (OUTPATIENT)
Age: 78
End: 2023-01-09

## 2023-01-09 VITALS
TEMPERATURE: 97.5 F | HEART RATE: 68 BPM | WEIGHT: 163 LBS | OXYGEN SATURATION: 97 % | DIASTOLIC BLOOD PRESSURE: 90 MMHG | BODY MASS INDEX: 26.31 KG/M2 | SYSTOLIC BLOOD PRESSURE: 165 MMHG

## 2023-01-09 DIAGNOSIS — R31.9 HEMATURIA, UNSPECIFIED: ICD-10-CM

## 2023-01-09 PROCEDURE — 99213 OFFICE O/P EST LOW 20 MIN: CPT

## 2023-01-09 PROCEDURE — 51798 US URINE CAPACITY MEASURE: CPT

## 2023-01-09 RX ORDER — AMLODIPINE BESYLATE 5 MG/1
5 TABLET ORAL DAILY
Qty: 30 | Refills: 0 | Status: ACTIVE | COMMUNITY
Start: 2021-02-17 | End: 1900-01-01

## 2023-01-09 RX ORDER — OMEPRAZOLE 40 MG/1
40 CAPSULE, DELAYED RELEASE ORAL
Qty: 180 | Refills: 0 | Status: DISCONTINUED | COMMUNITY
Start: 2021-02-17 | End: 2023-01-09

## 2023-01-13 ENCOUNTER — APPOINTMENT (OUTPATIENT)
Dept: PAIN MANAGEMENT | Facility: CLINIC | Age: 78
End: 2023-01-13
Payer: MEDICARE

## 2023-01-13 VITALS — WEIGHT: 162 LBS | HEIGHT: 66 IN | BODY MASS INDEX: 26.03 KG/M2

## 2023-01-13 DIAGNOSIS — M48.07 SPINAL STENOSIS, LUMBOSACRAL REGION: ICD-10-CM

## 2023-01-13 PROCEDURE — 99214 OFFICE O/P EST MOD 30 MIN: CPT

## 2023-01-13 NOTE — PHYSICAL EXAM
[Flexion] : flexion [de-identified] : Constitutional; Appears well, no apparent distress\par Ability to communicate: Normal \par Respiratory: non-labored breathing\par Skin: No rash noted\par Head: Normocephalic, atraumatic\par Neck: no visible thyroid enlargement\par Eyes: Extraocular movements intact\par Neurologic: Alert and oriented x3\par Psychiatric: normal mood, affect and behavior \par \par  [] : non-antalgic

## 2023-01-13 NOTE — DISCUSSION/SUMMARY
[de-identified] : After discussing various treatment options with the patient including but not limited to oral medications, physical therapy, exercise modalities as well as interventional spinal injections, we have decided with the following plan:\par \par - Continue Home exercises, stretching, activity modification, physical therapy, and conservative care.\par - MRI report and/or images was reviewed and discussed with the patient.\par - Recommend Second Diagnostic Bilateral L3,L4,L5 Medial Branch Blocks under fluoroscopic guidance with image. First diagnostic MBBs resulted in >80% relief and improvement of ADLs for the duration of the local anesthetic \par - Patient presents with axial lumbar pain that has not responded to 3 months of conservative therapy including physical therapy or NSAID therapy.  The pain is interfering with activities of daily living and functionality. There is no radicular pain. The pain is exacerbated by facet loading / positive Kemps maneuver which is defined by pain reproduction with extension and rotation of the lumbar spine to the affected side.  The patient has not had a vertebral fusion at the levels of the proposed treatment.  There is no unexplained neurologic deficit.  There is no history of systemic infection, unstable medical condition, bleeding tendency, or local infection.  The injection is being performed to diagnose the facet joint as the source of the individual's pain, in preparation for a radiofrequency ablation. \par - The risks, benefits, contents and alternatives to injection were explained in full to the patient.  Risks outlined include but are not limited to infection, sepsis, bleeding, post-dural puncture headache, nerve damage, temporary increase in pain, syncopal episode, failure to resolve symptoms, allergic reaction, symptom recurrence, and elevation of blood sugar in diabetics. Cortisone may cause immunosuppression.  Patient understands the risks.  All questions were answered.  After discussion of options, patient requested an injection.  Information regarding the injection was given to the patient.  Which medications to stop prior to the injection was explained to the patient as well.\par - Follow up in 1-2 weeks post injection for re-evaluation.\par - Recommend continue Tylenol 1000mg Q8 hours PRN.\par \par

## 2023-01-13 NOTE — HISTORY OF PRESENT ILLNESS
[Lower back] : lower back [6] : 6 [1] : 2 [Dull/Aching] : dull/aching [Radiating] : radiating [Sharp] : sharp [Stabbing] : stabbing [Tingling] : tingling [Intermittent] : intermittent [Leisure] : leisure [Sleep] : sleep [Meds] : meds [Heat] : heat [Standing] : standing [Injection therapy] : injection therapy [Walking] : walking [Bending forward] : bending forward [] : no [FreeTextEntry1] : right hip [FreeTextEntry6] : numbness [FreeTextEntry7] : right leg to b/l foot  [de-identified] : L MRI

## 2023-01-15 PROBLEM — R31.9 HEMATURIA: Status: ACTIVE | Noted: 2021-12-09

## 2023-01-15 LAB
BACTERIA UR CULT: NORMAL
URINE CYTOLOGY: NORMAL

## 2023-01-15 NOTE — ASSESSMENT
[FreeTextEntry1] : 77 year old male with LUTS and significant proteinuria \par \par PSA, culture, POCT, sent today \par \par PVR today 91 \par \par POCT today neg for glucose, bilirubin, hematuria \par \par all options d/w pt- \par rationale, risks, benefits, alternatives \par answered all questions - pt understood \par \par pt opts to try flomax again \par \par Renal eval pending -reemphasized need to have renal eval and 24 hour urine for protein etc\par \par pending labs, \par f/u 6 months psa, pv, flow, ipss\par ?prostate ablative procedure

## 2023-01-15 NOTE — END OF VISIT
[FreeTextEntry3] : Medical record entries made by the scribe today, were at my direction and personally dictated to\par them by me, Dr. Meliton Cruz on 01/09/2023. I have reviewed the chart and agree that\par the record accurately reflects my personal performance of the history, physical exam,\par assessment, and plan.

## 2023-01-15 NOTE — HISTORY OF PRESENT ILLNESS
[FreeTextEntry1] : 77 year old male with h/o LUTS and hematuria here today for follow up \par \par c/o nocturia 3x, large urine output, otherwise doing okay \par \par notes reducing protein dietary intake -pt thought it would help proteinuria\par \par IPSS 1/9/23: 28, increased from 7 on 3/22/22\par \par PSA 7/10/23 3.81, trending up from 2.75 from 8/31/21\par \par \par

## 2023-02-02 ENCOUNTER — APPOINTMENT (OUTPATIENT)
Dept: ORTHOPEDIC SURGERY | Facility: CLINIC | Age: 78
End: 2023-02-02
Payer: MEDICARE

## 2023-02-02 VITALS — HEIGHT: 66 IN | BODY MASS INDEX: 26.03 KG/M2 | WEIGHT: 162 LBS

## 2023-02-02 DIAGNOSIS — Z96.651 PRESENCE OF RIGHT ARTIFICIAL KNEE JOINT: ICD-10-CM

## 2023-02-02 DIAGNOSIS — M54.16 RADICULOPATHY, LUMBAR REGION: ICD-10-CM

## 2023-02-02 PROCEDURE — 99214 OFFICE O/P EST MOD 30 MIN: CPT

## 2023-02-02 NOTE — PHYSICAL EXAM
[Bilateral] : knee bilaterally [] : no sign of infection [FreeTextEntry8] : MEDIAL AND LATERAL EPICONDYLE TENDERNESS [TWNoteComboBox7] : flexion 120 degrees [de-identified] : extension 0 degrees

## 2023-02-02 NOTE — HISTORY OF PRESENT ILLNESS
[Gradual] : gradual [5] : 5 [2] : 2 [Dull/Aching] : dull/aching [Sharp] : sharp [Intermittent] : intermittent [Rest] : rest [Ice] : ice [Nothing helps with pain getting better] : Nothing helps with pain getting better [Standing] : standing [Walking] : walking [] : yes [de-identified] : 09/08/2022 HERE FOR A FOLLOW UP H/O RIGHT TKA  DONE BY DR ROSE HAVING PAIN ,STIFFNESS ON THE SIDE OF THE KNEE\par \par 10/27/22 HERE FOR A FOLLOW UP ON BOTH KNEES S/P TKAS HAVING PAIN ALSO ON BOTH HIPS \par  [FreeTextEntry7] : down the legs  [FreeTextEntry9] : Tylenol  [de-identified] : motion  [de-identified] : NOTHING

## 2023-02-02 NOTE — ASSESSMENT
[FreeTextEntry1] : S/P RT PARTIAL KNEE REPLACEMENT BY DR. ORDONEZ 1/2021\par S/P RIGHT TKA REVISION 12/1/21: DOING WELL. HE IS CURRENTLY IN PT. IS HAVING SOME PAIN AND INSTABILITY. NO F/C/S. XRAYS REVIEWED WITH COMPONENTS WELL FIXED, IN GOOD POSITION. GOOD ROM. NO SIGNS OF INFECTION. GOOD LIGAMENT BALANCE. PRECAUTIONS AND ABX DISCUSSED.\par \par S/P LT TKA 2/23/22: NO F/C/S. DOING WELL. XRAYS REVIEWED WITH COMPONENTS WELL FIXED. NO SIGNS OF\par INFECTION. GOOD LIGAMENT BALANCE ON EXAM. DISCUSSED THE IMPORTANCE OF ELEVATION TO REDUCE SWELLING. ABX AND PRECAUTIONS REVIEWED. QUESTIONS ANSWERED. \par \par THERE IS NO EVIDENCE OF INFECTION. ON PHYSICAL EXAM, THERE IS NO INSTABILITY. LIGAMENTS ARE VERY STABLE THROUGHOUT ROM.  HE IS TENDER OVER MEDIAL AND LATERAL CONDYLES TODAY.  HE IS STANDING IN KITCHEN AT WORK FOR LONG PERIODS OF TIME IN ONE POSITION.  HE WAS REASSURED THAT HIS KNEES ARE STILL HEALING AND THAT HE NEEDS MORE TIME. \par \par C/O B/L KNEE PAIN, R>L. PAIN TO THE RIGHT KNEE IS TO THE LATERAL ASPECT OF THE KNEE. THE PAIN RADIATES. THE KNEE PAIN IS LIKELY COMING FROM HIS BACK. HAD RELIEF FROM KNEE PAIN AFTER LESI. HE WILL FOLLOW UP WITH DR. CMKAY.

## 2023-02-07 ENCOUNTER — APPOINTMENT (OUTPATIENT)
Dept: FAMILY MEDICINE | Facility: CLINIC | Age: 78
End: 2023-02-07

## 2023-02-17 ENCOUNTER — APPOINTMENT (OUTPATIENT)
Dept: ORTHOPEDIC SURGERY | Facility: CLINIC | Age: 78
End: 2023-02-17
Payer: MEDICARE

## 2023-02-17 VITALS — HEIGHT: 66 IN | BODY MASS INDEX: 26.52 KG/M2 | WEIGHT: 165 LBS

## 2023-02-17 DIAGNOSIS — M17.0 BILATERAL PRIMARY OSTEOARTHRITIS OF KNEE: ICD-10-CM

## 2023-02-17 DIAGNOSIS — Z96.653 PRESENCE OF ARTIFICIAL KNEE JOINT, BILATERAL: ICD-10-CM

## 2023-02-17 PROCEDURE — 73564 X-RAY EXAM KNEE 4 OR MORE: CPT | Mod: 50

## 2023-02-17 PROCEDURE — 99204 OFFICE O/P NEW MOD 45 MIN: CPT

## 2023-02-17 NOTE — PHYSICAL EXAM
[de-identified] : General appearance: well nourished and hydrated, pleasant, alert and oriented x 3, cooperative.\par HEENT: Normocephalic, EOM intact, Nasal septum midline, Oral cavity clear, External auditory canal clear.\par Cardiovascular: no apparent abnormalities, no lower leg edema, no varicosities, pedal pulses are palpable.\par Lymphatics Lymph nodes: none palpated, Lymphedema: not present.\par Neurologic: decreased sensation in both feet from ankles to toes, no muscle weakness in upper or lower extremities, patella tendon reflexes intact .\par Dermatologic: mild excoriation of left lower extremity\par Spine:cervical spine appears normal and moves freely, thoracic spine appears normal and moves freely, healed midline incision over lumbosacral spine with limited mobility.\par Gait: nonantalgic.\par \par Left knee\par Inspection: no effusion or erythema.\par Wounds: healed midline incision \par Alignment: normal.\par Palpation: no specific tenderness on palpation.\par ROM active (in degrees): 0-120\par Ligamentous laxity: , negative ant. drawer test, negative post. drawer test, stable to varus stress test, stable to valgus stress test. negative Lachman's test, negative pivot shift test\par Patellofemoral Alignment Test: Q angle-, normal.\par Muscle Test: good quad strength.\par Leg examination: calf is soft and non-tender.\par \par Right knee\par Inspection: trace effusion, no erythema. \par Wounds: healed midline incision \par Alignment: normal.\par Palpation: medial joint line and lateral iliotibial band tenderness on palpation.\par ROM active (in degrees): 0-120 \par Ligamentous laxity: increased AP translation 5 mm, medial lateral laxity 2-3 mm, negative ant. drawer test, negative post. drawer test, stable to varus stress test, stable to valgus stress test. negative Lachman's test, negative pivot shift test\par Patellofemoral Alignment Test: Q angle-, normal.\par Muscle Test: good quad strength.\par Leg examination: calf is soft and non-tender.\par \par Left hip\par Inspection: No swelling or ecchymosis.\par Wounds: none.\par Palpation: non-tender.\par Stability: no instability.\par Strength: 5/5 all motor groups.\par ROM: no pain with FROM.\par Leg length: equal.\par \par Right hip\par Inspection: No swelling or ecchymosis.\par Wounds: none.\par Palpation: non-tender.\par Stability: no instability.\par Strength: 5/5 all motor groups.\par ROM: no pain with FROM.\par Leg length: equal.\par \par Left ankle\par Inspection: no erythema noted, no swelling noted.\par Palpation: no pain on palpation .\par ROM: FROM without crepitus.\par Muscle strength: 5/5.\par Stability: no instability noted.\par \par Right ankle\par Inspection: no erythema noted, no swelling noted.\par ROM: FROM without crepitus.\par Palpation: no pain on palpation .\par Muscle strength: 5/5.\par Stability: no instability noted.\par \par Left foot\par Inspection: color, texture and turgor are normal.\par ROM: full range of motion of all joints without pain or crepitus.\par Palpation: no tenderness.\par Stability: no instability noted.\par \par Right foot\par Inspection: color, texture and turgor are normal.\par ROM: full range of motion of all joints without pain or crepitus.\par Palpation: no tenderness.\par Stability: no instability noted.\par \par Left shoulder\par Inspection: no muscle asymmetry, no atrophy.\par Palpation: no tenderness noted, ACJ non-tender.\par ROM: full active ROM, full passive ROM.\par Strength testing): anterior deltoid, supraspinatus, infraspinatus, subscapularis all 5/5.\par Stability test: ant. apprehension negative, post. apprehension negative, relocation test negative.\par Impingement Test: negative NEER.\par \par Right shoulder\par Inspection: no muscle asymmetry, no atrophy.\par Palpation: no tenderness noted, ACJ non-tender.\par ROM: full active ROM, full passive ROM.\par Strength testing): anterior deltoid, supraspinatus, infraspinatus, subscapularis all 5/5.\par Stability test: ant. apprehension negative, post. apprehension negative, relocation test negative.\par Impingement Test: negative NEER.\par Surgical Wounds: none.\par \par Left elbow\par Inspection: negative swelling.\par Wounds: none.\par Palpation: non-tender.\par ROM: full ROM.\par Strength: 5/5 all groups.\par Stability: no instability.\par Mass: none.\par \par Right elbow\par Inspection: negative swelling.\par Wounds: none.\par Palpation: non-tender.\par ROM: full ROM.\par Strength: 5/5 all groups.\par Stability: no instability.\par Mass: none.\par \par Left wrist\par Inspection: negative swelling.\par Wound: none.\par Palpation (bone): no tenderness.\par ROM: full ROM.\par Strength: full , good.\par \par Right wrist\par Inspection: negative swelling.\par Wound: none.\par Palpation (bone): no tenderness.\par ROM: full ROM.\par Strength: full , good.\par \par Left hand \par Inspection: no skin changes, normal appearance.\par Wounds: none.Strength: full , able to make full fist.\par Sensation: light touch intact all fingers and thumb.\par Vascular: good capillary refill < 3 seconds, all fingers and thumb.\par Mass: none.\par \par Right hand \par Inspection: no skin changes, normal appearance.\par Wounds: none.Strength: full , able to make full fist.\par Sensation: light touch intact all fingers and thumb.\par Vascular: good capillary refill < 3 seconds, all fingers and thumb.\par Mass: none. [de-identified] : RIGHT knee x-ray, AP, lateral, merchant view taken at the office today demonstrates a total knee replacement with radial loosening zone 1 and a tibial component reduced posterior condyle offset. The patella sits in a central position. \par \par LEFT knee x-ray, AP, lateral, merchant view taken at the office today demonstrates a total knee replacement in satisfactory position and alignment. No evidence of loosening. The patella sits in a central position.

## 2023-02-17 NOTE — HISTORY OF PRESENT ILLNESS
[de-identified] : DHRUV DONOHUE 78 year old male presents for a second opinion evaluation of B/L knees. He reports that his right knee is more symptomatic than his left. He has minimal discomfort in his left knee. He had a partial knee replacement in his right knee in 2021 by Dr. Ornelas This was a failed surgery because the implants became loose. He than had a right TKR revision done in December 2021 by Dr. Daugherty. He had a left TKR done in February 2022 by Dr. Daugherty. Since his right revision TKR he had interval improvement of pain. Since last year he has been experiencing medial and lateral pain in his right knee. The pain radiates down laterally from his right hip to his knee. The pain is sharp with intermittent swelling, a sensation of falling to the right, and buckling. He denies any locking. He has clicking through ROM. He can walk a couple blocks. He uses a cane for ambulation because of his sensation of falling to the right. He used a brace with no relief. He uses Tylenol for pain control. He has a history of lumber spinal fusion. He underwent lumbar epidural and steroid injection. The injection provided relief to his right knee pain and right hip pain for some days before the pain returned again. His spine surgeon recommended removing components and a new fusion. His pain management doctor planes for him to undergo cortisone injections for his lower back pain. He denies any fever, chills, redness, or drainage from his incision. He had normal healing post operatively without any complications.

## 2023-02-17 NOTE — DISCUSSION/SUMMARY
[de-identified] : Their bilateral TKR is doing well. His symptoms appear related to his lower back condition and lumbar radiculopathy. He has seen a spine specialist in the past who recommended a removal of previous fusion hardware and possibly surmising a decompression. For a second opinion for his spine as per his request, I referred him to Dr Mcgregor. \par \par Regarding his right knee, no further surgical intervention is necessary. In the interim, I suggested home exercise and activities as tolerated. They will return to Dr Daugherty for continued care.\par \par All questions answered, understanding verbalized. Patient in agreement with plan of care.

## 2023-02-20 LAB — SARS-COV-2 N GENE NPH QL NAA+PROBE: NOT DETECTED

## 2023-02-21 ENCOUNTER — APPOINTMENT (OUTPATIENT)
Dept: UROLOGY | Facility: CLINIC | Age: 78
End: 2023-02-21
Payer: MEDICARE

## 2023-02-21 VITALS
SYSTOLIC BLOOD PRESSURE: 151 MMHG | OXYGEN SATURATION: 98 % | TEMPERATURE: 97.8 F | HEART RATE: 68 BPM | DIASTOLIC BLOOD PRESSURE: 74 MMHG

## 2023-02-21 PROCEDURE — 51798 US URINE CAPACITY MEASURE: CPT

## 2023-02-21 PROCEDURE — 99213 OFFICE O/P EST LOW 20 MIN: CPT

## 2023-02-22 ENCOUNTER — APPOINTMENT (OUTPATIENT)
Age: 78
End: 2023-02-22
Payer: MEDICARE

## 2023-02-22 PROCEDURE — 64493 INJ PARAVERT F JNT L/S 1 LEV: CPT | Mod: 50

## 2023-02-22 PROCEDURE — 64494 INJ PARAVERT F JNT L/S 2 LEV: CPT | Mod: 50

## 2023-02-23 NOTE — ADDENDUM
[FreeTextEntry1] : I, Robert Rabago, solely acted as scribe for Dr. Meliton Cruz on 02/21/2023.\par

## 2023-02-23 NOTE — HISTORY OF PRESENT ILLNESS
[FreeTextEntry1] : 78 year old male FU for LUTS and significant proteinuria\par \par on 0.4 flomax, doing well\par reports improved nocturia and overall symptoms\par \par pt states he went to nephrologist-\par unclear name, location\par also does not know  name of new pcp who referred him to the nephrologist\par \par pt notes he did not have 24h urine performed\par \par unclear nature of renal eval performed by neph\par \par IPSS 1/9/23: 28, increased from 7 on 3/22/22\par PVR 1/9/23: 91 cc\par \par PSA 7/10/22 3.81, trending up from 2.75 from 8/31/21\par

## 2023-02-23 NOTE — END OF VISIT
[FreeTextEntry3] : Medical record entries made by the scribe today, were at my direction and personally dictated to them by me, Dr. Meliton Cruz on 02/21/2023. I have reviewed the chart and agree that the record accurately reflects my personal performance of the history, physical exam, assessment, and plan.

## 2023-02-23 NOTE — ASSESSMENT
[FreeTextEntry1] : 78 year old male with LUTS and significant proteinuria\par \par PVR today: 27 cc\par \par continue 0.4 flomax \par \par advised pt to FU with nephrologist regarding proteinuria\par \par FU 3 months for re-evaluation

## 2023-03-03 ENCOUNTER — APPOINTMENT (OUTPATIENT)
Dept: PAIN MANAGEMENT | Facility: CLINIC | Age: 78
End: 2023-03-03
Payer: MEDICARE

## 2023-03-03 VITALS — WEIGHT: 165 LBS | BODY MASS INDEX: 26.52 KG/M2 | HEIGHT: 66 IN

## 2023-03-03 PROCEDURE — 99214 OFFICE O/P EST MOD 30 MIN: CPT

## 2023-03-03 NOTE — HISTORY OF PRESENT ILLNESS
[Lower back] : lower back [6] : 6 [1] : 2 [Dull/Aching] : dull/aching [Radiating] : radiating [Sharp] : sharp [Stabbing] : stabbing [Tingling] : tingling [Intermittent] : intermittent [Leisure] : leisure [Sleep] : sleep [Meds] : meds [Heat] : heat [Standing] : standing [Injection therapy] : injection therapy [Walking] : walking [Bending forward] : bending forward [] : no [FreeTextEntry1] : right hip [FreeTextEntry6] : numbness [FreeTextEntry7] : right leg to b/l foot  [de-identified] : L MRI  [TWNoteComboBox1] : 50%

## 2023-03-03 NOTE — PHYSICAL EXAM
[Flexion] : flexion [de-identified] : Constitutional; Appears well, no apparent distress\par Ability to communicate: Normal \par Respiratory: non-labored breathing\par Skin: No rash noted\par Head: Normocephalic, atraumatic\par Neck: no visible thyroid enlargement\par Eyes: Extraocular movements intact\par Neurologic: Alert and oriented x3\par Psychiatric: normal mood, affect and behavior \par \par  [] : non-antalgic

## 2023-03-03 NOTE — DISCUSSION/SUMMARY
[de-identified] : After discussing various treatment options with the patient including but not limited to oral medications, physical therapy, exercise modalities as well as interventional spinal injections, we have decided with the following plan:\par \par - Continue Home exercises, stretching, activity modification, physical therapy, and conservative care.\par - MRI report and/or images was reviewed and discussed with the patient.\par - Recommend Bilateral L3,4,5 radiofrequency ablation under under fluoroscopic guidance with image.\par - Patient has lumbosacral axial pain that is consistent with facet joint pathology. Two diagnostic blocks of the medial branch nerves with local anesthetic was performed and has resulted in at least a 80% reduction in pain for the duration of the specific local anesthetic. The pain is not radicular. There is no prior spinal fusion surgery at the level targeted.  The pain has failed to respond to three months of conservative therapy.\par - The risks, benefits, contents and alternatives to injection were explained in full to the patient.  Risks outlined include but are not limited to infection, sepsis, bleeding, post-dural puncture headache, nerve damage, temporary increase in pain, syncopal episode, failure to resolve symptoms, allergic reaction, symptom recurrence, and elevation of blood sugar in diabetics. Cortisone may cause immunosuppression.  Patient understands the risks.  All questions were answered.  After discussion of options, patient requested an injection.  Information regarding the injection was given to the patient.  Which medications to stop prior to the injection was explained to the patient as well.\par - Follow up in 1-2 weeks post injection for re-evaluation.\par - Recommend continue Tylenol 1000mg Q8 hours PRN.\par \par

## 2023-03-06 ENCOUNTER — APPOINTMENT (OUTPATIENT)
Dept: ORTHOPEDIC SURGERY | Facility: CLINIC | Age: 78
End: 2023-03-06
Payer: MEDICARE

## 2023-03-06 VITALS — BODY MASS INDEX: 26.52 KG/M2 | HEIGHT: 66 IN | WEIGHT: 165 LBS

## 2023-03-06 DIAGNOSIS — M54.2 CERVICALGIA: ICD-10-CM

## 2023-03-06 PROCEDURE — 99214 OFFICE O/P EST MOD 30 MIN: CPT

## 2023-03-06 PROCEDURE — 72040 X-RAY EXAM NECK SPINE 2-3 VW: CPT

## 2023-03-06 PROCEDURE — 99204 OFFICE O/P NEW MOD 45 MIN: CPT

## 2023-03-06 PROCEDURE — 72100 X-RAY EXAM L-S SPINE 2/3 VWS: CPT

## 2023-03-13 ENCOUNTER — APPOINTMENT (OUTPATIENT)
Dept: ORTHOPEDIC SURGERY | Facility: CLINIC | Age: 78
End: 2023-03-13
Payer: MEDICARE

## 2023-03-13 VITALS
BODY MASS INDEX: 26.52 KG/M2 | DIASTOLIC BLOOD PRESSURE: 88 MMHG | HEIGHT: 66 IN | OXYGEN SATURATION: 98 % | SYSTOLIC BLOOD PRESSURE: 168 MMHG | WEIGHT: 165 LBS | RESPIRATION RATE: 16 BRPM | HEART RATE: 73 BPM

## 2023-03-13 DIAGNOSIS — M54.17 RADICULOPATHY, LUMBOSACRAL REGION: ICD-10-CM

## 2023-03-13 PROCEDURE — 99213 OFFICE O/P EST LOW 20 MIN: CPT

## 2023-03-13 PROCEDURE — 73502 X-RAY EXAM HIP UNI 2-3 VIEWS: CPT

## 2023-03-13 NOTE — DISCUSSION/SUMMARY
[de-identified] : 78-year-old male with chronic low back pain and right lower extremity radiculopathy to the buttock and thigh.  Symptoms around the hip are secondary to muscular weakness and radiculopathy from his spinal condition.  Recommended continued physical therapy and home stretching exercises.  He will follow-up with Dr. Mcgregor after spine imaging.

## 2023-03-13 NOTE — PHYSICAL EXAM
[de-identified] : General: No acute distress\par Mental: Alert and oriented x3\par Eyes: Conjunctivitis not seen\par Chest: Symmetric chest rise, no audible wheezing\par Skin: Bilateral lower extremities absent from rashes and ulcers\par Abdomen: No distention\par \par Right hip: Intact skin, nontender along the greater trochanter, nontender ASIS.  Tenderness at the iliac crest.\par Hip flexion 100, external rotation 70, internal rotation 40.  Negative Stinchfield, negative THOM, negative FADIR.  5/5 hip flexion, abduction.  Distally motor and sensory intact with a 5/5 strength and 2/2 sensation throughout. [de-identified] : Pelvis and right hip x-rays show well-maintained joint space at the bilateral hip joints, no subchondral sclerosis or osteophytes, lumbar hardware.

## 2023-03-13 NOTE — HISTORY OF PRESENT ILLNESS
[de-identified] : 78-year-old male presenting with chronic right hip pain located along the buttock and iliac crest for several years.  He has an extensive history of spine conditions and has undergone 2 separate spine fusion surgeries over 20 years ago.  He also has had multiple epidural spine injections related to chronic back pain and right lower extremity radiculopathy.  He typically has moderate to severe low back pain radiating to the buttock and thigh, worse with prolonged sitting and improved with rest and medication.  He denies any lateral hip pain or groin pain, and does not have worsening hip pain with increased activity.  He started walking with a cane and takes Tylenol for this.  He has seen a pain management specialist for his chronic back pain and will be undergoing a CT and MRI of the lumbar spine to evaluate his fusion and lumbar hardware.  He is here wondering if the hip is contributing to any of his symptoms.  He reports wearing lifts in both his shoes which he was told in the past would help his gait.

## 2023-03-28 ENCOUNTER — APPOINTMENT (OUTPATIENT)
Dept: ORTHOPEDIC SURGERY | Facility: CLINIC | Age: 78
End: 2023-03-28
Payer: MEDICARE

## 2023-03-28 PROCEDURE — 99442: CPT | Mod: 95

## 2023-03-28 NOTE — HISTORY OF PRESENT ILLNESS
[Home] : at home, [unfilled] , at the time of the visit. [Medical Office: ([facility])___] : at the medical office located in  [de-identified] : Symptoms stable, getting injections tomorrow\par \par Prior HPI\par \par Pt  is s/p cervical fusion and s/p L3-S1 laminectomy performed by Dr Meyer in 2020 after falling a flight of concrete stairs at work. .  Pt presents with c/o neck pain exacerbation for the past 1 year and low back pain exacerbation  for the past 10 years progressively worsening over time. Pt denies recent injury/falls. Neck pain radiates occasionally to LUE  to wrist associated with intermittent numbness,tingling on LUE. Low back pain radiates to right thigh and occasionally to ankle and tingling on B/L LE to toes. Pt denies  weakness on B/L UE or LE. Pt takes Tylenol, this provides mild pain relief. Pt B/L L3,L4,L5 MBB on 02/22/2023 with >80% relief, b/l L3,4,5 MBB on 12/28/22 with >80% relief , L2-3 LESI on 7/20/22 with 90% relief and L2-3 LESI on 7/20/22 with 90% relief and s/p L2-3 LESI on 8/11/21 with 60% relief , s/p L2-3 LESI on 8/11/21 with 60%, These ARNALDO provided pain relief for 3-4 weeks . Pt f/u with Dr Coon (pain mgt).  Pt does not participate with PT at this time, completed PT in 2020, this provided no pain relief. Pt denies fever, chills, weight changes, loss of bladder control, bowel incontinence or urinary retention or saddle anesthesia\par The patient's past medical history, past surgical history, medications, allergies, and social history were reviewed by me today with the patient and documented accordingly. In addition, the patient's family history, which is noncontributory to this visit, was also reviewed.\par \par \par  [Bending] : worsened by bending [Lifting] : worsened by lifting [Prolonged Sitting] : worsened by prolonged sitting [Prolonged Standing] : worsened by prolonged standing [Walking] : not worsened by walking [Acetaminophen] : relieved by acetaminophen [Physical Therapy] : not relieved by physical therapy [Recumbency] : relieved by recumbency [Rest] : relieved by rest [Ataxia] : no ataxia [Incontinence] : no incontinence [Loss of Dexterity] : good dexterity [Urinary Ret.] : no urinary retention [de-identified] : moving head side to side exacerbates pain  [de-identified] : ARNALDO provides some pain relief

## 2023-03-28 NOTE — DISCUSSION/SUMMARY
[de-identified] : 77 yo male with lumbar stenosis L23, with L34 pseudarthrosis, with R>L radiculopathy this may be related to L23 region, SI joint , next visit one month, review imaging and injection results. \par \par Diagnosis, prognosis, natural history and treatment was discussed with patient. Patient was advised if the following symptoms develop: chills, fever, \par loss of bladder control, bowel incontinence or urinary retention, numbness/tingling or weakness is present in upper or lower extremities, to go to the nearest emergency room. This may be a new clinical condition not present at the time of the patient visit  that may lead to paralysis and/or death, Patient advised if the above symptoms developed to also call the office immediately to inform us and to go to the nearest emergency room.\par

## 2023-03-28 NOTE — PHYSICAL EXAM
[Blanco's Sign] : negative Blanco's sign [Pronator Drift] : negative pronator drift [SLR] : negative straight leg raise [] : Motor: [Motor Strength Upper Extremities] : left (weak) [NL] : normal and symmetric bilaterally [Motor Strength Lower Extremities] : left (weak) [UE/LE] : Sensory: Intact in bilateral upper & lower extremities [0] : left ankle jerk 0 [ALL] : dorsalis pedis, posterior tibial, femoral, popliteal, and radial 2+ and symmetric bilaterally [Normal] : Oriented to person, place, and time, insight and judgement were intact and the affect was normal [de-identified] : Prio PE\par \par Cervical ROM: Limited, painful\par Lumbar ROM: Limited, painful\par TTP C/L spine, b/l paracervicals and b/l paraspinals L region. \par Skin intact C/L spine. No rashes, ulcers, blisters. \par No lymphedema. \par Rapid alternating movements- Limited due to pain. \par Limited and painful ROM RUE, LUE, RLE, and LLE. Skin intact RUE, LUE, RLE, and LLE. No rashes, blisters, ulcers. NTTP RUE, LUE, RLE, and LLE. No evidence of dislocation or subluxation B/L\par \par THOM right\par Cervical and lumbar incision well healed\par  [de-identified] : 2 views AP and Lat of Cervical Spine from occipital to C7/T1. Read and dictated by Dr. Waldemar Mcgregor Board Certified Orthopaedic surgeon  and 2 views AP and Lat of LS Spine from C47-Oreslt 03/06/23. Read and dictated by Dr. Waldemar Mcgregor Board Certified orthopaedic surgeon ACDF/PLF pseudarthrosis L34\par \par MRI Lumbar spine [facility] radiology 10/269/22:\par EXAM:  MRI LUMBAR SPINE WITHOUT CONTRAST\par \par HISTORY: Lower back pain with bilateral radiculopathy.\par \par TECHNIQUE: Multiplanar, multi-sequential MRI of the lumbar spine was obtained on a 3T scanner using a standard protocol.\par \par COMPARISON:  Lumbar spine MRI dated 6/2/2014.\par \par FINDINGS:\par Redemonstration of transitional vertebra with sacralization of L5 and hypoplastic L5-S1 disc space, a known variant. For purposes of this dictation, the last hypoplastic disc space will be labeled L5-S1, in keeping with prior L-spine MRI.\par \par POSTSURGICAL CHANGES: Redemonstration of posterior surgical fusion hardware at L2 through L5 and intervertebral disc spacers at L3-L4 and L4-L5. Hardware results in metallic susceptibility artifact decreases sensitivity for full evaluation. In addition, there is remonstration of posterior surgical decompression at L2-L3 through L4-L5 with laminectomies. Chronic interbody bony fusion L3-L4 and L4-5.\par \par OSSEOUS STRUCTURES: Vertebral body heights are preserved.. No marrow edema or destructive marrow infiltrative process. Diffuse fatty bone marrow signal is again noted, compatible with osteopenia. Chronic endplate spondylosis at L1-L2 and a lesser extent at L2-L3. Chronic multilevel facet joint hypertrophy.\par \par ALIGNMENT: Normal lumbar lordosis is preserved. No significant scoliosis. Chronic minimal retrolisthesis of L1 upon L2. No spondylolysis within the limitations of MRI and susceptibility artifact.\par \par SPINAL CORD AND CONUS: The conus is normal in signal and terminates at the level of T11-T12. There is no conus compression.\par \par PARASPINAL AND INTRA-ABDOMINAL SOFT TISSUES: There is partially imaged marked prostamegaly. Redemonstration of multiple bilateral renal cysts with some being partially imaged in largest in the left kidney measuring up to 6.0 cm. 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, a known variant.\par \par INCLUDED THORACIC SPINE AND SACRUM: Chronic mild degenerative endplate irregularity and small anterior endplate osteophytes at T11-12.\par \par DISCS: Chronic moderate-severe disc height loss at L1-L2 and L2-L3.\par \par The following axial levels are imaged and detailed below:\par \par L1-L2: Chronic minimal retrolisthesis, circumferential osteophyte/disc complex, facet joint hypertrophy and ligamentum flavum redundancy resulting in moderate spinal canal stenosis with impression upon the ventral descending nerve roots and moderate-severe bilateral foraminal stenosis. \par \par L2-L3: Redemonstration of posterior surgical decompression without spinal canal stenosis. Chronic small circumferential osteophyte/disc complex and facet joint hypertrophy resulting in minimal impression upon the ventral thecal sac and mild bilateral foraminal stenosis.\par \par L3-L4: Redemonstration of posterior surgical decompression without spinal canal stenosis. Chronic endplate bony ridge and facet joint hypertrophy resulting in mild bilateral foraminal stenosis.\par \par L4-L5: Redemonstration of posterior surgical decompression without spinal canal stenosis. Chronic endplate bony ridge and facet joint hypertrophy resulting in mild bilateral foraminal stenosis.\par \par L5-S1: No disc bulging or herniation. No spinal canal or foraminal stenosis.\par \par IMPRESSION:  \par No significant change since L-spine MRI of 6/2/14:\par Preserved lumbar vertebral body heights without acute bony pathology.\par \par Surgical fusion hardware at L2-L5, resulting in susceptibility artifact. Posterior surgical decompression at L2-L3 through L4-L5.\par \par Minimal retrolisthesis of L1 upon L2.\par \par L1-L2: Moderate spinal canal stenosis with impression upon the ventral descending nerve roots and moderate-severe bilateral foraminal stenosis.\par \par L2-L3: Minimal impression upon the ventral thecal sac and mild bilateral foraminal stenosis.\par \par L3-L4 and L4-L5: Mild bilateral foraminal stenosis.\par \par Moderate-severe L1-L2 and L2-L3 degenerative disc disease.\par \par Moderate L1-L2 and mild at L2-L3 endplate spondylosis. Multilevel facet joint hypertrophy.\par \par Osteopenia.\par \par \par MRI C spine [facility] 2018: \par MRI-3T CERVICAL SPINE NON CONTRAST\par HISTORY: M54.2 Cervical/ Neck Pain\par COMPARISON: None available.\par TECHNIQUE: MRI of the cervical spine was performed with multiple sequences in sagittal and\par axial planes on a Kaylee 3.0 ultra high-field wide-bore scanner.\par FINDINGS:\par Postoperative: Status post C3 through C7 anterior cervical discectomy and fusion with\par apparent osseous fusion across the majority of the instrumented disc spaces.\par Vertebral body heights: Maintained.\par Alignment: Loss of normal cervical lordosis. Trace C7 on T1 anterolisthesis.\par Marrow signal: Preserved.\par The spinal cord: No definite cord signal abnormality.\par The visualized posterior fossa: Unremarkable.\par Paravertebral soft tissues: Unremarkable.\par C2-3: There is a disc osteophyte complex resulting in mild spinal canal and moderate to\par marked bilateral foraminal stenosis with likely impingement of both C3 nerve roots.\par C3-4: Uncovertebral and facet arthropathy contribute to mild right foraminal narrowing. No\par significant spinal canal stenosis.\par C4-5: No significant canal or foraminal stenosis.\par C5-6: There is posterior osteophytic ridging contributing to mild to moderate spinal canal\par stenosis. Uncovertebral and facet arthropathy contribute to moderate to marked bilateral\par foraminal stenosis.\par C6-7: Uncovertebral and facet arthropathy contribute to marked right and moderate left\par foraminal stenosis. No significant spinal canal stenosis.\par C7-T1: Uncovertebral and facet arthropathy contribute to mild to moderate bilateral\par foraminal stenosis.\par Page 2 of 2\par IMPRESSION:\par C3-C7 anterior cervical discectomy and fusion without cord compression and with at least\par moderate stenosis of several neural foramina as detailed above. Z98.1\par Degenerative disc disease at C2-C3 resulting in likely impingement of bilateral C3 nerve\par roots. M50.31\par Trace anterolisthesis and facet arthropathy at C7-T1 resulting in mild to moderate\par bilateral foraminal stenosis.M50.33\par \par MRI T spine [facility]\"" 2018: \par \par MRI-3T THORACIC SPINE NON CONTRAST\par HISTORY: M54.6 Thoracic Pain\par COMPARISON: None available.\par TECHNIQUE: MRI of the thoracic spine was performed with multiple sequences performed in\par sagittal and axial planes on a Kaylee 3.0 ultra high-field wide-bore scanner.\par FINDINGS:\par Discs: There is multilevel disc desiccation and height loss.\par T1-2: There is a shallow disc bulge and facet arthropathy resulting in moderate left\par foraminal stenosis.\par T3-4: There is a disc bulge without significant stenosis.\par T4-5: There is a minimal disc bulge and the facet arthropathy resulting in mild right\par foraminal stenosis.\par T5-6: There is a disc osteophyte complex and facet arthropathy resulting in mild right\par foraminal stenosis.\par T6-7: There is a disc osteophyte complex and facet arthropathy resulting in mild left\par foraminal stenosis.\par T7-8: There is a disc bulge with a left-sided disc herniation and facet arthropathy\par resulting in mild spinal canal and moderate left foraminal stenosis.\par T8-9: There is a disc osteophyte complex and facet arthropathy resulting in mild spinal\par canal and mild to moderate bilateral foraminal stenosis.\par T9-T10: There is a minimal disc bulge and facet arthropathy resulting in mild spinal canal\par and mild bilateral foraminal stenosis.\par T10-11: There is partial osseous fusion across the disc space. There is facet arthropathy\par contributing to mild bilateral foraminal stenosis.\par T11-12: There is a disc osteophyte complex with Schmorl's nodes and Modic type I changes.\par Vertebral body heights: Maintained\par Alignment: Preserved.\par The spinal cord: Normal in signal and caliber.\par Paravertebral soft tissues: Incompletely imaged is a probable left renal cyst.\par IMPRESSION:\par Multilevel degenerative disc disease as detailed above without high-grade canal\par stenosis/significant cord compression. M51.34

## 2023-03-29 ENCOUNTER — APPOINTMENT (OUTPATIENT)
Age: 78
End: 2023-03-29
Payer: MEDICARE

## 2023-03-29 PROCEDURE — 64636 DESTROY L/S FACET JNT ADDL: CPT | Mod: 50,59

## 2023-03-29 PROCEDURE — 64635 DESTROY LUMB/SAC FACET JNT: CPT | Mod: 50

## 2023-03-29 NOTE — PROGRESS NOTE ADULT - SUBJECTIVE AND OBJECTIVE BOX
Patient is 77y y/o Male s/p L TKA POD#0  Patient is seen and examined at bedside.   Pt tolerated procedure well without any intra-op complications.    Pain is controlled.  Denies CP/SOB/Dizziness/N/V/D/HA.     Vital Signs Last 24 Hrs  T(C): 36.8 (23 Feb 2022 18:24), Max: 36.9 (23 Feb 2022 12:23)  T(F): 98.3 (23 Feb 2022 18:24), Max: 98.5 (23 Feb 2022 12:23)  HR: 66 (23 Feb 2022 18:24) (62 - 79)  BP: 144/85 (23 Feb 2022 18:24) (121/72 - 159/82)  BP(mean): --  RR: 16 (23 Feb 2022 18:24) (12 - 18)  SpO2: 96% (23 Feb 2022 18:24) (96% - 98%)      PHYSICAL EXAM:  General: A&Ox3 NAD  LLE: Dressing C/D/I with ACE wrap in place. Motor intact + EHL/FHL/TA/GS.  Sensation is grossly intact.  Extremity warm, compartments soft, compressible. No calf tenderness. DP 2+   RLE: Motor intact +EHL/FHL/TA/GS. Sensation is grossly intact. Extremity warm, compartments soft, compressible. No calf tenderness. DP2+              A/P: Patient is a 77y y/o Male s/p L TKA, POD # 0  -wound care, knee extension/leg elevation, cryocuff, isometric exercises, new medications reviewed with pt  -Pain control/analgesia: Tramadol  -Inc spirometry reviewed with pt, demonstrated competence  -DVT prophylaxis with Venodynes/Aspirin 81 BID  -F/U AM Labs  -PT/OT/WBAT  -prophylactic Antibiotic  -medical consult  -DC planning    
DHRUV DONOHUE is a 77y Male s/p LEFT TOTAL KNEE REPLACEMENT        denies any chest pain shortness of breath palpitation dizziness lightheadedness nausea vomiting fever or chills    T(C): 36.7 (02-25-22 @ 05:56), Max: 36.7 (02-24-22 @ 12:21)  HR: 85 (02-25-22 @ 05:56) (72 - 85)  BP: 150/75 (02-25-22 @ 05:56) (139/77 - 152/69)  RR: 16 (02-25-22 @ 05:56) (16 - 17)  SpO2: 96% (02-25-22 @ 05:56) (95% - 98%)  no jvd/bruit  s1 s2 rrr  cta  s/nt/nd  no calf tend                        13.9   10.32 )-----------( 229      ( 25 Feb 2022 06:40 )             43.1   02-25    139  |  105  |  19  ----------------------------<  114<H>  3.7   |  26  |  1.12    Ca    8.7      25 Feb 2022 06:40        cont dvt px  pain control  bowel regimen  antiemetics  incentive spirometer
Patient is seen and examined at bedside. Denies CP/SOB/Dizziness/N/V/D/HA. Pain is controlled.     Vital Signs Last 24 Hrs  T(C): 36.7 (25 Feb 2022 05:56), Max: 36.7 (24 Feb 2022 12:21)  T(F): 98 (25 Feb 2022 05:56), Max: 98.1 (24 Feb 2022 12:21)  HR: 85 (25 Feb 2022 05:56) (72 - 85)  BP: 150/75 (25 Feb 2022 05:56) (139/77 - 152/69)  BP(mean): --  RR: 16 (25 Feb 2022 05:56) (16 - 17)  SpO2: 96% (25 Feb 2022 05:56) (95% - 98%)      PHYSICAL EXAM:  General: NAD, WDWN.   Neuro:  Alert & responsive  HEENT: NCAT, EOMI, conjunctiva clear  abd: soft, NT/ND  Right LE: Motor intact + EHL/FHL/TA/GS.  Sensation is grossly intact.  Extremity warm, compartments soft, compressible. No calf tenderness. DP 2+   Left LE: ARABELLA dressing C/D/I. Battery flashing green/OK. Motor intact +EHL/FHL/TA/GS. Sensation is grossly intact. Extremity warm, compartments soft, compressible. No calf tenderness. DP2+    Labs:                          13.9   10.32 )-----------( 229      ( 25 Feb 2022 06:40 )             43.1       02-25    139  |  105  |  19  ----------------------------<  114<H>  3.7   |  26  |  1.12    Ca    8.7      25 Feb 2022 06:40        A/P: Patient is a 77y y/o Male s/p left TKA, POD # 2  -wound care, knee extension/leg elevation, cryocuff, isometric exercises, new medications reviewed with pt  -Pain control/analgesia: Controlled with oxycodone  -Inc spirometry reviewed with pt, demonstrated competence  -DVT prophylaxis with Venodynes/Aspirin 81mg BID  -TIM: Resolved.   -PT/OT/WBAT  -DC planning: for home today   -D/W DR Daugherty  
Patient is seen and examined at bedside. Denies CP/SOB/Dizziness/N/V/D/HA. Pain is controlled. C/O hiccups. Has 3 days of hiccups after every spinal procedure.     Vital Signs Last 24 Hrs  T(C): 36.9 (24 Feb 2022 05:00), Max: 37 (23 Feb 2022 19:24)  T(F): 98.5 (24 Feb 2022 05:00), Max: 98.6 (23 Feb 2022 19:24)  HR: 73 (24 Feb 2022 05:00) (62 - 91)  BP: 157/76 (24 Feb 2022 05:00) (121/72 - 159/88)  BP(mean): --  RR: 16 (24 Feb 2022 05:00) (12 - 18)  SpO2: 98% (24 Feb 2022 05:00) (95% - 98%)      PHYSICAL EXAM:  General: NAD, WDWN.   Neuro:  Alert & responsive  HEENT: NCAT, EOMI, conjunctiva clear  abd: soft, NT/ND  Right LE: Motor intact + EHL/FHL/TA/GS.  Sensation is grossly intact.  Extremity warm, compartments soft, compressible. No calf tenderness. DP 2+   Left LE: ARABELLA dressing C/D/I. Battery flashing green/ok. Motor intact +EHL/FHL/TA/GS. Sensation is grossly intact. Extremity warm, compartments soft, compressible. No calf tenderness. DP2+    Labs:                          13.7   11.99 )-----------( 217      ( 24 Feb 2022 06:24 )             41.4       02-24    138  |  106  |  25<H>  ----------------------------<  108<H>  3.9   |  25  |  1.65<H>    Ca    8.7      24 Feb 2022 06:24        A/P: Patient is a 77y y/o Male s/p left KA, POD # 1  -wound care, knee extension/leg elevation, cryocuff, isometric exercises, new medications reviewed with pt  -Pain control/analgesia reviewed  -Monitor hiccups, no intervention at this time  -Inc spirometry reviewed with pt, demonstrated competence  -DVT prophylaxis with Venodynes/Aspirin 81mg BID  -TIM: NS bolus. Encourage PO fluid intake. IVF. Celebrex DC'd. Will F/U repeat SMA later today.   -PT/OT/WBAT  -medical consult reviewed   -DC planning: pending improved kidney function     
no

## 2023-04-18 ENCOUNTER — APPOINTMENT (OUTPATIENT)
Dept: UROLOGY | Facility: CLINIC | Age: 78
End: 2023-04-18
Payer: MEDICARE

## 2023-04-18 VITALS
HEART RATE: 68 BPM | WEIGHT: 156 LBS | SYSTOLIC BLOOD PRESSURE: 151 MMHG | OXYGEN SATURATION: 98 % | TEMPERATURE: 97.5 F | DIASTOLIC BLOOD PRESSURE: 79 MMHG | BODY MASS INDEX: 25.18 KG/M2

## 2023-04-18 DIAGNOSIS — R35.1 NOCTURIA: ICD-10-CM

## 2023-04-18 DIAGNOSIS — R79.89 OTHER SPECIFIED ABNORMAL FINDINGS OF BLOOD CHEMISTRY: ICD-10-CM

## 2023-04-18 PROCEDURE — 99213 OFFICE O/P EST LOW 20 MIN: CPT

## 2023-04-19 ENCOUNTER — APPOINTMENT (OUTPATIENT)
Dept: ORTHOPEDIC SURGERY | Facility: CLINIC | Age: 78
End: 2023-04-19
Payer: MEDICARE

## 2023-04-19 VITALS — WEIGHT: 156 LBS | HEIGHT: 66 IN | BODY MASS INDEX: 25.07 KG/M2

## 2023-04-19 DIAGNOSIS — M48.07 SPINAL STENOSIS, LUMBOSACRAL REGION: ICD-10-CM

## 2023-04-19 PROCEDURE — 99215 OFFICE O/P EST HI 40 MIN: CPT

## 2023-04-21 ENCOUNTER — APPOINTMENT (OUTPATIENT)
Dept: PAIN MANAGEMENT | Facility: CLINIC | Age: 78
End: 2023-04-21
Payer: MEDICARE

## 2023-04-21 VITALS — HEIGHT: 66 IN | WEIGHT: 156 LBS | BODY MASS INDEX: 25.07 KG/M2

## 2023-04-21 PROCEDURE — 99213 OFFICE O/P EST LOW 20 MIN: CPT

## 2023-04-21 NOTE — HISTORY OF PRESENT ILLNESS
[Lower back] : lower back [6] : 6 [1] : 2 [Dull/Aching] : dull/aching [Radiating] : radiating [Sharp] : sharp [Stabbing] : stabbing [Tingling] : tingling [Intermittent] : intermittent [Leisure] : leisure [Sleep] : sleep [Meds] : meds [Heat] : heat [Standing] : standing [Injection therapy] : injection therapy [Walking] : walking [Bending forward] : bending forward [] : no [FreeTextEntry1] : right hip [FreeTextEntry6] : numbness [FreeTextEntry7] : right leg to b/l foot  [de-identified] : L MRI  [TWNoteComboBox1] : 50%

## 2023-04-21 NOTE — HISTORY OF PRESENT ILLNESS
[Lower back] : lower back [6] : 6 [1] : 2 [Dull/Aching] : dull/aching [Radiating] : radiating [Sharp] : sharp [Stabbing] : stabbing [Tingling] : tingling [Intermittent] : intermittent [Leisure] : leisure [Sleep] : sleep [Meds] : meds [Heat] : heat [Standing] : standing [Injection therapy] : injection therapy [Walking] : walking [Bending forward] : bending forward [] : no [FreeTextEntry1] : right hip [FreeTextEntry6] : numbness [FreeTextEntry7] : right leg to b/l foot  [de-identified] : L MRI  [TWNoteComboBox1] : 50%

## 2023-04-21 NOTE — PHYSICAL EXAM
[Flexion] : flexion [de-identified] : Constitutional; Appears well, no apparent distress\par Ability to communicate: Normal \par Respiratory: non-labored breathing\par Skin: No rash noted\par Head: Normocephalic, atraumatic\par Neck: no visible thyroid enlargement\par Eyes: Extraocular movements intact\par Neurologic: Alert and oriented x3\par Psychiatric: normal mood, affect and behavior \par \par  [] : non-antalgic

## 2023-04-21 NOTE — DISCUSSION/SUMMARY
[de-identified] : After discussing various treatment options with the patient including but not limited to oral medications, physical therapy, exercise modalities as well as interventional spinal injections, we have decided with the following plan:\par \par - Continue home exercises, stretching, activity modification, physical therapy, and conservative care.\par - Follow-up as needed.\par - Will provide prescription for Physical Therapy.\par \par

## 2023-04-21 NOTE — DISCUSSION/SUMMARY
[de-identified] : After discussing various treatment options with the patient including but not limited to oral medications, physical therapy, exercise modalities as well as interventional spinal injections, we have decided with the following plan:\par \par - Continue home exercises, stretching, activity modification, physical therapy, and conservative care.\par - Follow-up as needed.\par - Will provide prescription for Physical Therapy.\par \par

## 2023-04-21 NOTE — PHYSICAL EXAM
[Flexion] : flexion [de-identified] : Constitutional; Appears well, no apparent distress\par Ability to communicate: Normal \par Respiratory: non-labored breathing\par Skin: No rash noted\par Head: Normocephalic, atraumatic\par Neck: no visible thyroid enlargement\par Eyes: Extraocular movements intact\par Neurologic: Alert and oriented x3\par Psychiatric: normal mood, affect and behavior \par \par  [] : non-antalgic

## 2023-05-04 ENCOUNTER — OUTPATIENT (OUTPATIENT)
Dept: OUTPATIENT SERVICES | Facility: HOSPITAL | Age: 78
LOS: 1 days | End: 2023-05-04
Payer: MEDICARE

## 2023-05-04 VITALS
OXYGEN SATURATION: 96 % | HEIGHT: 66 IN | SYSTOLIC BLOOD PRESSURE: 140 MMHG | HEART RATE: 69 BPM | WEIGHT: 164.46 LBS | DIASTOLIC BLOOD PRESSURE: 85 MMHG | TEMPERATURE: 98 F | RESPIRATION RATE: 14 BRPM

## 2023-05-04 DIAGNOSIS — Z98.890 OTHER SPECIFIED POSTPROCEDURAL STATES: Chronic | ICD-10-CM

## 2023-05-04 DIAGNOSIS — M48.07 SPINAL STENOSIS, LUMBOSACRAL REGION: ICD-10-CM

## 2023-05-04 DIAGNOSIS — Z98.49 CATARACT EXTRACTION STATUS, UNSPECIFIED EYE: Chronic | ICD-10-CM

## 2023-05-04 DIAGNOSIS — Z96.652 PRESENCE OF LEFT ARTIFICIAL KNEE JOINT: Chronic | ICD-10-CM

## 2023-05-04 DIAGNOSIS — Z29.9 ENCOUNTER FOR PROPHYLACTIC MEASURES, UNSPECIFIED: ICD-10-CM

## 2023-05-04 DIAGNOSIS — Z01.818 ENCOUNTER FOR OTHER PREPROCEDURAL EXAMINATION: ICD-10-CM

## 2023-05-04 DIAGNOSIS — M51.36 OTHER INTERVERTEBRAL DISC DEGENERATION, LUMBAR REGION: ICD-10-CM

## 2023-05-04 DIAGNOSIS — Z96.651 PRESENCE OF RIGHT ARTIFICIAL KNEE JOINT: Chronic | ICD-10-CM

## 2023-05-04 DIAGNOSIS — Z98.1 ARTHRODESIS STATUS: Chronic | ICD-10-CM

## 2023-05-04 DIAGNOSIS — Z87.438 PERSONAL HISTORY OF OTHER DISEASES OF MALE GENITAL ORGANS: Chronic | ICD-10-CM

## 2023-05-04 LAB
ANION GAP SERPL CALC-SCNC: 16 MMOL/L — SIGNIFICANT CHANGE UP (ref 5–17)
BLD GP AB SCN SERPL QL: NEGATIVE — SIGNIFICANT CHANGE UP
BUN SERPL-MCNC: 20 MG/DL — SIGNIFICANT CHANGE UP (ref 7–23)
CALCIUM SERPL-MCNC: 9.9 MG/DL — SIGNIFICANT CHANGE UP (ref 8.4–10.5)
CHLORIDE SERPL-SCNC: 104 MMOL/L — SIGNIFICANT CHANGE UP (ref 96–108)
CO2 SERPL-SCNC: 25 MMOL/L — SIGNIFICANT CHANGE UP (ref 22–31)
CREAT SERPL-MCNC: 1.41 MG/DL — HIGH (ref 0.5–1.3)
EGFR: 51 ML/MIN/1.73M2 — LOW
GLUCOSE SERPL-MCNC: 83 MG/DL — SIGNIFICANT CHANGE UP (ref 70–99)
HCT VFR BLD CALC: 50.5 % — HIGH (ref 39–50)
HGB BLD-MCNC: 16.7 G/DL — SIGNIFICANT CHANGE UP (ref 13–17)
MCHC RBC-ENTMCNC: 30.9 PG — SIGNIFICANT CHANGE UP (ref 27–34)
MCHC RBC-ENTMCNC: 33.1 GM/DL — SIGNIFICANT CHANGE UP (ref 32–36)
MCV RBC AUTO: 93.5 FL — SIGNIFICANT CHANGE UP (ref 80–100)
MRSA PCR RESULT.: SIGNIFICANT CHANGE UP
NRBC # BLD: 0 /100 WBCS — SIGNIFICANT CHANGE UP (ref 0–0)
PLATELET # BLD AUTO: 220 K/UL — SIGNIFICANT CHANGE UP (ref 150–400)
POTASSIUM SERPL-MCNC: 3.7 MMOL/L — SIGNIFICANT CHANGE UP (ref 3.5–5.3)
POTASSIUM SERPL-SCNC: 3.7 MMOL/L — SIGNIFICANT CHANGE UP (ref 3.5–5.3)
RBC # BLD: 5.4 M/UL — SIGNIFICANT CHANGE UP (ref 4.2–5.8)
RBC # FLD: 14.3 % — SIGNIFICANT CHANGE UP (ref 10.3–14.5)
RH IG SCN BLD-IMP: POSITIVE — SIGNIFICANT CHANGE UP
S AUREUS DNA NOSE QL NAA+PROBE: SIGNIFICANT CHANGE UP
SODIUM SERPL-SCNC: 145 MMOL/L — SIGNIFICANT CHANGE UP (ref 135–145)
WBC # BLD: 8.05 K/UL — SIGNIFICANT CHANGE UP (ref 3.8–10.5)
WBC # FLD AUTO: 8.05 K/UL — SIGNIFICANT CHANGE UP (ref 3.8–10.5)

## 2023-05-04 PROCEDURE — G0463: CPT

## 2023-05-04 PROCEDURE — 87640 STAPH A DNA AMP PROBE: CPT

## 2023-05-04 PROCEDURE — 86850 RBC ANTIBODY SCREEN: CPT

## 2023-05-04 PROCEDURE — 87641 MR-STAPH DNA AMP PROBE: CPT

## 2023-05-04 PROCEDURE — 80048 BASIC METABOLIC PNL TOTAL CA: CPT

## 2023-05-04 PROCEDURE — 86901 BLOOD TYPING SEROLOGIC RH(D): CPT

## 2023-05-04 PROCEDURE — 85027 COMPLETE CBC AUTOMATED: CPT

## 2023-05-04 PROCEDURE — 86900 BLOOD TYPING SEROLOGIC ABO: CPT

## 2023-05-04 RX ORDER — MONTELUKAST 4 MG/1
1 TABLET, CHEWABLE ORAL
Qty: 0 | Refills: 0 | DISCHARGE

## 2023-05-04 RX ORDER — ROSUVASTATIN CALCIUM 5 MG/1
0 TABLET ORAL
Qty: 90 | Refills: 0 | DISCHARGE

## 2023-05-04 RX ORDER — OMEPRAZOLE 10 MG/1
1 CAPSULE, DELAYED RELEASE ORAL
Qty: 0 | Refills: 0 | DISCHARGE

## 2023-05-04 RX ORDER — ROSUVASTATIN CALCIUM 5 MG/1
0 TABLET ORAL
Qty: 0 | Refills: 0 | DISCHARGE

## 2023-05-04 RX ORDER — FAMOTIDINE 10 MG/ML
1 INJECTION INTRAVENOUS
Qty: 0 | Refills: 0 | DISCHARGE

## 2023-05-04 RX ORDER — ALBUTEROL 90 UG/1
2 AEROSOL, METERED ORAL
Qty: 0 | Refills: 0 | DISCHARGE

## 2023-05-04 RX ORDER — FLUTICASONE FUROATE AND VILANTEROL TRIFENATATE 100; 25 UG/1; UG/1
1 POWDER RESPIRATORY (INHALATION)
Qty: 0 | Refills: 0 | DISCHARGE

## 2023-05-04 RX ORDER — CHLORTHALIDONE 50 MG
1 TABLET ORAL
Qty: 0 | Refills: 0 | DISCHARGE

## 2023-05-04 NOTE — H&P PST ADULT - NS HP PST LATEX ALLERGY
Therapy Activity Session  Performed by Rehab Aide staff     TEP: AcuteTherapy Extention Program, activity plan was established by a licensed therapist and performed under the guidance of a licensed therapist.      Pt seen on 2W nursing unit    Availability:  Patient available and per RN report, able to particiate.     Tolerance/Participation  Tolerated well, no shortness of breath or signs of discomfort.    SESSION  Occupational Therapy Exercises    OT Task 1: horizontal abduction shoulder pull aparts  OT Reps for Task 1: 10  OT Sets for Task 1: 1  OT Resistance for Task 1: yellow       OT Task 2: shoulder flexion  OT Reps for Task 2: 10  OT Sets for Task 2: 1  OT Resistance for Task 2: yellow       OT Task 3: shoulder diagonals  OT Reps for Task 3: 10  OT Sets for Task 3: 1  OT Resistance for Task 3: yellow       OT Task 4: elbow flexion  OT Reps for Task 4: 10  OT Sets for Task 4: 1  OT Resistance for Task 4: yellow       OT Task 5: elbow extension  OT Reps for Task 5: 10  OT Sets for Task 5: 1  OT Resistance for Task 5: yellow        No

## 2023-05-04 NOTE — H&P PST ADULT - MUSCULOSKELETAL
details… low back/no calf tenderness/decreased ROM due to pain low back/no calf tenderness/decreased ROM due to pain/strength 5/5 bilateral upper extremities

## 2023-05-04 NOTE — H&P PST ADULT - NSICDXPASTSURGICALHX_GEN_ALL_CORE_FT
PAST SURGICAL HISTORY:  H/O cervical spine surgery     H/O colonoscopy 2015    H/O spinal fusion 2000  rods cervical and lumbar spine    H/O total knee replacement, right 2021    History of BPH     History of cataract surgery (Bilateral eyes)    History of tonsillectomy (Age 12)    S/P carotid endarterectomy (Right, 2/2018)    S/P total knee replacement, left

## 2023-05-04 NOTE — H&P PST ADULT - LAST ECHOCARDIOGRAM
patient states april 2022 [facility] patient states done april 2022 [facility]

## 2023-05-04 NOTE — H&P PST ADULT - ASSESSMENT
upper and lower dentures     APRINI VTE 2.0 SCORE [CLOT updated 2019]    AGE RELATED RISK FACTORS                                                       MOBILITY RELATED FACTORS  [ ] Age 41-60 years                                            (1 Point)                    [ ] Bed rest                                                        (1 Point)  [ ] Age: 61-74 years                                           (2 Points)                  [ ] Plaster cast                                                   (2 Points)  [x] Age= 75 years                                              (3 Points)                    [ ] Bed bound for more than 72 hours                 (2 Points)    DISEASE RELATED RISK FACTORS                                               GENDER SPECIFIC FACTORS  [ ] Edema in the lower extremities                       (1 Point)              [ ] Pregnancy                                                     (1 Point)  [ ] Varicose veins                                               (1 Point)                     [ ] Post-partum < 6 weeks                                   (1 Point)             [x] BMI > 25 Kg/m2                                            (1 Point)                     [ ] Hormonal therapy  or oral contraception          (1 Point)                 [ ] Sepsis (in the previous month)                        (1 Point)               [ ] History of pregnancy complications                 (1 point)  [ ] Pneumonia or serious lung disease                                               [ ] Unexplained or recurrent                     (1 Point)           (in the previous month)                               (1 Point)  [ ] Abnormal pulmonary function test                     (1 Point)                 SURGERY RELATED RISK FACTORS  [ ] Acute myocardial infarction                              (1 Point)               [ ]  Section                                             (1 Point)  [ ] Congestive heart failure (in the previous month)  (1 Point)      [ ] Minor surgery                                                  (1 Point)   [ ] Inflammatory bowel disease                             (1 Point)               [ ] Arthroscopic surgery                                        (2 Points)  [ ] Central venous access                                      (2 Points)                [x] General surgery lasting more than 45 minutes (2 points)  [ ] Malignancy- Present or previous                   (2 Points)                [ ] Elective arthroplasty                                         (5 points)    [ ] Stroke (in the previous month)                          (5 Points)                                                                                                                                                           HEMATOLOGY RELATED FACTORS                                                 TRAUMA RELATED RISK FACTORS  [ ] Prior episodes of VTE                                     (3 Points)                [ ] Fracture of the hip, pelvis, or leg                       (5 Points)  [ ] Positive family history for VTE                         (3 Points)             [ ] Acute spinal cord injury (in the previous month)  (5 Points)  [ ] Prothrombin 11049 A                                     (3 Points)               [ ] Paralysis  (less than 1 month)                             (5 Points)  [ ] Factor V Leiden                                             (3 Points)                  [ ] Multiple Trauma within 1 month                        (5 Points)  [ ] Lupus anticoagulants                                     (3 Points)                                                           [ ] Anticardiolipin antibodies                               (3 Points)                                                       [ ] High homocysteine in the blood                      (3 Points)                                             [ ] Other congenital or acquired thrombophilia      (3 Points)                                                [ ] Heparin induced thrombocytopenia                  (3 Points)                                     Total Score [ 6  ]

## 2023-05-04 NOTE — H&P PST ADULT - REASON FOR ADMISSION
' I am having a spinal fusion  GOC: ' to relieve the pain going down both my legs ' ' I am having a spinal fusion  GOC: ' to be free of leg pain '

## 2023-05-04 NOTE — H&P PST ADULT - RESPIRATORY
clear to auscultation bilaterally/no wheezes/no use of accessory muscles/breath sounds equal/good air movement/respirations non-labored no wheezes/no use of accessory muscles/breath sounds equal/good air movement/respirations non-labored

## 2023-05-04 NOTE — H&P PST ADULT - NSICDXPASTMEDICALHX_GEN_ALL_CORE_FT
PAST MEDICAL HISTORY:  CKD (chronic kidney disease)     COPD (chronic obstructive pulmonary disease) Bronchiectasis, recent PFT test was normal (as per patient ), followed by Dr. Cheng    Deviated septum septum perforation , , inconsequential ,    Former smoker     GERD (gastroesophageal reflux disease) Moreno's esophagus    Hypercholesteremia     Hypertension     Other and unspecified ventral hernia with obstruction, without gangrene     Sleep apnea information from Dr Pierre PCP 12/26/18- does not use CPAP

## 2023-05-04 NOTE — H&P PST ADULT - HISTORY OF PRESENT ILLNESS
78 year old male with PMH f HTN, HLD, prediabetes (A1C 6.2), COPD, lung nodules, FRANCO non on CPAP, spinal stenosis s/p fusion, former smoker, carotis artery stenosis s/p CEA rt, right inguinal hernia, presents for cardiac cath. Pt reports he has been feeling left side chest pain radiate to left arm on & off for past couple of months, evaluated by Dr. Mohamud and referred for cath. Last episode of chest pain was last night without exertion.     of L23 stenosis from adjacent segment degeneration s/p Lumbar Fusion and laminectomy, failed PT, NSAIDS, and ARNALDO, recommend surgical intervention.  s/p cervical fusion and s/p L3-S1 laminectomy in 2020 after falling a flight of concrete stairs at work in 2000.. Pt presents with c/o neck pain exacerbation for the past 1 year and low back pain exacerbation for the past 10 years progressively worsening over time. Pt denies recent injury/falls. Neck pain radiates occasionally to LUE to wrist associated with intermittent numbness,tingling on LUE. Low back pain radiates to right thigh and occasionally to ankle and tingling on B/L LE to toes. Pt denies weakness on B/L UE or LE. Pt takes Tylenol, this provides mild pain relief. Pt B/L L3,L4,L5 MBB on  04/21/23: s/p B/L L3,L4,L5 MBB on 03/29/23 with >80% relief and improvement of ADLs. Pain was great for a week and then started to return. Will be having surgery with Dr. Mcgregor on 5/15/23    03/03/2023: s/p second diagnostic B/L L3,L4,L5 MBB on 02/22/2023 with >80% relief and improvement of ADLs. Pain has been feeling much better since injection.     01/13/2023: s/p b/l L3,4,5 MBB on 12/28/22 with >80% relief and improvement of ADLs. Pain was great for 5-6 days and then started to return.  78 year old male with PMH f HTN, HLD, Prediabetes, COPD, lung nodules, FRANCO not on CPAP (states he is unable to tolerate), spinal stenosis, s/p fusion, former smoker, carotid Artery stenosis, S/P Right CEA, patient reports that he was referred to ED by opthalmologist after [place] for epiHe reports that all work-up was negative  s/p cervical fusion and s/p L3-S1 laminectomy in 2020 after falling a flight of concrete stairs at work in 2000.. Pt presents with c/o neck pain exacerbation for the past 1 year and low back pain exacerbation for the past 10 years progressively worsening over time. Pt denies recent injury/falls. Patient Low back pain radiates to right thigh and occasionally to ankle and tingling on B/L LE to toes. Pt denies weakness on B/L UE or LE. Pt takes Tylenol, this provides mild   He presents to PST today for evaluation prior to scheduled  L2-L3 Posterior Lumbar Laminectomy and Instrumented Spinal Fusion on 5/16/2023.         78 year old male with PMH of HTN, HLD, Prediabetes, COPD, lung nodules, FRANCO not on CPAP (states he is unable to tolerate), spinal stenosis, s/p fusion, former smoker, carotid Artery stenosis, S/P Right CEA, patient reports that he was hospitalized at [facility] 2 weeks ago after an episode of blurred [place] [address]  He reports that all work-up was negative. Patient reports that he underwent cervical fusion and s/p L3-S1 laminectomy in 2020 after falling a flight of concrete stairs at work in 2000. He reports neck pain exacerbation for the past 1 year and low back pain exacerbation for the past 10 years, and now progressively worsening over time. Patient Low back pain radiates to right thigh and occasionally to ankle and tingling on B/L LE to toes. He denies recent fall or injury. He presents to [facility] today for evaluation prior to scheduled  L2-L3 Posterior Lumbar Laminectomy and Instrumented Spinal Fusion on 5/16/2023.

## 2023-05-04 NOTE — H&P PST ADULT - MUSCULOSKELETAL COMMENTS
low back pain radiating down bilateral legs laterally to feet low back pain radiating down bilateral legs to feet

## 2023-05-04 NOTE — H&P PST ADULT - ATTENDING COMMENTS
77 yo male with L23 stenosis from adjacent segment degeneration s/p Lumbar Fusion and laminectomy, failed PT, NSAIDS, and ARNALDO, recommend surgical intervention . Patient has pain L>R with numbness and tingling, weakness of LLE 3-4/5.  Reduced sensation LLE. Unable to ambulate more then one block without significant pain and discomfort, that impacts ADL's and quality of life.     I reviewed all major risks, benefits, and complications associated with surgical intervention including but not limited to bleeding, infection, neurological injury, CSF leak, implant failure, implant migration, pedicle screw breech, pseudarthrosis, adjacent segment degeneration, hematoma, anesthetic risk, chronic pain and disability , medical complications (GI, , DVT, PE, cardiac, pulmonary, etc) and risk of mortality.

## 2023-05-04 NOTE — H&P PST ADULT - PROBLEM SELECTOR PLAN 1
L2-L3 Posterior Lumbar Laminectomy and Instrumented Spinal Fusion  -cbc, bmp, type and screen, mrsa/mssa swab at [facility]  -medical evaluation   -preop instructions provided  -ABO on admit L2-L3 Posterior Lumbar Laminectomy and Instrumented Spinal Fusion  -cbc, bmp, type and screen, mrsa/mssa swab at [facility]  -medical evaluation   -cardiology evaluation  -preop instructions provided  -ABO on admit

## 2023-05-04 NOTE — H&P PST ADULT - OTHER CARE PROVIDERS
Cardiologist- Thom Mohamud  // Pulmonary-- Dr. Fisher, [phone] // Nephrologist-- Dr. Nova, [phone] // Pain Management-- [place]

## 2023-05-04 NOTE — H&P PST ADULT - FUNCTIONAL STATUS
activity limited by low back pain, does gardening,/4-10 METS activity limited by low back pain, does gardening, dasi mets 6/4-10 METS

## 2023-05-05 ENCOUNTER — APPOINTMENT (OUTPATIENT)
Dept: CARDIOLOGY | Facility: CLINIC | Age: 78
End: 2023-05-05
Payer: MEDICARE

## 2023-05-05 VITALS
TEMPERATURE: 98 F | SYSTOLIC BLOOD PRESSURE: 134 MMHG | DIASTOLIC BLOOD PRESSURE: 83 MMHG | HEIGHT: 66 IN | OXYGEN SATURATION: 97 % | BODY MASS INDEX: 26.52 KG/M2 | HEART RATE: 76 BPM | WEIGHT: 165 LBS

## 2023-05-05 DIAGNOSIS — E78.00 PURE HYPERCHOLESTEROLEMIA, UNSPECIFIED: ICD-10-CM

## 2023-05-05 DIAGNOSIS — I65.29 OCCLUSION AND STENOSIS OF UNSPECIFIED CAROTID ARTERY: ICD-10-CM

## 2023-05-05 DIAGNOSIS — I10 ESSENTIAL (PRIMARY) HYPERTENSION: ICD-10-CM

## 2023-05-05 DIAGNOSIS — I25.10 ATHEROSCLEROTIC HEART DISEASE OF NATIVE CORONARY ARTERY W/OUT ANGINA PECTORIS: ICD-10-CM

## 2023-05-05 PROCEDURE — 99214 OFFICE O/P EST MOD 30 MIN: CPT

## 2023-05-05 NOTE — DISCUSSION/SUMMARY
[FreeTextEntry1] : Unclear episode a few weeks ago with transient blurred vision and sensation in jaw. He went to the hospital with a negative work up. His ECG was normal, and presumably so was blood work (no documented blood work available at this time). Neuro work up was normal. Recent cath notable for moderate nonobstructive CAD\par \par CAD: Moderate on cath, no further symptoms. Intolerant to statins, would encourage use of PCSK-9 inhibitors. On zetia\par \par HTN: BP good today, cont norvasc\par \par HLD: Lipids high, intolerant to statin. Now on zetia. Consider PCSK-9\par \par This patient is able to perform >4 METS of activity without limitation. No evidence of ongoing ischemia, arrhythmia, valvular heart disease or decompensated heart failure.  This patient is optimized from a cardiac standpoint for this intermediate risk surgery.  No further cardiac testing is necessary prior to surgery.\par \par RV 6M

## 2023-05-05 NOTE — HISTORY OF PRESENT ILLNESS
[FreeTextEntry1] : 78M with HTN, HLD, moderate CAD\par \par Pt is pending back surgery with Dr. Mcgregor\par Had an episode in mid April 2023 where his vision was cloudy for around 10-15 minutes, felt pulling sensation in jaw. Went to [facility] x1 day, ECG unremarkable, TTE WNL. Negative work up per patient including MRI brain. \par Since then he has felt well. \par No further CP, no dyspnea, lightheadedness \par Elevated lipids, , intolerant to multiple statins (leg cramps) \par Was put on a non-statin medication by hospital, he is not sure which one \par Can climb several flights of stairs without difficulty\par Works his garden daily \par \par He reports a hx of a R CEA about 5 years ago \par \par Former smoker, quit in his 20s. Used to work for the [place] [employer] department\par \par ECG: SR, no ST-T wave changes\par [facility] 11/25/22: 50% pLAD, 50%  mLAD, (iFR 0.93)\par TTE: Normal LV function, mild DD, 55-60%, mild TR \par \par Zetia 10mg\par Amlodipine 5mg \par

## 2023-05-06 PROBLEM — R35.1 NOCTURIA: Status: ACTIVE | Noted: 2022-02-03

## 2023-05-06 PROBLEM — R79.89 ELEVATED SERUM CREATININE: Status: ACTIVE | Noted: 2021-12-06

## 2023-05-06 RX ORDER — TAMSULOSIN HYDROCHLORIDE 0.4 MG/1
0.4 CAPSULE ORAL DAILY
Qty: 90 | Refills: 3 | Status: ACTIVE | COMMUNITY
Start: 2023-01-09 | End: 1900-01-01

## 2023-05-06 NOTE — HISTORY OF PRESENT ILLNESS
[FreeTextEntry1] : 78 year old male w h/o LUTS and significant proteinuria\par \par was taking tamsulosin 0.4 mg- renewed on 1/9/2023, pt said pharmacy would not refill\par \par has not been taking, urinary symptoms have increased\par \par most recent IPSS: 1/9/2023, 28\par 3/22/2022, 7\par \par went to nephrologist- reviewed labs 2/25/2023 w pt-[place]\par high urine albumin/creatinine ratio\par high serum creatinine\par high egfr\par low alt\par high urine protein/creatinine ratio\par high urine protein\par \par \par \par

## 2023-05-06 NOTE — ASSESSMENT
[FreeTextEntry1] : 78 year old male w h/o LUTS and significant proteinuria\par \par prescription for tamsulosin 0.4 mg resent today, should help urinary symptoms\par \par f/u at scheduled appointment in 8 weeks for PVR, reevaluation, PSA

## 2023-05-06 NOTE — END OF VISIT
[FreeTextEntry3] : All medical record entries made by the scribe today, were at my direction and personally dictated to them by me, Dr. Meliton Cruz on 04/18/2023. I have reviewed the chart and agree that the record accurately reflects my personal performance of the history, physical exam, assessment, and plan. I have also personally directed, reviewed, and agreed with the chart.\par

## 2023-05-15 ENCOUNTER — TRANSCRIPTION ENCOUNTER (OUTPATIENT)
Age: 78
End: 2023-05-15

## 2023-05-16 ENCOUNTER — APPOINTMENT (OUTPATIENT)
Dept: ORTHOPEDIC SURGERY | Facility: HOSPITAL | Age: 78
End: 2023-05-16

## 2023-05-16 ENCOUNTER — TRANSCRIPTION ENCOUNTER (OUTPATIENT)
Age: 78
End: 2023-05-16

## 2023-05-16 ENCOUNTER — INPATIENT (INPATIENT)
Facility: HOSPITAL | Age: 78
LOS: 4 days | Discharge: HOME | DRG: 460 | End: 2023-05-21
Attending: ORTHOPAEDIC SURGERY | Admitting: ORTHOPAEDIC SURGERY
Payer: MEDICARE

## 2023-05-16 ENCOUNTER — RESULT REVIEW (OUTPATIENT)
Age: 78
End: 2023-05-16

## 2023-05-16 VITALS
HEIGHT: 66 IN | SYSTOLIC BLOOD PRESSURE: 162 MMHG | RESPIRATION RATE: 18 BRPM | HEART RATE: 63 BPM | OXYGEN SATURATION: 96 % | WEIGHT: 164.46 LBS | TEMPERATURE: 98 F | DIASTOLIC BLOOD PRESSURE: 76 MMHG

## 2023-05-16 DIAGNOSIS — Z98.890 OTHER SPECIFIED POSTPROCEDURAL STATES: Chronic | ICD-10-CM

## 2023-05-16 DIAGNOSIS — Z96.651 PRESENCE OF RIGHT ARTIFICIAL KNEE JOINT: Chronic | ICD-10-CM

## 2023-05-16 DIAGNOSIS — Z98.1 ARTHRODESIS STATUS: Chronic | ICD-10-CM

## 2023-05-16 DIAGNOSIS — M51.36 OTHER INTERVERTEBRAL DISC DEGENERATION, LUMBAR REGION: ICD-10-CM

## 2023-05-16 DIAGNOSIS — Z98.49 CATARACT EXTRACTION STATUS, UNSPECIFIED EYE: Chronic | ICD-10-CM

## 2023-05-16 DIAGNOSIS — Z87.438 PERSONAL HISTORY OF OTHER DISEASES OF MALE GENITAL ORGANS: Chronic | ICD-10-CM

## 2023-05-16 DIAGNOSIS — Z96.652 PRESENCE OF LEFT ARTIFICIAL KNEE JOINT: Chronic | ICD-10-CM

## 2023-05-16 DIAGNOSIS — M48.07 SPINAL STENOSIS, LUMBOSACRAL REGION: ICD-10-CM

## 2023-05-16 LAB
ANION GAP SERPL CALC-SCNC: 15 MMOL/L — SIGNIFICANT CHANGE UP (ref 5–17)
BASOPHILS # BLD AUTO: 0.03 K/UL — SIGNIFICANT CHANGE UP (ref 0–0.2)
BASOPHILS NFR BLD AUTO: 0.3 % — SIGNIFICANT CHANGE UP (ref 0–2)
BUN SERPL-MCNC: 20 MG/DL — SIGNIFICANT CHANGE UP (ref 7–23)
CALCIUM SERPL-MCNC: 8.4 MG/DL — SIGNIFICANT CHANGE UP (ref 8.4–10.5)
CHLORIDE SERPL-SCNC: 107 MMOL/L — SIGNIFICANT CHANGE UP (ref 96–108)
CO2 SERPL-SCNC: 22 MMOL/L — SIGNIFICANT CHANGE UP (ref 22–31)
CREAT SERPL-MCNC: 1.24 MG/DL — SIGNIFICANT CHANGE UP (ref 0.5–1.3)
EGFR: 60 ML/MIN/1.73M2 — SIGNIFICANT CHANGE UP
EOSINOPHIL # BLD AUTO: 0.06 K/UL — SIGNIFICANT CHANGE UP (ref 0–0.5)
EOSINOPHIL NFR BLD AUTO: 0.7 % — SIGNIFICANT CHANGE UP (ref 0–6)
GLUCOSE BLDC GLUCOMTR-MCNC: 115 MG/DL — HIGH (ref 70–99)
GLUCOSE BLDC GLUCOMTR-MCNC: 129 MG/DL — HIGH (ref 70–99)
GLUCOSE BLDC GLUCOMTR-MCNC: 158 MG/DL — HIGH (ref 70–99)
GLUCOSE SERPL-MCNC: 143 MG/DL — HIGH (ref 70–99)
HCT VFR BLD CALC: 43.2 % — SIGNIFICANT CHANGE UP (ref 39–50)
HGB BLD-MCNC: 14.5 G/DL — SIGNIFICANT CHANGE UP (ref 13–17)
IMM GRANULOCYTES NFR BLD AUTO: 0.7 % — SIGNIFICANT CHANGE UP (ref 0–0.9)
LYMPHOCYTES # BLD AUTO: 0.6 K/UL — LOW (ref 1–3.3)
LYMPHOCYTES # BLD AUTO: 6.7 % — LOW (ref 13–44)
MCHC RBC-ENTMCNC: 30.8 PG — SIGNIFICANT CHANGE UP (ref 27–34)
MCHC RBC-ENTMCNC: 33.6 GM/DL — SIGNIFICANT CHANGE UP (ref 32–36)
MCV RBC AUTO: 91.7 FL — SIGNIFICANT CHANGE UP (ref 80–100)
MONOCYTES # BLD AUTO: 0.53 K/UL — SIGNIFICANT CHANGE UP (ref 0–0.9)
MONOCYTES NFR BLD AUTO: 5.9 % — SIGNIFICANT CHANGE UP (ref 2–14)
NEUTROPHILS # BLD AUTO: 7.74 K/UL — HIGH (ref 1.8–7.4)
NEUTROPHILS NFR BLD AUTO: 85.7 % — HIGH (ref 43–77)
NRBC # BLD: 0 /100 WBCS — SIGNIFICANT CHANGE UP (ref 0–0)
PLATELET # BLD AUTO: 210 K/UL — SIGNIFICANT CHANGE UP (ref 150–400)
POTASSIUM SERPL-MCNC: 4 MMOL/L — SIGNIFICANT CHANGE UP (ref 3.5–5.3)
POTASSIUM SERPL-SCNC: 4 MMOL/L — SIGNIFICANT CHANGE UP (ref 3.5–5.3)
RBC # BLD: 4.71 M/UL — SIGNIFICANT CHANGE UP (ref 4.2–5.8)
RBC # FLD: 14.2 % — SIGNIFICANT CHANGE UP (ref 10.3–14.5)
SODIUM SERPL-SCNC: 144 MMOL/L — SIGNIFICANT CHANGE UP (ref 135–145)
WBC # BLD: 9.02 K/UL — SIGNIFICANT CHANGE UP (ref 3.8–10.5)
WBC # FLD AUTO: 9.02 K/UL — SIGNIFICANT CHANGE UP (ref 3.8–10.5)

## 2023-05-16 PROCEDURE — 22842 INSERT SPINE FIXATION DEVICE: CPT

## 2023-05-16 PROCEDURE — 22612 ARTHRD PST TQ 1NTRSPC LUMBAR: CPT

## 2023-05-16 PROCEDURE — 88300 SURGICAL PATH GROSS: CPT | Mod: 26

## 2023-05-16 PROCEDURE — 15734 MUSCLE-SKIN GRAFT TRUNK: CPT

## 2023-05-16 PROCEDURE — 20937 SP BONE AGRFT MORSEL ADD-ON: CPT

## 2023-05-16 PROCEDURE — 63042 LAMINOTOMY SINGLE LUMBAR: CPT | Mod: 50

## 2023-05-16 PROCEDURE — 61783 SCAN PROC SPINAL: CPT | Mod: 59

## 2023-05-16 PROCEDURE — 72131 CT LUMBAR SPINE W/O DYE: CPT | Mod: 26

## 2023-05-16 DEVICE — SCREW SOLERA 5.5X45MM: Type: IMPLANTABLE DEVICE | Status: FUNCTIONAL

## 2023-05-16 DEVICE — IMPLANTABLE DEVICE: Type: IMPLANTABLE DEVICE | Status: FUNCTIONAL

## 2023-05-16 DEVICE — KIT A-LINE 1LUM 20G X 12CM SAFE KIT: Type: IMPLANTABLE DEVICE | Status: FUNCTIONAL

## 2023-05-16 DEVICE — ROD TI 60MM: Type: IMPLANTABLE DEVICE | Status: FUNCTIONAL

## 2023-05-16 DEVICE — SURGIFLO HEMOSTATIC MATRIX KIT: Type: IMPLANTABLE DEVICE | Status: FUNCTIONAL

## 2023-05-16 DEVICE — ROD TI 55MM: Type: IMPLANTABLE DEVICE | Status: FUNCTIONAL

## 2023-05-16 DEVICE — SCREW SET: Type: IMPLANTABLE DEVICE | Status: FUNCTIONAL

## 2023-05-16 DEVICE — GELFOAM 12 X 7MM: Type: IMPLANTABLE DEVICE | Status: FUNCTIONAL

## 2023-05-16 RX ORDER — OXYCODONE HYDROCHLORIDE 5 MG/1
5 TABLET ORAL EVERY 6 HOURS
Refills: 0 | Status: DISCONTINUED | OUTPATIENT
Start: 2023-05-16 | End: 2023-05-16

## 2023-05-16 RX ORDER — HYDROMORPHONE HYDROCHLORIDE 2 MG/ML
0.5 INJECTION INTRAMUSCULAR; INTRAVENOUS; SUBCUTANEOUS EVERY 4 HOURS
Refills: 0 | Status: DISCONTINUED | OUTPATIENT
Start: 2023-05-16 | End: 2023-05-17

## 2023-05-16 RX ORDER — DEXTROSE 50 % IN WATER 50 %
25 SYRINGE (ML) INTRAVENOUS ONCE
Refills: 0 | Status: DISCONTINUED | OUTPATIENT
Start: 2023-05-16 | End: 2023-05-21

## 2023-05-16 RX ORDER — SODIUM CHLORIDE 9 MG/ML
1000 INJECTION, SOLUTION INTRAVENOUS
Refills: 0 | Status: DISCONTINUED | OUTPATIENT
Start: 2023-05-16 | End: 2023-05-21

## 2023-05-16 RX ORDER — DEXTROSE 50 % IN WATER 50 %
15 SYRINGE (ML) INTRAVENOUS ONCE
Refills: 0 | Status: DISCONTINUED | OUTPATIENT
Start: 2023-05-16 | End: 2023-05-21

## 2023-05-16 RX ORDER — TRAMADOL HYDROCHLORIDE 50 MG/1
50 TABLET ORAL EVERY 6 HOURS
Refills: 0 | Status: DISCONTINUED | OUTPATIENT
Start: 2023-05-16 | End: 2023-05-16

## 2023-05-16 RX ORDER — EZETIMIBE 10 MG/1
1 TABLET ORAL
Refills: 0 | DISCHARGE

## 2023-05-16 RX ORDER — FENTANYL CITRATE 50 UG/ML
25 INJECTION INTRAVENOUS
Refills: 0 | Status: DISCONTINUED | OUTPATIENT
Start: 2023-05-16 | End: 2023-05-16

## 2023-05-16 RX ORDER — CYCLOBENZAPRINE HYDROCHLORIDE 10 MG/1
10 TABLET, FILM COATED ORAL EVERY 8 HOURS
Refills: 0 | Status: DISCONTINUED | OUTPATIENT
Start: 2023-05-16 | End: 2023-05-16

## 2023-05-16 RX ORDER — INSULIN LISPRO 100/ML
VIAL (ML) SUBCUTANEOUS AT BEDTIME
Refills: 0 | Status: DISCONTINUED | OUTPATIENT
Start: 2023-05-16 | End: 2023-05-21

## 2023-05-16 RX ORDER — ACETAMINOPHEN 500 MG
650 TABLET ORAL EVERY 6 HOURS
Refills: 0 | Status: DISCONTINUED | OUTPATIENT
Start: 2023-05-16 | End: 2023-05-16

## 2023-05-16 RX ORDER — SENNA PLUS 8.6 MG/1
2 TABLET ORAL AT BEDTIME
Refills: 0 | Status: DISCONTINUED | OUTPATIENT
Start: 2023-05-16 | End: 2023-05-21

## 2023-05-16 RX ORDER — SODIUM CHLORIDE 9 MG/ML
1000 INJECTION, SOLUTION INTRAVENOUS
Refills: 0 | Status: DISCONTINUED | OUTPATIENT
Start: 2023-05-16 | End: 2023-05-16

## 2023-05-16 RX ORDER — TRAMADOL HYDROCHLORIDE 50 MG/1
50 TABLET ORAL EVERY 6 HOURS
Refills: 0 | Status: DISCONTINUED | OUTPATIENT
Start: 2023-05-16 | End: 2023-05-21

## 2023-05-16 RX ORDER — HYDROMORPHONE HYDROCHLORIDE 2 MG/ML
0.5 INJECTION INTRAMUSCULAR; INTRAVENOUS; SUBCUTANEOUS
Refills: 0 | Status: DISCONTINUED | OUTPATIENT
Start: 2023-05-16 | End: 2023-05-16

## 2023-05-16 RX ORDER — HYDROMORPHONE HYDROCHLORIDE 2 MG/ML
0.5 INJECTION INTRAMUSCULAR; INTRAVENOUS; SUBCUTANEOUS
Refills: 0 | Status: DISCONTINUED | OUTPATIENT
Start: 2023-05-16 | End: 2023-05-17

## 2023-05-16 RX ORDER — PANTOPRAZOLE SODIUM 20 MG/1
40 TABLET, DELAYED RELEASE ORAL
Refills: 0 | Status: DISCONTINUED | OUTPATIENT
Start: 2023-05-16 | End: 2023-05-21

## 2023-05-16 RX ORDER — SODIUM CHLORIDE 9 MG/ML
1000 INJECTION INTRAMUSCULAR; INTRAVENOUS; SUBCUTANEOUS
Refills: 0 | Status: DISCONTINUED | OUTPATIENT
Start: 2023-05-16 | End: 2023-05-21

## 2023-05-16 RX ORDER — ONDANSETRON 8 MG/1
4 TABLET, FILM COATED ORAL ONCE
Refills: 0 | Status: DISCONTINUED | OUTPATIENT
Start: 2023-05-16 | End: 2023-05-16

## 2023-05-16 RX ORDER — OMEPRAZOLE 10 MG/1
1 CAPSULE, DELAYED RELEASE ORAL
Refills: 0 | DISCHARGE

## 2023-05-16 RX ORDER — METHOCARBAMOL 500 MG/1
500 TABLET, FILM COATED ORAL EVERY 6 HOURS
Refills: 0 | Status: DISCONTINUED | OUTPATIENT
Start: 2023-05-16 | End: 2023-05-21

## 2023-05-16 RX ORDER — OXYCODONE HYDROCHLORIDE 5 MG/1
5 TABLET ORAL EVERY 6 HOURS
Refills: 0 | Status: DISCONTINUED | OUTPATIENT
Start: 2023-05-16 | End: 2023-05-19

## 2023-05-16 RX ORDER — AMLODIPINE BESYLATE 2.5 MG/1
1 TABLET ORAL
Qty: 0 | Refills: 0 | DISCHARGE

## 2023-05-16 RX ORDER — INSULIN LISPRO 100/ML
VIAL (ML) SUBCUTANEOUS
Refills: 0 | Status: DISCONTINUED | OUTPATIENT
Start: 2023-05-16 | End: 2023-05-21

## 2023-05-16 RX ORDER — TRAMADOL HYDROCHLORIDE 50 MG/1
25 TABLET ORAL EVERY 6 HOURS
Refills: 0 | Status: DISCONTINUED | OUTPATIENT
Start: 2023-05-16 | End: 2023-05-21

## 2023-05-16 RX ORDER — TAMSULOSIN HYDROCHLORIDE 0.4 MG/1
0.4 CAPSULE ORAL AT BEDTIME
Refills: 0 | Status: DISCONTINUED | OUTPATIENT
Start: 2023-05-16 | End: 2023-05-21

## 2023-05-16 RX ORDER — ONDANSETRON 8 MG/1
4 TABLET, FILM COATED ORAL EVERY 6 HOURS
Refills: 0 | Status: DISCONTINUED | OUTPATIENT
Start: 2023-05-16 | End: 2023-05-21

## 2023-05-16 RX ORDER — DEXAMETHASONE 0.5 MG/5ML
5 ELIXIR ORAL EVERY 6 HOURS
Refills: 0 | Status: COMPLETED | OUTPATIENT
Start: 2023-05-16 | End: 2023-05-17

## 2023-05-16 RX ORDER — TAMSULOSIN HYDROCHLORIDE 0.4 MG/1
1 CAPSULE ORAL
Refills: 0 | DISCHARGE

## 2023-05-16 RX ORDER — MONTELUKAST 4 MG/1
1 TABLET, CHEWABLE ORAL
Refills: 0 | DISCHARGE

## 2023-05-16 RX ORDER — ACETAMINOPHEN 500 MG
650 TABLET ORAL EVERY 6 HOURS
Refills: 0 | Status: DISCONTINUED | OUTPATIENT
Start: 2023-05-17 | End: 2023-05-17

## 2023-05-16 RX ORDER — GLUCAGON INJECTION, SOLUTION 0.5 MG/.1ML
1 INJECTION, SOLUTION SUBCUTANEOUS ONCE
Refills: 0 | Status: DISCONTINUED | OUTPATIENT
Start: 2023-05-16 | End: 2023-05-21

## 2023-05-16 RX ORDER — SODIUM CHLORIDE 9 MG/ML
500 INJECTION INTRAMUSCULAR; INTRAVENOUS; SUBCUTANEOUS ONCE
Refills: 0 | Status: COMPLETED | OUTPATIENT
Start: 2023-05-17 | End: 2023-05-17

## 2023-05-16 RX ORDER — EZETIMIBE 10 MG/1
10 TABLET ORAL DAILY
Refills: 0 | Status: DISCONTINUED | OUTPATIENT
Start: 2023-05-17 | End: 2023-05-21

## 2023-05-16 RX ORDER — AMLODIPINE BESYLATE 2.5 MG/1
5 TABLET ORAL DAILY
Refills: 0 | Status: DISCONTINUED | OUTPATIENT
Start: 2023-05-16 | End: 2023-05-21

## 2023-05-16 RX ORDER — ACETAMINOPHEN 500 MG
1000 TABLET ORAL ONCE
Refills: 0 | Status: COMPLETED | OUTPATIENT
Start: 2023-05-17 | End: 2023-05-17

## 2023-05-16 RX ORDER — MONTELUKAST 4 MG/1
10 TABLET, CHEWABLE ORAL DAILY
Refills: 0 | Status: DISCONTINUED | OUTPATIENT
Start: 2023-05-16 | End: 2023-05-21

## 2023-05-16 RX ORDER — DEXTROSE 50 % IN WATER 50 %
12.5 SYRINGE (ML) INTRAVENOUS ONCE
Refills: 0 | Status: DISCONTINUED | OUTPATIENT
Start: 2023-05-16 | End: 2023-05-21

## 2023-05-16 RX ORDER — ACETAMINOPHEN 500 MG
1000 TABLET ORAL ONCE
Refills: 0 | Status: COMPLETED | OUTPATIENT
Start: 2023-05-16 | End: 2023-05-16

## 2023-05-16 RX ORDER — BENZOCAINE AND MENTHOL 5; 1 G/100ML; G/100ML
1 LIQUID ORAL EVERY 6 HOURS
Refills: 0 | Status: DISCONTINUED | OUTPATIENT
Start: 2023-05-16 | End: 2023-05-21

## 2023-05-16 RX ADMIN — Medication 400 MILLIGRAM(S): at 20:27

## 2023-05-16 RX ADMIN — Medication 1000 MILLIGRAM(S): at 21:00

## 2023-05-16 RX ADMIN — METHOCARBAMOL 500 MILLIGRAM(S): 500 TABLET, FILM COATED ORAL at 20:27

## 2023-05-16 RX ADMIN — Medication 100 MILLIGRAM(S): at 21:35

## 2023-05-16 RX ADMIN — Medication 5 MILLIGRAM(S): at 20:28

## 2023-05-16 RX ADMIN — FENTANYL CITRATE 25 MICROGRAM(S): 50 INJECTION INTRAVENOUS at 14:25

## 2023-05-16 RX ADMIN — HYDROMORPHONE HYDROCHLORIDE 0.5 MILLIGRAM(S): 2 INJECTION INTRAMUSCULAR; INTRAVENOUS; SUBCUTANEOUS at 21:33

## 2023-05-16 RX ADMIN — HYDROMORPHONE HYDROCHLORIDE 0.5 MILLIGRAM(S): 2 INJECTION INTRAMUSCULAR; INTRAVENOUS; SUBCUTANEOUS at 16:15

## 2023-05-16 RX ADMIN — OXYCODONE HYDROCHLORIDE 5 MILLIGRAM(S): 5 TABLET ORAL at 18:22

## 2023-05-16 RX ADMIN — TAMSULOSIN HYDROCHLORIDE 0.4 MILLIGRAM(S): 0.4 CAPSULE ORAL at 21:34

## 2023-05-16 RX ADMIN — FENTANYL CITRATE 25 MICROGRAM(S): 50 INJECTION INTRAVENOUS at 15:00

## 2023-05-16 RX ADMIN — SENNA PLUS 2 TABLET(S): 8.6 TABLET ORAL at 21:34

## 2023-05-16 RX ADMIN — HYDROMORPHONE HYDROCHLORIDE 0.5 MILLIGRAM(S): 2 INJECTION INTRAMUSCULAR; INTRAVENOUS; SUBCUTANEOUS at 15:45

## 2023-05-16 RX ADMIN — HYDROMORPHONE HYDROCHLORIDE 0.5 MILLIGRAM(S): 2 INJECTION INTRAMUSCULAR; INTRAVENOUS; SUBCUTANEOUS at 22:33

## 2023-05-16 NOTE — CHART NOTE - NSCHARTNOTEFT_GEN_A_CORE
POC    Patient Resting without complaints.   seen in PACU   No Chest Pain, SOB, N/V.    Pre op s/sx : LLE radiculopathy    Post op, patient reports: some improvement.    Exam:   Alert/Oriented, No Acute Distress  Cards: +S1/S2, RRR  Pulm: CTAB  BACK:          Dressing: [x ] clean/dry/intact  [ ] Other:           Drains: : HV x 1: 35 cc         Sensation: [ ] intact to light touch  [x ] mildly decreased L ankle/foot         Motor exam: [ x ]                          [x ] Lower extremity                    PF          DF         EHL       FHL                                                                                            R        5/5        5/5        5/5       5/5                                                        L         5/5        5/5        5/5       5/5                SLR L < R                                                           Calves Soft/Non-tender bilaterally           [ x] warm well perfused; capillary refill <3 seconds              LABS:                        14.5   9.02  )-----------( 210      ( 16 May 2023 14:45 )             43.2     05-16    144  |  107  |  20  ----------------------------<  143<H>  4.0   |  22  |  1.24        A/P :  78y Male s/p Revision, fusion, spine, lumbar L2-3      -    Pain control         Ultram / Oxycodone / Robaxin  -    continue drain  -    Taper        **  monitor BGLs ( h/o Pre-Diabetes)  -     AM Labs          monitor BUN/Cr  [ h/o CKD]  -    DVT ppx: SCDs       -    Physical Therapy  -    Weight bearing status: WBAT [x ]        PWB    [ ]     TTWB  [ ]      NWB  [ ]  -    Dispo: Home [ ]     Rehab [ ]      MIKEY [ ]      To be determined [x ]      ***See Above  Titi SLOAN  Orthopedics  B: [id number] POC    Patient Resting without complaints.   seen in PACU   No Chest Pain, SOB, N/V.    Pre op s/sx : LLE radiculopathy    Post op, patient reports: some improvement.    Exam:   Alert/Oriented, No Acute Distress  Cards: +S1/S2, RRR  Pulm: CTAB  BACK:          Dressing: [x ] clean/dry/intact  [ ] Other:           Drains: : HV x 1: 35 cc         Sensation: [ ] intact to light touch  [x ] mildly decreased L ankle/foot         Motor exam: [ x ]                          [x ] Lower extremity                    PF          DF         EHL       FHL                                                                                            R        5/5        5/5        5/5       5/5                                                        L         5/5        5/5        5/5       5/5                SLR L < R                                                           Calves Soft/Non-tender bilaterally           [ x] warm well perfused; capillary refill <3 seconds              LABS:                        14.5   9.02  )-----------( 210      ( 16 May 2023 14:45 )             43.2     05-16    144  |  107  |  20  ----------------------------<  143<H>  4.0   |  22  |  1.24        A/P :  78y Male s/p Revision, fusion, spine, lumbar L2-3      -    Pain control         Ultram / Oxycodone / Robaxin  -    continue drain  -    Taper        **  monitor BGLs ( h/o Pre-Diabetes)  -     AM Labs          monitor BUN/Cr  [ h/o CKD]  -    DVT ppx: SCDs       -   Caprine Score [6]       -- Will order dopplers BLE prophylactically  -    Physical Therapy  -    Weight bearing status: WBAT [x ]        PWB    [ ]     TTWB  [ ]      NWB  [ ]  -    Dispo: Home [ ]     Rehab [ ]      MIKEY [ ]      To be determined [x ]      ***See Above  Titi SLOAN  Orthopedics  B: [id number] POC    Patient Resting without complaints.   seen in PACU   No Chest Pain, SOB, N/V.    Pre op s/sx : LLE radiculopathy    Post op, patient reports: some improvement.    Exam:   Alert/Oriented, No Acute Distress  Cards: +S1/S2, RRR  Pulm: CTAB  ABD: soft benign   menon [Y]  BACK:          Dressing: [x ] clean/dry/intact  [ ] Other:           Drains: : HV x 1: 35 cc         Sensation: [ ] intact to light touch  [x ] mildly decreased L ankle/foot         Motor exam: [ x ]                          [x ] Lower extremity                    PF          DF         EHL       FHL                                                                                            R        5/5        5/5        5/5       5/5                                                        L         5/5        5/5        5/5       5/5                SLR L < R                                                           Calves Soft/Non-tender bilaterally           [ x] warm well perfused; capillary refill <3 seconds              LABS:                        14.5   9.02  )-----------( 210      ( 16 May 2023 14:45 )             43.2     05-16    144  |  107  |  20  ----------------------------<  143<H>  4.0   |  22  |  1.24        A/P :  78y Male s/p Revision, fusion, spine, lumbar L2-3      -    Pain control         Ultram / Oxycodone / Robaxin  -    continue drain  -    Taper        **  monitor BGLs ( h/o Pre-Diabetes)  -     AM Labs          monitor BUN/Cr  [ h/o CKD]  -    DVT ppx: SCDs    -     Cont menon     -   Caprine Score [6]       -- Will order dopplers BLE prophylactically  -    Physical Therapy  -    Weight bearing status: WBAT [x ]        PWB    [ ]     TTWB  [ ]      NWB  [ ]  -    Dispo: Home [ ]     Rehab [ ]      MIKEY [ ]      To be determined [x ]      ***See Above  Titi Thomas PA  Orthopedics  B: [id number]

## 2023-05-16 NOTE — PATIENT PROFILE ADULT - NSPRONUTRITIONRISK_GEN_A_NUR
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  Tumor is significant for diffuse colonization of the surface epithelium and submucosal seromucinous glands with no lauren invasion into adjacent stroma.  stage cTis-T1 N0 M0  Treatment Site: Paranasal sinus and neck  Current Dose/Planned Total Dose: [5000] cGy / [6600] cGy  Daily Fraction Size: [200] cGy/day, [5] times/week  Fraction:   Concurrent Chemotherapy: Yes  Drug and Frequency: Weekly cisplatin    Medical Oncologist: Rosmery Paige MD  Surgeon: Riccardo Jones MD    Subjective: Ms. Bangura presents to clinic today for her weekly on-treatment visit. She complains of significant fatigue, not completely alleviated by naps. She endorses epistaxis from the left nares, small amounts of bleeding, which seems to be associated with skin erosion. She continues to use gentamicin sinus washes, prescribed by ENT. She has sore throat, exacerbated by swallowing, 10/10 at its worst, for which she is taking Tylenol land swishing with Magic Mouthwash. She is using Ensure protein supplements to avoid weight loss. She has missed two weeks of chemotherapy due to pancytopenia.    Nursing ROS:   Nutrition Alteration  Diet Type: Patient's Preference  Skin  Skin Reaction: 0 - No changes  CNS Alteration  CNS note: WNL  ENT and Mouth Exam  Mucositis - Current: 1 - Generalized erythema  ENT/Mouth Note: Nose bleed     Gastrointestinal  Nausea: 1 - One to two episodes of nausea/24     Psychosocial  Mood - Anxiety: 1 - Mild mood alteration  Pain Assessment  0-10 Pain Scale: 4  Pain Note: 4 up to 10 with swallowing    PEG Tube: None    Electronic Cardiac Implant: None    Objective:   /73   Pulse 67   Resp 16   SpO2 98%   Gen: Appears well, NAD  HEENT: Mucositis involving right  tonsil and right lateral oropharynx  Skin: Minimal erythema over forehead and cheeks    Laboratory:  Lab Results   Component Value Date    WBC 1.6 (L) 09/20/2022    HGB 10.5 (L) 09/20/2022    HCT 31.6 (L) 09/20/2022    MCV 93 09/20/2022    PLT 86 (L) 09/20/2022     Lab Results   Component Value Date     09/20/2022    POTASSIUM 4.3 09/20/2022    CHLORIDE 102 09/20/2022    CO2 25 09/20/2022     (H) 09/20/2022     Lab Results   Component Value Date    AST 18 09/20/2022    ALT 11 09/20/2022    ALKPHOS 66 09/20/2022    BILITOTAL 0.5 09/20/2022     Magnesium   Date Value Ref Range Status   09/20/2022 2.0 1.7 - 2.3 mg/dL Final       Treatment-related toxicities (CTCAE v5.0):  Fatigue: Grade 2: Fatigue not relieved by rest; limiting instrumental ADL  Pain: Grade 2: Moderate pain; limiting instrumental ADL  Mucositis: Grade 2: Moderate pain; not interfering with oral intake; modified diet indicated  Dermatitis: Grade 1: Faint erythema or dry desquamation   Dysgeusia    ED visits/Hospitalizations: None    Missed Treatments: None    Mosaiq chart and setup information reviewed  IGRT images reviewed    Medication Review  Med list reviewed with patient?: Yes  Med list printed and given: Yes    Assessment:    Ms. 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     Plan:   1.  Continue treatment as planned  2.  We counseled Sonia on signs of dry nasal mucosa which include crusting, thicker mucous and tendency for epistaxis. We recommended using nasal saline followed by Aquaphor applied to the nares. Ms. Bangura is hesitant to follow our recommendations due to her belief that nasal spray could exacerbate her bleeding.  3.  Encouraged diet supplements to prevent weight loss  4.  Continue OTC analgesics for pain. Patient declined prescription pain medications at this time.  5.  Educated patient that the irritation in the right neck and mouth is  related to radiation inducted inflammation -- as opposed to infection -- and will not respond to antibiotics    Shirlene Rachel MD  PGY-3  Department of Radiation Oncology  [facility]    Ms. Bangura was seen and examined by me. Note above by Dr. Rachel was reviewed and edited by me and reflects our mutual findings and plan of care.    Katie Jarvis MD  Department of Radiation Oncology  [facility]   No indicators present

## 2023-05-16 NOTE — PRE-ANESTHESIA EVALUATION ADULT - NSPROPOSEDPROCEDFT_GEN_ALL_CORE
I reviewed the H&P, I examined the patient, and there are no changes in the patient's condition.       LM for pt to call for AWV. L2-L3 Posterior Lumbar Laminectomy and Instrumented Fusion with Group [employer]

## 2023-05-16 NOTE — PHYSICAL THERAPY INITIAL EVALUATION ADULT - PERTINENT HX OF CURRENT PROBLEM, REHAB EVAL
78 year old male with PMH of HTN, HLD, Prediabetes, COPD, lung nodules, FRANCO not on CPAP (states he is unable to tolerate), spinal stenosis, s/p fusion, former smoker, carotid Artery stenosis, S/P Right CEA, patient reports that he was hospitalized at [facility] 2 weeks ago after an episode of blurred vision [place] [address]  He reports that all work-up was negative. Patient reports that he underwent cervical fusion and s/p L3-S1 laminectomy in 2020 after falling a flight of concrete stairs at work in 2000. He reports neck pain exacerbation for the past 1 year and low back pain exacerbation for the past 10 years, and now progressively worsening over time. Patient Low back pain radiates to right thigh and occasionally to ankle and tingling on B/L LE to toes. He denies recent fall or injury. He presents to [facility] today for evaluation prior to scheduled  L2-L3 Posterior Lumbar Laminectomy and Instrumented Spinal Fusion on 5/16/2023.

## 2023-05-16 NOTE — BRIEF OPERATIVE NOTE - NSICDXBRIEFPREOP_GEN_ALL_CORE_FT
PRE-OP DIAGNOSIS:  Lumbar spinal stenosis due to adjacent segment disease after fusion procedure 16-May-2023 14:38:43  Mohsen Pennington

## 2023-05-16 NOTE — PHYSICAL THERAPY INITIAL EVALUATION ADULT - ADDITIONAL COMMENTS
chest pain Pt states that he lives in a pvt house with his wife +3 steps to enter +handrail and 1st floor setup once inside. Pt reports that he was ambulating with a straight cane prior to admission and does not own any other DME. Pt states that he was independent with all ADLs and functional mobility prior with assist as needed from his wife.

## 2023-05-16 NOTE — CHART NOTE - NSCHARTNOTEFT_GEN_A_CORE
CAPRINI SCORE [CLOT]    AGE RELATED RISK FACTORS                                                       MOBILITY RELATED FACTORS  [ ] Age 41-60 years                                            (1 Point)                  [ ] Bed rest                                                        (1 Point)  [ ] Age: 61-74 years                                           (2 Points)                 [ ] Plaster cast                                                   (2 Points)  [ x] Age= 75 years                                              (3 Points)                 [ ] Bed bound for more than 72 hours                 (2 Points)    DISEASE RELATED RISK FACTORS                                               GENDER SPECIFIC FACTORS  [ ] Edema in the lower extremities                       (1 Point)                  [ ] Pregnancy                                                     (1 Point)  [ ] Varicose veins                                               (1 Point)                  [ ] Post-partum < 6 weeks                                   (1 Point)             [x ] BMI > 25 Kg/m2                                            (1 Point)                  [ ] Hormonal therapy  or oral contraception          (1 Point)                 [ ] Sepsis (in the previous month)                        (1 Point)                  [ ] History of pregnancy complications                 (1 point)  [ ] Pneumonia or serious lung disease                                               [ ] Unexplained or recurrent                     (1 Point)           (in the previous month)                               (1 Point)  [x ] Abnormal pulmonary function test                     (1 Point)                 SURGERY RELATED RISK FACTORS  [ ] Acute myocardial infarction                              (1 Point)                 [ ]  Section                                             (1 Point)  [ ] Congestive heart failure (in the previous month)  (1 Point)               [ ] Minor surgery                                                  (1 Point)   [ ] Inflammatory bowel disease                             (1 Point)                 [ ] Arthroscopic surgery                                        (2 Points)  [ ] Central venous access                                      (2 Points)                [x ] General surgery lasting more than 45 minutes   (2 Points)       [ ] Stroke (in the previous month)                          (5 Points)               [ ] Elective arthroplasty                                         (5 Points)                                                                                                                                               HEMATOLOGY RELATED FACTORS                                                 TRAUMA RELATED RISK FACTORS  [ ] Prior episodes of VTE                                     (3 Points)                [ ] Fracture of the hip, pelvis, or leg                       (5 Points)  [ ] Positive family history for VTE                         (3 Points)                 [ ] Acute spinal cord injury (in the previous month)  (5 Points)  [ ] Prothrombin 35569 A                                     (3 Points)                 [ ] Paralysis  (less than 1 month)                             (5 Points)  [ ] Factor V Leiden                                             (3 Points)                  [ ] Multiple Trauma within 1 month                        (5 Points)  [ ] Lupus anticoagulants                                     (3 Points)                                                           [ ] Anticardiolipin antibodies                               (3 Points)                                                       [ ] High homocysteine in the blood                      (3 Points)                                             [ ] Other congenital or acquired thrombophilia      (3 Points)                                                [ ] Heparin induced thrombocytopenia                  (3 Points)                                          Total Score [ 6  ]    Will order dopplers BLE    Caprini Score 0 - 2:  Low Risk, No VTE Prophylaxis required for most patients, encourage ambulation  Caprini Score 3 - 6:  At Risk, pharmacologic VTE prophylaxis is indicated for most patients (in the absence of a contraindication)  Caprini Score Greater than or = 7:  High Risk, pharmacologic VTE prophylaxis is indicated for most patients (in the absence of a contraindication)

## 2023-05-16 NOTE — PHYSICAL THERAPY INITIAL EVALUATION ADULT - PLANNED THERAPY INTERVENTIONS, PT EVAL
stair training: GOAL: Pt will negotiate up/down 3 steps with 1 handrail ascending independently in 2 weeks./balance training/bed mobility training/gait training/strengthening/transfer training

## 2023-05-16 NOTE — PHYSICAL THERAPY INITIAL EVALUATION ADULT - TRANSFER TRAINING, PT EVAL
GOAL: Patient will perform sit to stand transfers independently at rolling walker with proper hand placement and sequencing in 2 weeks,

## 2023-05-16 NOTE — PATIENT PROFILE ADULT - FALL HARM RISK - RISK INTERVENTIONS

## 2023-05-17 LAB
A1C WITH ESTIMATED AVERAGE GLUCOSE RESULT: 6 % — HIGH (ref 4–5.6)
ANION GAP SERPL CALC-SCNC: 20 MMOL/L — HIGH (ref 5–17)
BASOPHILS # BLD AUTO: 0.01 K/UL — SIGNIFICANT CHANGE UP (ref 0–0.2)
BASOPHILS NFR BLD AUTO: 0.1 % — SIGNIFICANT CHANGE UP (ref 0–2)
BUN SERPL-MCNC: 19 MG/DL — SIGNIFICANT CHANGE UP (ref 7–23)
CALCIUM SERPL-MCNC: 8.9 MG/DL — SIGNIFICANT CHANGE UP (ref 8.4–10.5)
CHLORIDE SERPL-SCNC: 102 MMOL/L — SIGNIFICANT CHANGE UP (ref 96–108)
CO2 SERPL-SCNC: 21 MMOL/L — LOW (ref 22–31)
CREAT SERPL-MCNC: 1.28 MG/DL — SIGNIFICANT CHANGE UP (ref 0.5–1.3)
EGFR: 57 ML/MIN/1.73M2 — LOW
EOSINOPHIL # BLD AUTO: 0 K/UL — SIGNIFICANT CHANGE UP (ref 0–0.5)
EOSINOPHIL NFR BLD AUTO: 0 % — SIGNIFICANT CHANGE UP (ref 0–6)
ESTIMATED AVERAGE GLUCOSE: 126 MG/DL — HIGH (ref 68–114)
GLUCOSE BLDC GLUCOMTR-MCNC: 118 MG/DL — HIGH (ref 70–99)
GLUCOSE BLDC GLUCOMTR-MCNC: 153 MG/DL — HIGH (ref 70–99)
GLUCOSE BLDC GLUCOMTR-MCNC: 164 MG/DL — HIGH (ref 70–99)
GLUCOSE BLDC GLUCOMTR-MCNC: 177 MG/DL — HIGH (ref 70–99)
GLUCOSE SERPL-MCNC: 149 MG/DL — HIGH (ref 70–99)
HCT VFR BLD CALC: 48.4 % — SIGNIFICANT CHANGE UP (ref 39–50)
HGB BLD-MCNC: 15.8 G/DL — SIGNIFICANT CHANGE UP (ref 13–17)
IMM GRANULOCYTES NFR BLD AUTO: 0.5 % — SIGNIFICANT CHANGE UP (ref 0–0.9)
LYMPHOCYTES # BLD AUTO: 0.61 K/UL — LOW (ref 1–3.3)
LYMPHOCYTES # BLD AUTO: 4.9 % — LOW (ref 13–44)
MCHC RBC-ENTMCNC: 30.6 PG — SIGNIFICANT CHANGE UP (ref 27–34)
MCHC RBC-ENTMCNC: 32.6 GM/DL — SIGNIFICANT CHANGE UP (ref 32–36)
MCV RBC AUTO: 93.6 FL — SIGNIFICANT CHANGE UP (ref 80–100)
MONOCYTES # BLD AUTO: 0.69 K/UL — SIGNIFICANT CHANGE UP (ref 0–0.9)
MONOCYTES NFR BLD AUTO: 5.5 % — SIGNIFICANT CHANGE UP (ref 2–14)
NEUTROPHILS # BLD AUTO: 11.11 K/UL — HIGH (ref 1.8–7.4)
NEUTROPHILS NFR BLD AUTO: 89 % — HIGH (ref 43–77)
NRBC # BLD: 0 /100 WBCS — SIGNIFICANT CHANGE UP (ref 0–0)
PLATELET # BLD AUTO: 248 K/UL — SIGNIFICANT CHANGE UP (ref 150–400)
POTASSIUM SERPL-MCNC: 4.5 MMOL/L — SIGNIFICANT CHANGE UP (ref 3.5–5.3)
POTASSIUM SERPL-SCNC: 4.5 MMOL/L — SIGNIFICANT CHANGE UP (ref 3.5–5.3)
RBC # BLD: 5.17 M/UL — SIGNIFICANT CHANGE UP (ref 4.2–5.8)
RBC # FLD: 14.3 % — SIGNIFICANT CHANGE UP (ref 10.3–14.5)
SODIUM SERPL-SCNC: 143 MMOL/L — SIGNIFICANT CHANGE UP (ref 135–145)
WBC # BLD: 12.48 K/UL — HIGH (ref 3.8–10.5)
WBC # FLD AUTO: 12.48 K/UL — HIGH (ref 3.8–10.5)

## 2023-05-17 RX ORDER — ACETAMINOPHEN 500 MG
975 TABLET ORAL EVERY 8 HOURS
Refills: 0 | Status: DISCONTINUED | OUTPATIENT
Start: 2023-05-17 | End: 2023-05-21

## 2023-05-17 RX ORDER — LIDOCAINE 4 G/100G
1 CREAM TOPICAL ONCE
Refills: 0 | Status: COMPLETED | OUTPATIENT
Start: 2023-05-17 | End: 2023-05-17

## 2023-05-17 RX ADMIN — EZETIMIBE 10 MILLIGRAM(S): 10 TABLET ORAL at 12:44

## 2023-05-17 RX ADMIN — PANTOPRAZOLE SODIUM 40 MILLIGRAM(S): 20 TABLET, DELAYED RELEASE ORAL at 06:33

## 2023-05-17 RX ADMIN — TRAMADOL HYDROCHLORIDE 50 MILLIGRAM(S): 50 TABLET ORAL at 20:32

## 2023-05-17 RX ADMIN — SODIUM CHLORIDE 1000 MILLILITER(S): 9 INJECTION INTRAMUSCULAR; INTRAVENOUS; SUBCUTANEOUS at 08:48

## 2023-05-17 RX ADMIN — HYDROMORPHONE HYDROCHLORIDE 0.5 MILLIGRAM(S): 2 INJECTION INTRAMUSCULAR; INTRAVENOUS; SUBCUTANEOUS at 01:31

## 2023-05-17 RX ADMIN — Medication 40 MILLIGRAM(S): at 06:32

## 2023-05-17 RX ADMIN — TAMSULOSIN HYDROCHLORIDE 0.4 MILLIGRAM(S): 0.4 CAPSULE ORAL at 22:03

## 2023-05-17 RX ADMIN — OXYCODONE HYDROCHLORIDE 5 MILLIGRAM(S): 5 TABLET ORAL at 18:10

## 2023-05-17 RX ADMIN — Medication 400 MILLIGRAM(S): at 06:39

## 2023-05-17 RX ADMIN — OXYCODONE HYDROCHLORIDE 5 MILLIGRAM(S): 5 TABLET ORAL at 19:00

## 2023-05-17 RX ADMIN — METHOCARBAMOL 500 MILLIGRAM(S): 500 TABLET, FILM COATED ORAL at 08:48

## 2023-05-17 RX ADMIN — Medication 1: at 08:47

## 2023-05-17 RX ADMIN — Medication 5 MILLIGRAM(S): at 01:31

## 2023-05-17 RX ADMIN — SENNA PLUS 2 TABLET(S): 8.6 TABLET ORAL at 22:03

## 2023-05-17 RX ADMIN — Medication 1000 MILLIGRAM(S): at 07:42

## 2023-05-17 RX ADMIN — Medication 975 MILLIGRAM(S): at 22:02

## 2023-05-17 RX ADMIN — METHOCARBAMOL 500 MILLIGRAM(S): 500 TABLET, FILM COATED ORAL at 01:31

## 2023-05-17 RX ADMIN — AMLODIPINE BESYLATE 5 MILLIGRAM(S): 2.5 TABLET ORAL at 06:33

## 2023-05-17 RX ADMIN — METHOCARBAMOL 500 MILLIGRAM(S): 500 TABLET, FILM COATED ORAL at 14:45

## 2023-05-17 RX ADMIN — Medication 100 MILLIGRAM(S): at 06:33

## 2023-05-17 RX ADMIN — LIDOCAINE 1 PATCH: 4 CREAM TOPICAL at 18:38

## 2023-05-17 RX ADMIN — TRAMADOL HYDROCHLORIDE 50 MILLIGRAM(S): 50 TABLET ORAL at 21:32

## 2023-05-17 RX ADMIN — Medication 975 MILLIGRAM(S): at 15:40

## 2023-05-17 RX ADMIN — Medication 975 MILLIGRAM(S): at 14:45

## 2023-05-17 RX ADMIN — HYDROMORPHONE HYDROCHLORIDE 0.5 MILLIGRAM(S): 2 INJECTION INTRAMUSCULAR; INTRAVENOUS; SUBCUTANEOUS at 07:42

## 2023-05-17 RX ADMIN — Medication 975 MILLIGRAM(S): at 23:00

## 2023-05-17 RX ADMIN — Medication 1: at 18:10

## 2023-05-17 RX ADMIN — HYDROMORPHONE HYDROCHLORIDE 0.5 MILLIGRAM(S): 2 INJECTION INTRAMUSCULAR; INTRAVENOUS; SUBCUTANEOUS at 06:33

## 2023-05-17 RX ADMIN — HYDROMORPHONE HYDROCHLORIDE 0.5 MILLIGRAM(S): 2 INJECTION INTRAMUSCULAR; INTRAVENOUS; SUBCUTANEOUS at 02:31

## 2023-05-17 RX ADMIN — METHOCARBAMOL 500 MILLIGRAM(S): 500 TABLET, FILM COATED ORAL at 20:31

## 2023-05-17 RX ADMIN — Medication 1: at 12:08

## 2023-05-17 NOTE — OCCUPATIONAL THERAPY INITIAL EVALUATION ADULT - LEVEL OF INDEPENDENCE: SIT/STAND, REHAB EVAL
Subjective:       Patient ID: Óscar Marina is a 53 y.o. male.    Chief Complaint: Erectile Dysfunction (Pt states he cannot get his prosthesis to inflate and when he does it deflates automatically)      HPI: 53-year-old male last seen 12/06/2019 post penile prosthesis placement on 10/24/2019.  Patient presents today stating he is unable to operative prosthesis.  States the bulb is hard.  Denies pain.  Denies difficulty voiding.      Past Medical History:   Past Medical History:   Diagnosis Date    CHF (congestive heart failure)     Depression     DJD (degenerative joint disease)     Hyperlipidemia     Hypogonadism in male     Migraines     PTSD (post-traumatic stress disorder)     TBI (traumatic brain injury)        Past Surgical Historical:   Past Surgical History:   Procedure Laterality Date    APPENDECTOMY      KNEE SURGERY      PENILE PROSTHESIS IMPLANT      SINUS SURGERY          Medications:   Medication List with Changes/Refills   Current Medications    CARBAMAZEPINE (TEGRETOL) 200 MG TABLET        CEPHALEXIN (KEFLEX) 500 MG CAPSULE    TK ONE C PO QID    CIPROFLOXACIN HCL (CIPRO) 500 MG TABLET    TK 1 T PO Q 12 H FOR 5 DAYS    CLOBETASOL 0.05% (TEMOVATE) 0.05 % OINT        DEPO-TESTOSTERONE 200 MG/ML INJECTION        FLUTICASONE PROPIONATE (FLONASE) 50 MCG/ACTUATION NASAL SPRAY        HYDROCODONE-ACETAMINOPHEN (NORCO)  MG PER TABLET        KLOR-CON M20 20 MEQ TABLET        METHOCARBAMOL (ROBAXIN) 500 MG TAB        MONTELUKAST (SINGULAIR) 10 MG TABLET        OXYCODONE-ACETAMINOPHEN (PERCOCET) 7.5-325 MG PER TABLET    TK 1 T PO Q 6 H PRF PAIN    PANTOPRAZOLE (PROTONIX) 40 MG TABLET        PERIOGARD 0.12 % SOLUTION        PROAIR HFA 90 MCG/ACTUATION INHALER        PROMETHAZINE (PHENERGAN) 25 MG TABLET    TK 1 T PO Q 6 H PRF NAUSEA    SIMVASTATIN (ZOCOR) 20 MG TABLET        STIOLTO RESPIMAT 2.5-2.5 MCG/ACTUATION MIST        TOPIRAMATE (TOPAMAX) 100 MG TABLET        VECTICAL 3 MCG/GRAM  OINTMENT            Past Social History:   Social History     Socioeconomic History    Marital status:      Spouse name: Not on file    Number of children: Not on file    Years of education: Not on file    Highest education level: Not on file   Occupational History    Not on file   Social Needs    Financial resource strain: Not on file    Food insecurity:     Worry: Not on file     Inability: Not on file    Transportation needs:     Medical: Not on file     Non-medical: Not on file   Tobacco Use    Smoking status: Current Every Day Smoker     Packs/day: 0.25     Types: Cigarettes    Smokeless tobacco: Never Used   Substance and Sexual Activity    Alcohol use: Not Currently     Frequency: Never    Drug use: Never    Sexual activity: Not on file   Lifestyle    Physical activity:     Days per week: Not on file     Minutes per session: Not on file    Stress: Not on file   Relationships    Social connections:     Talks on phone: Not on file     Gets together: Not on file     Attends [employer] service: Not on file     Active member of club or organization: Not on file     Attends meetings of clubs or organizations: Not on file     Relationship status: Not on file   Other Topics Concern    Not on file   Social History Narrative    Not on file       Allergies:   Review of patient's allergies indicates:   Allergen Reactions    Morphine     Sulfa (sulfonamide antibiotics)         Family History: History reviewed. No pertinent family history.     Review of Systems:  Review of Systems   Constitutional: Negative for activity change and appetite change.   HENT: Negative for congestion and dental problem.    Respiratory: Negative for chest tightness and shortness of breath.    Cardiovascular: Negative for chest pain.   Gastrointestinal: Negative for abdominal distention and abdominal pain.   Genitourinary: Negative for decreased urine volume, difficulty urinating, discharge, dysuria, enuresis, flank  pain, frequency, genital sores, hematuria, penile pain, penile swelling, scrotal swelling, testicular pain and urgency.   Musculoskeletal: Negative for back pain and neck pain.   Neurological: Negative for dizziness.   Hematological: Negative for adenopathy.   Psychiatric/Behavioral: Negative for agitation, behavioral problems and confusion.       Physical Exam:  Physical Exam   Nursing note and vitals reviewed.  Constitutional: He is oriented to person, place, and time. He appears well-developed and well-nourished.   HENT:   Head: Normocephalic.   Cardiovascular: Normal rate, regular rhythm and normal heart sounds.    Pulmonary/Chest: Effort normal and breath sounds normal.   Abdominal: Soft. Bowel sounds are normal.   Genitourinary:   Genitourinary Comments: Was able to inflate and deflate the pole.  At 1 point pulp came lock.  Was able on like it.  Inflate again.  And again became locked.  Helder Jordan NP, was brought in, he was able to inflate and deflate the prosthesis as well.   Neurological: He is alert and oriented to person, place, and time.   Skin: Skin is warm and dry.         Assessment/Plan:   1.  Erectile dysfunction:  Both I and Helder Jordan NP, for able to inflate and deflate the prosthesis.  Explained to the patient how to inflate and deflate.  However patient was very upset with the size of the penis with inflation of the prosthesis.  Patient feels his penis was larger before the prosthesis was placed. Tried to explain to the patient that had probably not likely.  Patient very unsatisfied.  States he will not come back.  We offered to schedule the the patient point with Dr. Akhtar, however the patient declined.  We offered multiple times to schedule the patient with Dr. Akhtar.  Patient states he has wasting his time.  Once again declines a follow-up with Dr. Akhtar.  2.  Hypogonadism:  Patient is on testosterone injections.  He was not due for a follow-up or re-evaluation regarding  hypogonadism.  On last visit labs were drawn showing an elevated testosterone level.  Patient was advised to decrease his testosterone from 200 mg to 150 mg every 2 weeks.  Since the patient states he does not want to come back and see us, I tried to inform the patient to follow up with his primary care doctor.  However the patient left before I could finish explaining the importance of that.  Problem List Items Addressed This Visit     None      Visit Diagnoses     Erectile dysfunction, unspecified erectile dysfunction type    -  Primary    Hypogonadism male                  contact guard

## 2023-05-17 NOTE — OCCUPATIONAL THERAPY INITIAL EVALUATION ADULT - LEVEL OF INDEPENDENCE: STAND/SIT, REHAB EVAL
What Type Of Note Output Would You Prefer (Optional)?: Standard Output How Severe Is Your Rash?: moderate Is This A New Presentation, Or A Follow-Up?: Rash Additional History: Pt has Lichen Sclerosis and uses Clobetasol almost everyday. contact guard

## 2023-05-17 NOTE — OCCUPATIONAL THERAPY INITIAL EVALUATION ADULT - DIAGNOSIS, OT EVAL
pt presents with decreased strength, ROM, endurance, postural control, and balance limiting their ability to engage in ADLs and functional tasks.

## 2023-05-17 NOTE — OCCUPATIONAL THERAPY INITIAL EVALUATION ADULT - PERTINENT HX OF CURRENT PROBLEM, REHAB EVAL
78 year old male with PMH of HTN, HLD, Prediabetes, COPD, lung nodules, FRANCO not on CPAP (states he is unable to tolerate), spinal stenosis, s/p fusion, former smoker, carotid Artery stenosis, S/P Right CEA, patient reports that he was hospitalized at [facility] 2 weeks ago after an episode of blurred vision [place] [address].  He reports that all work-up was negative. Patient reports that he underwent cervical fusion and s/p L3-S1 laminectomy in 2020 after falling a flight of concrete stairs at work in 2000. He reports neck pain exacerbation for the past 1 year and low back pain exacerbation for the past 10 years, and now progressively worsening over time. Patient Low back pain radiates to right thigh and occasionally to ankle and tingling on B/L LE to toes. He denies recent fall or injury. He presents to [facility] today for evaluation prior to scheduled  L2-L3 Posterior Lumbar Laminectomy and Instrumented Spinal Fusion on 5/16/2023.

## 2023-05-18 ENCOUNTER — TRANSCRIPTION ENCOUNTER (OUTPATIENT)
Age: 78
End: 2023-05-18

## 2023-05-18 LAB
ANION GAP SERPL CALC-SCNC: 14 MMOL/L — SIGNIFICANT CHANGE UP (ref 5–17)
BASOPHILS # BLD AUTO: 0 K/UL — SIGNIFICANT CHANGE UP (ref 0–0.2)
BASOPHILS NFR BLD AUTO: 0 % — SIGNIFICANT CHANGE UP (ref 0–2)
BUN SERPL-MCNC: 26 MG/DL — HIGH (ref 7–23)
CALCIUM SERPL-MCNC: 8.7 MG/DL — SIGNIFICANT CHANGE UP (ref 8.4–10.5)
CHLORIDE SERPL-SCNC: 107 MMOL/L — SIGNIFICANT CHANGE UP (ref 96–108)
CO2 SERPL-SCNC: 22 MMOL/L — SIGNIFICANT CHANGE UP (ref 22–31)
CREAT SERPL-MCNC: 1.21 MG/DL — SIGNIFICANT CHANGE UP (ref 0.5–1.3)
EGFR: 61 ML/MIN/1.73M2 — SIGNIFICANT CHANGE UP
EOSINOPHIL # BLD AUTO: 0 K/UL — SIGNIFICANT CHANGE UP (ref 0–0.5)
EOSINOPHIL NFR BLD AUTO: 0 % — SIGNIFICANT CHANGE UP (ref 0–6)
GLUCOSE BLDC GLUCOMTR-MCNC: 141 MG/DL — HIGH (ref 70–99)
GLUCOSE BLDC GLUCOMTR-MCNC: 142 MG/DL — HIGH (ref 70–99)
GLUCOSE BLDC GLUCOMTR-MCNC: 150 MG/DL — HIGH (ref 70–99)
GLUCOSE BLDC GLUCOMTR-MCNC: 151 MG/DL — HIGH (ref 70–99)
GLUCOSE SERPL-MCNC: 106 MG/DL — HIGH (ref 70–99)
HCT VFR BLD CALC: 43.5 % — SIGNIFICANT CHANGE UP (ref 39–50)
HGB BLD-MCNC: 14.6 G/DL — SIGNIFICANT CHANGE UP (ref 13–17)
LYMPHOCYTES # BLD AUTO: 1.68 K/UL — SIGNIFICANT CHANGE UP (ref 1–3.3)
LYMPHOCYTES # BLD AUTO: 11.3 % — LOW (ref 13–44)
MCHC RBC-ENTMCNC: 31.3 PG — SIGNIFICANT CHANGE UP (ref 27–34)
MCHC RBC-ENTMCNC: 33.6 GM/DL — SIGNIFICANT CHANGE UP (ref 32–36)
MCV RBC AUTO: 93.3 FL — SIGNIFICANT CHANGE UP (ref 80–100)
MONOCYTES # BLD AUTO: 1.16 K/UL — HIGH (ref 0–0.9)
MONOCYTES NFR BLD AUTO: 7.8 % — SIGNIFICANT CHANGE UP (ref 2–14)
NEUTROPHILS # BLD AUTO: 12.02 K/UL — HIGH (ref 1.8–7.4)
NEUTROPHILS NFR BLD AUTO: 79.1 % — HIGH (ref 43–77)
PLATELET # BLD AUTO: 231 K/UL — SIGNIFICANT CHANGE UP (ref 150–400)
POTASSIUM SERPL-MCNC: 3.7 MMOL/L — SIGNIFICANT CHANGE UP (ref 3.5–5.3)
POTASSIUM SERPL-SCNC: 3.7 MMOL/L — SIGNIFICANT CHANGE UP (ref 3.5–5.3)
RBC # BLD: 4.66 M/UL — SIGNIFICANT CHANGE UP (ref 4.2–5.8)
RBC # FLD: 14.7 % — HIGH (ref 10.3–14.5)
SODIUM SERPL-SCNC: 143 MMOL/L — SIGNIFICANT CHANGE UP (ref 135–145)
WBC # BLD: 14.86 K/UL — HIGH (ref 3.8–10.5)
WBC # FLD AUTO: 14.86 K/UL — HIGH (ref 3.8–10.5)

## 2023-05-18 PROCEDURE — 93970 EXTREMITY STUDY: CPT | Mod: 26

## 2023-05-18 RX ORDER — TRIAMCINOLONE 4 MG
1 TABLET ORAL ONCE
Refills: 0 | Status: COMPLETED | OUTPATIENT
Start: 2023-05-18 | End: 2023-05-18

## 2023-05-18 RX ORDER — LIDOCAINE HCL 20 MG/ML
4 VIAL (ML) INJECTION ONCE
Refills: 0 | Status: COMPLETED | OUTPATIENT
Start: 2023-05-18 | End: 2023-05-18

## 2023-05-18 RX ORDER — OXYCODONE HYDROCHLORIDE 5 MG/1
5 TABLET ORAL ONCE
Refills: 0 | Status: DISCONTINUED | OUTPATIENT
Start: 2023-05-18 | End: 2023-05-18

## 2023-05-18 RX ADMIN — Medication 40 MILLIGRAM(S): at 05:47

## 2023-05-18 RX ADMIN — EZETIMIBE 10 MILLIGRAM(S): 10 TABLET ORAL at 12:24

## 2023-05-18 RX ADMIN — METHOCARBAMOL 500 MILLIGRAM(S): 500 TABLET, FILM COATED ORAL at 20:28

## 2023-05-18 RX ADMIN — Medication 975 MILLIGRAM(S): at 23:40

## 2023-05-18 RX ADMIN — PANTOPRAZOLE SODIUM 40 MILLIGRAM(S): 20 TABLET, DELAYED RELEASE ORAL at 05:48

## 2023-05-18 RX ADMIN — TAMSULOSIN HYDROCHLORIDE 0.4 MILLIGRAM(S): 0.4 CAPSULE ORAL at 22:52

## 2023-05-18 RX ADMIN — LIDOCAINE 1 PATCH: 4 CREAM TOPICAL at 07:35

## 2023-05-18 RX ADMIN — OXYCODONE HYDROCHLORIDE 5 MILLIGRAM(S): 5 TABLET ORAL at 06:41

## 2023-05-18 RX ADMIN — TRAMADOL HYDROCHLORIDE 50 MILLIGRAM(S): 50 TABLET ORAL at 06:54

## 2023-05-18 RX ADMIN — OXYCODONE HYDROCHLORIDE 5 MILLIGRAM(S): 5 TABLET ORAL at 02:08

## 2023-05-18 RX ADMIN — OXYCODONE HYDROCHLORIDE 5 MILLIGRAM(S): 5 TABLET ORAL at 19:09

## 2023-05-18 RX ADMIN — METHOCARBAMOL 500 MILLIGRAM(S): 500 TABLET, FILM COATED ORAL at 09:32

## 2023-05-18 RX ADMIN — Medication 975 MILLIGRAM(S): at 05:48

## 2023-05-18 RX ADMIN — Medication 975 MILLIGRAM(S): at 07:57

## 2023-05-18 RX ADMIN — Medication 975 MILLIGRAM(S): at 14:51

## 2023-05-18 RX ADMIN — Medication 1 MILLIGRAM(S): at 10:30

## 2023-05-18 RX ADMIN — METHOCARBAMOL 500 MILLIGRAM(S): 500 TABLET, FILM COATED ORAL at 01:08

## 2023-05-18 RX ADMIN — METHOCARBAMOL 500 MILLIGRAM(S): 500 TABLET, FILM COATED ORAL at 14:51

## 2023-05-18 RX ADMIN — OXYCODONE HYDROCHLORIDE 5 MILLIGRAM(S): 5 TABLET ORAL at 18:31

## 2023-05-18 RX ADMIN — Medication 1: at 09:32

## 2023-05-18 RX ADMIN — OXYCODONE HYDROCHLORIDE 5 MILLIGRAM(S): 5 TABLET ORAL at 07:35

## 2023-05-18 RX ADMIN — OXYCODONE HYDROCHLORIDE 5 MILLIGRAM(S): 5 TABLET ORAL at 10:30

## 2023-05-18 RX ADMIN — LIDOCAINE 1 PATCH: 4 CREAM TOPICAL at 07:34

## 2023-05-18 RX ADMIN — Medication 4 MILLILITER(S): at 10:30

## 2023-05-18 RX ADMIN — Medication 975 MILLIGRAM(S): at 15:30

## 2023-05-18 RX ADMIN — Medication 975 MILLIGRAM(S): at 22:51

## 2023-05-18 RX ADMIN — OXYCODONE HYDROCHLORIDE 5 MILLIGRAM(S): 5 TABLET ORAL at 09:32

## 2023-05-18 RX ADMIN — OXYCODONE HYDROCHLORIDE 5 MILLIGRAM(S): 5 TABLET ORAL at 01:08

## 2023-05-18 RX ADMIN — SENNA PLUS 2 TABLET(S): 8.6 TABLET ORAL at 22:52

## 2023-05-18 RX ADMIN — AMLODIPINE BESYLATE 5 MILLIGRAM(S): 2.5 TABLET ORAL at 05:48

## 2023-05-18 RX ADMIN — TRAMADOL HYDROCHLORIDE 50 MILLIGRAM(S): 50 TABLET ORAL at 05:54

## 2023-05-18 NOTE — DISCHARGE NOTE PROVIDER - CARE PROVIDER_API CALL
Waldemar Mcgregor)  Orthopaedic Surgery  [address]  [place] [address]  Phone: [phone]  Fax: [phone]  Follow Up Time:

## 2023-05-18 NOTE — DISCHARGE NOTE PROVIDER - NSDCFUSCHEDAPPT_GEN_ALL_CORE_FT
Waldemar Mcgregor  [facility]  [facility]  Scheduled Appointment: 05/21/2023    Meliton Cruz Physician [facility]  UROLOGY [address] [facility]  Scheduled Appointment: 06/05/2023     Meliton Cruz Physician [facility]  UROLOGY [address] [facility]  Scheduled Appointment: 06/05/2023

## 2023-05-18 NOTE — DISCHARGE NOTE PROVIDER - NSDCCPCAREPLAN_GEN_ALL_CORE_FT
PRINCIPAL DISCHARGE DIAGNOSIS  Diagnosis: Lumbar spinal stenosis due to adjacent segment disease after fusion procedure  Assessment and Plan of Treatment:

## 2023-05-18 NOTE — CONSULT NOTE ADULT - SUBJECTIVE AND OBJECTIVE BOX
Patient is a 78y old  Male who presents with a chief complaint of Left lower extremity pain, weakness (18 May 2023 07:47)      HPI:      PAST MEDICAL & SURGICAL HISTORY:  Hypertension      Hypercholesteremia      COPD (chronic obstructive pulmonary disease)  Bronchiectasis, recent PFT test was normal (as per patient ), followed by Dr. Cheng      GERD (gastroesophageal reflux disease)  Moreno's esophagus      Deviated septum  septum perforation , , inconsequential ,      Other and unspecified ventral hernia with obstruction, without gangrene      Sleep apnea  information from Dr Pierre PCP 12/26/18- does not use CPAP      Former smoker      CKD (chronic kidney disease)      History of tonsillectomy  (Age 12)      History of cataract surgery  (Bilateral eyes)      H/O spinal fusion  2000  rods cervical and lumbar spine      H/O colonoscopy  2015      S/P carotid endarterectomy  (Right, 2/2018)      H/O total knee replacement, right  2021      S/P total knee replacement, left      H/O cervical spine surgery      History of BPH          Review of Systems:   CONSTITUTIONAL: No fever,  or fatigue  NECK: No pain or stiffness  RESPIRATORY: No cough,  No shortness of breath  CARDIOVASCULAR: No chest pain, palpitations, dizziness, or leg swelling  GASTROINTESTINAL: No abdominal  pain. No nausea, vomiting.            Allergies    diphtheria-tetanus toxoids (Anaphylaxis)  penicillin (Anaphylaxis)  flu vaccines (Hives)    Intolerances    opioid-like analgesics (Other)  rosuvastatin (Other)      Social History:     FAMILY HISTORY:  Family history of CHF (congestive heart failure) (Sibling)    MI (myocardial infarction) (Sibling)        MEDICATIONS  (STANDING):  acetaminophen     Tablet .. 975 milliGRAM(s) Oral every 8 hours  amLODIPine   Tablet 5 milliGRAM(s) Oral daily  dextrose 5%. 1000 milliLiter(s) (100 mL/Hr) IV Continuous <Continuous>  dextrose 5%. 1000 milliLiter(s) (50 mL/Hr) IV Continuous <Continuous>  dextrose 50% Injectable 25 Gram(s) IV Push once  dextrose 50% Injectable 12.5 Gram(s) IV Push once  dextrose 50% Injectable 25 Gram(s) IV Push once  ezetimibe 10 milliGRAM(s) Oral daily  glucagon  Injectable 1 milliGRAM(s) IntraMuscular once  insulin lispro (ADMELOG) corrective regimen sliding scale   SubCutaneous three times a day before meals  insulin lispro (ADMELOG) corrective regimen sliding scale   SubCutaneous at bedtime  methocarbamol 500 milliGRAM(s) Oral every 6 hours  pantoprazole    Tablet 40 milliGRAM(s) Oral before breakfast  predniSONE   Tablet   Oral   predniSONE   Tablet 40 milliGRAM(s) Oral daily  senna 2 Tablet(s) Oral at bedtime  sodium chloride 0.9%. 1000 milliLiter(s) (100 mL/Hr) IV Continuous <Continuous>  tamsulosin 0.4 milliGRAM(s) Oral at bedtime    MEDICATIONS  (PRN):  benzocaine/menthol Lozenge 1 Lozenge Oral every 6 hours PRN Sore Throat  bisacodyl 5 milliGRAM(s) Oral every 12 hours PRN Constipation  dextrose Oral Gel 15 Gram(s) Oral once PRN Blood Glucose LESS THAN 70 milliGRAM(s)/deciliter  montelukast 10 milliGRAM(s) Oral daily PRN for allergy symptoms  ondansetron Injectable 4 milliGRAM(s) IV Push every 6 hours PRN Nausea and/or Vomiting  oxyCODONE    IR 5 milliGRAM(s) Oral every 6 hours PRN Severe Pain (7 - 10)  traMADol 25 milliGRAM(s) Oral every 6 hours PRN Mild Pain (1 - 3)  traMADol 50 milliGRAM(s) Oral every 6 hours PRN Moderate Pain (4 - 6)      CAPILLARY BLOOD GLUCOSE      POCT Blood Glucose.: 150 mg/dL (18 May 2023 22:00)  POCT Blood Glucose.: 141 mg/dL (18 May 2023 17:28)  POCT Blood Glucose.: 142 mg/dL (18 May 2023 12:07)  POCT Blood Glucose.: 151 mg/dL (18 May 2023 09:17)    I&O's Summary    17 May 2023 07:01  -  18 May 2023 07:00  --------------------------------------------------------  IN: 720 mL / OUT: 1465 mL / NET: -745 mL    18 May 2023 07:01  -  18 May 2023 23:30  --------------------------------------------------------  IN: 890 mL / OUT: 640 mL / NET: 250 mL        T(C): 36.7 (05-18-23 @ 20:58), Max: 36.8 (05-18-23 @ 16:26)  HR: 84 (05-18-23 @ 20:58) (75 - 84)  BP: 163/79 (05-18-23 @ 20:58) (127/79 - 167/75)  RR: 18 (05-18-23 @ 20:58) (18 - 18)  SpO2: 94% (05-18-23 @ 20:58) (93% - 97%)    PHYSICAL EXAM:    GENERAL: NAD  NECK: Supple, No JVD  CHEST/LUNG: Clear to auscultation bilaterally; No wheezing.  HEART: Regular rate and rhythm; No murmurs, rubs, or gallops  ABDOMEN: Soft, Nontender, Nondistended; Bowel sounds present  EXTREMITIES:  2+ Peripheral Pulses, No edema  NEUROLOGY: AAO      LABS:                        14.6   14.86 )-----------( 231      ( 18 May 2023 07:23 )             43.5     05-18    143  |  107  |  26<H>  ----------------------------<  106<H>  3.7   |  22  |  1.21    Ca    8.7      18 May 2023 07:23              CAPILLARY BLOOD GLUCOSE      POCT Blood Glucose.: 150 mg/dL (18 May 2023 22:00)  POCT Blood Glucose.: 141 mg/dL (18 May 2023 17:28)  POCT Blood Glucose.: 142 mg/dL (18 May 2023 12:07)  POCT Blood Glucose.: 151 mg/dL (18 May 2023 09:17)        RADIOLOGY & ADDITIONAL TESTS:    Imaging Personally Reviewed:    Consultant(s) Notes Reviewed:      Care Discussed with Consultants/Other Providers:    Thanks for consult. Will follow.

## 2023-05-18 NOTE — PHYSICAL EXAM
[Antalgic] : antalgic [NL] : Motor strength of the left lower extremity was normal [Motor Strength Lower Extremities] : right (5/5) [] : Sensory: [L2-RT] : L2 [L3-RT] : L3 [L4-RT] : L4 [0] : left ankle jerk 0 [SLR] : negative straight leg raise [de-identified] : Lumbar ROM:\par TTP L spine and b/l paraspinals lumbar region\par Skin intact L spine. No rashes, ulcers, blisters. \par No lymphedema. \par Rapid alternating movements- intact\par Finger to nose testing- intact\par Full and non-painful ROM RUE, LUE, RLE and LLE. Skin intact RUE, LUE, RLE and LLE. No rashes, blisters, ulcers. NTTP RUE, LUE, RLE and LLE. No evidence of dislocation or subluxation B/L upper and lower extremities.\par Well healed lumbar incision\par  [de-identified] : 	\par EXAM:\par MRI LUMBAR SPINE WITHOUT AND WITH CONTRAST\par \par HISTORY:  Low back pain and paresthesias. Status post fusion.\par \par COMPARISON: MRI lumbar spine from 10/29/2022.\par \par TECHNIQUE:  Sagittal T1, T2 and STIR, and axial T1 and T2 weighted sequences were obtained.   Postcontrast sagittal and axial T1 weighted sequences were obtained.    15 ml of Clariscan was injected from a 20 ml single use vial. Images were acquired on a 1.2 Kaylee MRI.\par \par FINDINGS:\par There are the patient is again noted to be status post laminectomy at L2-3, L3-4 and L4-5 and posterior fusion from L2 to L5 with interbody fusions at L3-4 and L4-5.\par \par At L1-2 just above the fusion level there is degenerative disc narrowing, mild retrolisthesis and a posterior disc osteophyte complex effacing the ventral aspect thecal sac with superimposed facet and ligament flavum hypertrophy. There is moderate central canal stenosis and bilateral foraminal stenosis unchanged compared to prior study.\par \par L5-S1 there is a transitional vertebra with partial there is sacralization of L5.\par \par \par Vertebral body height and marrow signal are normal.  The intravertebral discs demonstrate normal height and signal intensity. The posterior elements are intact. There is no focal disc herniation.  There is no intradural lesion.  The conus medullaris is unremarkable. There is no paraspinal mass. There is no abnormal enhancement or fluid collection.\par \par IMPRESSION:\par \par There is status post laminectomy at L2-3, L3-4 and L4-5 and posterior fusion from L2 to L5 and stable postoperative findings.\par \par Moderate central canal stenosis and bilateral foraminal stenosis at L1-2 there is unchanged.	\par EXAM: CT LUMBAR SPINE WITHOUT CONTRAST\par \par Note - This patient has received 1 CT studies and 0 Myocardial Perfusion studies within our network over the previous 12 month period.\par \par HISTORY:\par Status post fusion. Chronic lower back pain.\par \par TECHNIQUE: 3 mm axial, sagittal coronal images were obtained without intravenous contrast.\par \par One or more of the following dose reduction techniques were used: automated exposure control, adjustment of the mA and/or kV according to patient size, use of iterative reconstruction technique. \par \par FINDINGS:\par \par A comparison is made to the MRI of the lumbar spine from the same day\par 5/13/2023. The patient is status post laminectomy at L2-3, L3-4 and L4-5 and posterior fusion from L2 to L5 with posterior rods present bilaterally and pedicle screws bilaterally from L2 to L5. There is a fracture of the rods bilaterally between L3 and L4 levels with posterior offset of the superior aspect of the ponce with respect to the inferior aspects.\par \par At L2-3 above the fusion level there is degenerative disc narrowing and mild retrolisthesis and osteophyte formation with bilateral foraminal stenosis and moderate central canal stenosis.\par \par The paraspinal soft tissues are within normal limits.\par \par IMPRESSION: Status post multilevel laminectomy with posterior fusion from L2 to L5. There is fracture of the rods bilaterally between the L3 and L4 levels.\par \par Thank you for the opportunity to participate in the care of this patient.

## 2023-05-18 NOTE — DISCHARGE NOTE PROVIDER - NSDCFUADDINST_GEN_ALL_CORE_FT
Continue weight bearing as tolerated ambulation. Keep surgical incision/dressing clean and dry, follow up with Dr. Mcgregor in his office post operative day #14 (5/30/2023)

## 2023-05-18 NOTE — DISCHARGE NOTE PROVIDER - NSDCMRMEDTOKEN_GEN_ALL_CORE_FT
amLODIPine 5 mg oral tablet: 1 tab(s) orally once a day  diclofenac potassium 50 mg oral tablet: 1 tab(s) orally 2 times a day, As Needed  ezetimibe 10 mg oral tablet: 1 orally once a day  omeprazole 40 mg oral delayed release capsule: 1 orally once a day  rolling walker: s/p revision lami L2-3  Singulair 10 mg oral tablet: 1 orally once a day as needed for  allergy symptoms  tamsulosin 0.4 mg oral capsule: 1 orally once a day (at bedtime)   acetaminophen 325 mg oral tablet: 3 tab(s) orally every 8 hours as needed for  mild pain  amLODIPine 5 mg oral tablet: 1 tab(s) orally once a day  cyclobenzaprine 10 mg oral tablet: 1 tab(s) orally every 12 hours as needed for  muscle spasm MDD: 2 Tabs  ezetimibe 10 mg oral tablet: 1 orally once a day  Narcan 4 mg/0.1 mL nasal spray: 4 milligram(s) intranasally every 3 minutes as needed for Overdose Alternate nostrils every 3 minutes as needed  omeprazole 40 mg oral delayed release capsule: 1 orally once a day  oxyCODONE 5 mg oral tablet: 1 tab(s) orally every 4 hours as needed for Severe Pain (7 - 10) MDD: 6 Tabs  predniSONE 10 mg oral tablet: 1 tab(s) orally once a day Follow Steroid Taper: 3 Tabs on 5/22/23, 2 Tabs 5/23-5/25, 1 Tab 5/26-5/28  rolling walker: s/p revision lami L2-3  senna leaf extract oral tablet: 2 tab(s) orally once a day (at bedtime) as needed for  constipation  Singulair 10 mg oral tablet: 1 orally once a day as needed for  allergy symptoms  tamsulosin 0.4 mg oral capsule: 1 orally once a day (at bedtime)  traMADol 50 mg oral tablet: 1 tab(s) orally every 6 hours as needed for Moderate Pain (4 - 6) MDD: 4

## 2023-05-18 NOTE — DISCHARGE NOTE PROVIDER - NSDCFUADDAPPT_GEN_ALL_CORE_FT
Please follow up with Dr. Mcgregor 2 weeks from surgery date.  Please call office to schedule and confirm your appointment date and time.    Please Follow up with your primary care provider in approximately one month from hospital discharge to discuss your recent surgery/admission and for continuum of care.

## 2023-05-18 NOTE — CHART NOTE - NSCHARTNOTEFT_GEN_A_CORE
Procedure Note    Left Greater Trochanter Bursal Injection    Patient seen and examined. Complaining of Left hip pain localized to lateral area of hip. Had pain there previously which resolved last week but has now returned.     After discussing R/B/A, patient elects to obtain a left greater trochanteric bursal steroid injection. Utilizing sterile technique, 1 cc of Kenalog 40 and 4 cc of 1% lidocaine was injected into the left greater troch bursa. Patient tolerated procedure well. NVI after

## 2023-05-18 NOTE — PROVIDER CONTACT NOTE (OTHER) - ASSESSMENT
Bp 167/75 p dizziness, headahce, lightheadedness, blurred vision. Patient complaining of severe L hip pain
pt presenting with -190s in both upper extremities, pt endorses severe pain to RN in L hip. Pt appears stressed, yelling on phone to wife. Received amlodipine 5mg early am

## 2023-05-18 NOTE — CONSULT NOTE ADULT - ASSESSMENT
Patient is a 78y old  Male who presents with a chief complaint of Left lower extremity pain, weakness (18 May 2023 07:47)    S/p Rev Lumbar Lami L2-L3 Mazor  Ortho spine f/up noted  PT f/up  V Doppler: No DVT

## 2023-05-18 NOTE — DISCHARGE NOTE PROVIDER - NSDCADMDATE_GEN_ALL_CORE_FT
16-May-2023 06:38 normal... Well appearing, well nourished, awake, alert, oriented to person, place, time/situation and in no apparent distress.

## 2023-05-18 NOTE — DISCHARGE NOTE PROVIDER - HOSPITAL COURSE
History of Present Illness	  78 year old male with PMH of HTN, HLD, Prediabetes, COPD, lung nodules, FRANCO not on CPAP (states he is unable to tolerate), spinal stenosis, s/p fusion, former smoker, carotid Artery stenosis, S/P Right CEA, patient reports that he was hospitalized at [facility] 2 weeks ago after an episode of [place] [address]  He reports that all work-up was negative. Patient reports that he underwent cervical fusion and s/p L3-S1 laminectomy in 2020 after falling a flight of concrete stairs at work in 2000. He reports neck pain exacerbation for the past 1 year and low back pain exacerbation for the past 10 years, and now progressively worsening over time. Patient Low back pain radiates to right thigh and occasionally to ankle and tingling on B/L LE to toes. He denies recent fall or injury. He presents to [facility] today for evaluation prior to scheduled  L2-L3 Posterior Lumbar Laminectomy and Instrumented Spinal Fusion on 5/16/2023.     Past Medical, Past Surgical History:  PAST MEDICAL HISTORY:  CKD (chronic kidney disease)   COPD (chronic obstructive pulmonary disease) Bronchiectasis, recent PFT test was normal (as per patient ), followed by Dr. Cheng  Deviated septum septum perforation , , inconsequential ,  Former smoker   GERD (gastroesophageal reflux disease) Moreno's esophagus  Hypercholesteremia   Hypertension   Other and unspecified ventral hernia with obstruction, without gangrene   Sleep apnea information from Dr Pierre PCP 12/26/18- does not use CPAP.     PAST SURGICAL HISTORY:  H/O cervical spine surgery   H/O colonoscopy 2015  H/O spinal fusion 2000  rods cervical and lumbar spine  H/O total knee replacement, right 2021  History of BPH   History of cataract surgery (Bilateral eyes)  History of tonsillectomy (Age 12)  S/P carotid endarterectomy (Right, 2/2018)  S/P total knee replacement, left.     HOSPITAL COURSE:  79 y/o M underwent revision Lumbar Laminectomy L2-L3 mazor on 5/16/2023 with Dr. Mcgregor.  Patient tolerated procedure well.  Patient was evaluated postoperatively by physical and occupational therapists for weight bearing as tolerated ambulation and cleared patient for discharge home.  Patient advised to keep surgical incision/dressing clean and dry, and  to follow up with Dr. Mcgregor in his office post operative day #14 (5/30/2023).

## 2023-05-18 NOTE — DISCUSSION/SUMMARY
[de-identified] : 77 yo male with L23 stenosis from adjacent segment degeneration s/p Lumbar Fusion and laminectomy, failed PT, NSAIDS, and ARNALDO, recommend surgical intervention .\par \par I reviewed all major risks, benefits, and complications associated with surgical intervention including but not limited to bleeding, infection, neurological injury, CSF leak, implant failure, implant migration, pedicle screw breech, pseudarthrosis, adjacent segment degeneration, hematoma, anesthetic risk, chronic pain and disability , medical complications (GI, , DVT, PE, cardiac, pulmonary, etc) and risk of mortality.\par \par Posterior Lumbar Laminectomy and instrumented Spinal Fusion L23(5 hours)\par ([id number], [id number], [id number], [id number])\par Mazor\par united\par OARM\par Stealth\par [id number]\par Medtronic\par Electrical midas, Levo\par \par

## 2023-05-18 NOTE — PROVIDER CONTACT NOTE (OTHER) - ACTION/TREATMENT ORDERED:
Patient to receive kenalog and lido injection for pain
provider Claribel at bedside. Oxy 5mgs x 1 ordered. Oxy 5mgs frequency changed from Q 6 to Q 4

## 2023-05-18 NOTE — HISTORY OF PRESENT ILLNESS
[Bending] : worsened by bending [Lifting] : worsened by lifting [Prolonged Sitting] : worsened by prolonged sitting [Prolonged Standing] : worsened by prolonged standing [Acetaminophen] : relieved by acetaminophen [Recumbency] : relieved by recumbency [Rest] : relieved by rest [Home] : at home, [unfilled] , at the time of the visit. [Medical Office: ([facility])___] : at the medical office located in  [de-identified] : Symptoms have worsened since last visit.  ARNALDO and PT have only given temporary relief. \par \par Prior HPI\par \par Pt  is s/p cervical fusion and s/p L3-S1 laminectomy performed by Dr Meyer in 2020 after falling a flight of concrete stairs at work. .  Pt presents with c/o neck pain exacerbation for the past 1 year and low back pain exacerbation  for the past 10 years progressively worsening over time. Pt denies recent injury/falls. Neck pain radiates occasionally to LUE  to wrist associated with intermittent numbness,tingling on LUE. Low back pain radiates to right thigh and occasionally to ankle and tingling on B/L LE to toes. Pt denies  weakness on B/L UE or LE. Pt takes Tylenol, this provides mild pain relief. Pt B/L L3,L4,L5 MBB on 02/22/2023 with >80% relief, b/l L3,4,5 MBB on 12/28/22 with >80% relief , L2-3 LESI on 7/20/22 with 90% relief and L2-3 LESI on 7/20/22 with 90% relief and s/p L2-3 LESI on 8/11/21 with 60% relief , s/p L2-3 LESI on 8/11/21 with 60%, These ARNALDO provided pain relief for 3-4 weeks . Pt f/u with Dr Coon (pain mgt).  Pt does not participate with PT at this time, completed PT in 2020, this provided no pain relief. Pt denies fever, chills, weight changes, loss of bladder control, bowel incontinence or urinary retention or saddle anesthesia\par The patient's past medical history, past surgical history, medications, allergies, and social history were reviewed by me today with the patient and documented accordingly. In addition, the patient's family history, which is noncontributory to this visit, was also reviewed.\par \par \par  [Walking] : not worsened by walking [Physical Therapy] : not relieved by physical therapy [Ataxia] : no ataxia [Incontinence] : no incontinence [Loss of Dexterity] : good dexterity [Urinary Ret.] : no urinary retention [de-identified] : moving head side to side exacerbates pain  [de-identified] : ARNALDO provides some pain relief

## 2023-05-19 LAB
ANION GAP SERPL CALC-SCNC: 18 MMOL/L — HIGH (ref 5–17)
BUN SERPL-MCNC: 27 MG/DL — HIGH (ref 7–23)
CALCIUM SERPL-MCNC: 9 MG/DL — SIGNIFICANT CHANGE UP (ref 8.4–10.5)
CHLORIDE SERPL-SCNC: 104 MMOL/L — SIGNIFICANT CHANGE UP (ref 96–108)
CO2 SERPL-SCNC: 20 MMOL/L — LOW (ref 22–31)
CREAT SERPL-MCNC: 1.29 MG/DL — SIGNIFICANT CHANGE UP (ref 0.5–1.3)
EGFR: 57 ML/MIN/1.73M2 — LOW
GLUCOSE BLDC GLUCOMTR-MCNC: 116 MG/DL — HIGH (ref 70–99)
GLUCOSE BLDC GLUCOMTR-MCNC: 127 MG/DL — HIGH (ref 70–99)
GLUCOSE BLDC GLUCOMTR-MCNC: 139 MG/DL — HIGH (ref 70–99)
GLUCOSE BLDC GLUCOMTR-MCNC: 155 MG/DL — HIGH (ref 70–99)
GLUCOSE BLDC GLUCOMTR-MCNC: 203 MG/DL — HIGH (ref 70–99)
GLUCOSE SERPL-MCNC: 196 MG/DL — HIGH (ref 70–99)
HCT VFR BLD CALC: 49 % — SIGNIFICANT CHANGE UP (ref 39–50)
HGB BLD-MCNC: 16.2 G/DL — SIGNIFICANT CHANGE UP (ref 13–17)
MCHC RBC-ENTMCNC: 30.8 PG — SIGNIFICANT CHANGE UP (ref 27–34)
MCHC RBC-ENTMCNC: 33.1 GM/DL — SIGNIFICANT CHANGE UP (ref 32–36)
MCV RBC AUTO: 93.2 FL — SIGNIFICANT CHANGE UP (ref 80–100)
NRBC # BLD: 0 /100 WBCS — SIGNIFICANT CHANGE UP (ref 0–0)
PLATELET # BLD AUTO: 301 K/UL — SIGNIFICANT CHANGE UP (ref 150–400)
POTASSIUM SERPL-MCNC: 3.8 MMOL/L — SIGNIFICANT CHANGE UP (ref 3.5–5.3)
POTASSIUM SERPL-SCNC: 3.8 MMOL/L — SIGNIFICANT CHANGE UP (ref 3.5–5.3)
RBC # BLD: 5.26 M/UL — SIGNIFICANT CHANGE UP (ref 4.2–5.8)
RBC # FLD: 14.9 % — HIGH (ref 10.3–14.5)
SODIUM SERPL-SCNC: 142 MMOL/L — SIGNIFICANT CHANGE UP (ref 135–145)
WBC # BLD: 13.5 K/UL — HIGH (ref 3.8–10.5)
WBC # FLD AUTO: 13.5 K/UL — HIGH (ref 3.8–10.5)

## 2023-05-19 PROCEDURE — 71045 X-RAY EXAM CHEST 1 VIEW: CPT | Mod: 26

## 2023-05-19 PROCEDURE — 73502 X-RAY EXAM HIP UNI 2-3 VIEWS: CPT | Mod: 26,LT

## 2023-05-19 RX ORDER — HYDRALAZINE HCL 50 MG
10 TABLET ORAL ONCE
Refills: 0 | Status: COMPLETED | OUTPATIENT
Start: 2023-05-19 | End: 2023-05-19

## 2023-05-19 RX ORDER — OXYCODONE HYDROCHLORIDE 5 MG/1
5 TABLET ORAL EVERY 4 HOURS
Refills: 0 | Status: DISCONTINUED | OUTPATIENT
Start: 2023-05-19 | End: 2023-05-21

## 2023-05-19 RX ORDER — LACTULOSE 10 G/15ML
20 SOLUTION ORAL DAILY
Refills: 0 | Status: DISCONTINUED | OUTPATIENT
Start: 2023-05-19 | End: 2023-05-21

## 2023-05-19 RX ORDER — MORPHINE SULFATE 50 MG/1
15 CAPSULE, EXTENDED RELEASE ORAL ONCE
Refills: 0 | Status: DISCONTINUED | OUTPATIENT
Start: 2023-05-19 | End: 2023-05-19

## 2023-05-19 RX ORDER — IPRATROPIUM/ALBUTEROL SULFATE 18-103MCG
3 AEROSOL WITH ADAPTER (GRAM) INHALATION EVERY 6 HOURS
Refills: 0 | Status: DISCONTINUED | OUTPATIENT
Start: 2023-05-19 | End: 2023-05-21

## 2023-05-19 RX ADMIN — OXYCODONE HYDROCHLORIDE 5 MILLIGRAM(S): 5 TABLET ORAL at 21:26

## 2023-05-19 RX ADMIN — OXYCODONE HYDROCHLORIDE 5 MILLIGRAM(S): 5 TABLET ORAL at 20:26

## 2023-05-19 RX ADMIN — TAMSULOSIN HYDROCHLORIDE 0.4 MILLIGRAM(S): 0.4 CAPSULE ORAL at 21:38

## 2023-05-19 RX ADMIN — METHOCARBAMOL 500 MILLIGRAM(S): 500 TABLET, FILM COATED ORAL at 08:50

## 2023-05-19 RX ADMIN — SENNA PLUS 2 TABLET(S): 8.6 TABLET ORAL at 21:39

## 2023-05-19 RX ADMIN — OXYCODONE HYDROCHLORIDE 5 MILLIGRAM(S): 5 TABLET ORAL at 05:34

## 2023-05-19 RX ADMIN — Medication 40 MILLIGRAM(S): at 05:34

## 2023-05-19 RX ADMIN — METHOCARBAMOL 500 MILLIGRAM(S): 500 TABLET, FILM COATED ORAL at 13:26

## 2023-05-19 RX ADMIN — OXYCODONE HYDROCHLORIDE 5 MILLIGRAM(S): 5 TABLET ORAL at 11:40

## 2023-05-19 RX ADMIN — Medication 975 MILLIGRAM(S): at 21:39

## 2023-05-19 RX ADMIN — Medication 975 MILLIGRAM(S): at 05:34

## 2023-05-19 RX ADMIN — Medication 975 MILLIGRAM(S): at 06:10

## 2023-05-19 RX ADMIN — AMLODIPINE BESYLATE 5 MILLIGRAM(S): 2.5 TABLET ORAL at 05:34

## 2023-05-19 RX ADMIN — Medication 3 MILLILITER(S): at 13:40

## 2023-05-19 RX ADMIN — Medication 10 MILLIGRAM(S): at 01:58

## 2023-05-19 RX ADMIN — PANTOPRAZOLE SODIUM 40 MILLIGRAM(S): 20 TABLET, DELAYED RELEASE ORAL at 06:18

## 2023-05-19 RX ADMIN — Medication 975 MILLIGRAM(S): at 14:27

## 2023-05-19 RX ADMIN — Medication 2: at 18:17

## 2023-05-19 RX ADMIN — Medication 3 MILLILITER(S): at 18:23

## 2023-05-19 RX ADMIN — OXYCODONE HYDROCHLORIDE 5 MILLIGRAM(S): 5 TABLET ORAL at 06:10

## 2023-05-19 RX ADMIN — OXYCODONE HYDROCHLORIDE 5 MILLIGRAM(S): 5 TABLET ORAL at 11:10

## 2023-05-19 RX ADMIN — Medication 975 MILLIGRAM(S): at 13:27

## 2023-05-19 RX ADMIN — Medication 975 MILLIGRAM(S): at 22:39

## 2023-05-19 RX ADMIN — METHOCARBAMOL 500 MILLIGRAM(S): 500 TABLET, FILM COATED ORAL at 20:26

## 2023-05-19 RX ADMIN — EZETIMIBE 10 MILLIGRAM(S): 10 TABLET ORAL at 13:41

## 2023-05-19 RX ADMIN — ONDANSETRON 4 MILLIGRAM(S): 8 TABLET, FILM COATED ORAL at 05:37

## 2023-05-20 ENCOUNTER — TRANSCRIPTION ENCOUNTER (OUTPATIENT)
Age: 78
End: 2023-05-20

## 2023-05-20 LAB
GLUCOSE BLDC GLUCOMTR-MCNC: 110 MG/DL — HIGH (ref 70–99)
GLUCOSE BLDC GLUCOMTR-MCNC: 148 MG/DL — HIGH (ref 70–99)
GLUCOSE BLDC GLUCOMTR-MCNC: 158 MG/DL — HIGH (ref 70–99)
GLUCOSE BLDC GLUCOMTR-MCNC: 230 MG/DL — HIGH (ref 70–99)

## 2023-05-20 RX ORDER — LACTULOSE 10 G/15ML
10 SOLUTION ORAL
Refills: 0 | Status: COMPLETED | OUTPATIENT
Start: 2023-05-20 | End: 2023-05-20

## 2023-05-20 RX ADMIN — LACTULOSE 10 GRAM(S): 10 SOLUTION ORAL at 12:55

## 2023-05-20 RX ADMIN — METHOCARBAMOL 500 MILLIGRAM(S): 500 TABLET, FILM COATED ORAL at 16:58

## 2023-05-20 RX ADMIN — LACTULOSE 10 GRAM(S): 10 SOLUTION ORAL at 16:59

## 2023-05-20 RX ADMIN — TAMSULOSIN HYDROCHLORIDE 0.4 MILLIGRAM(S): 0.4 CAPSULE ORAL at 21:34

## 2023-05-20 RX ADMIN — EZETIMIBE 10 MILLIGRAM(S): 10 TABLET ORAL at 12:56

## 2023-05-20 RX ADMIN — OXYCODONE HYDROCHLORIDE 5 MILLIGRAM(S): 5 TABLET ORAL at 16:58

## 2023-05-20 RX ADMIN — Medication 975 MILLIGRAM(S): at 05:54

## 2023-05-20 RX ADMIN — Medication 30 MILLIGRAM(S): at 05:56

## 2023-05-20 RX ADMIN — Medication 2: at 12:56

## 2023-05-20 RX ADMIN — OXYCODONE HYDROCHLORIDE 5 MILLIGRAM(S): 5 TABLET ORAL at 21:34

## 2023-05-20 RX ADMIN — OXYCODONE HYDROCHLORIDE 5 MILLIGRAM(S): 5 TABLET ORAL at 10:03

## 2023-05-20 RX ADMIN — OXYCODONE HYDROCHLORIDE 5 MILLIGRAM(S): 5 TABLET ORAL at 09:33

## 2023-05-20 RX ADMIN — METHOCARBAMOL 500 MILLIGRAM(S): 500 TABLET, FILM COATED ORAL at 21:34

## 2023-05-20 RX ADMIN — Medication 975 MILLIGRAM(S): at 13:35

## 2023-05-20 RX ADMIN — Medication 975 MILLIGRAM(S): at 06:26

## 2023-05-20 RX ADMIN — Medication 975 MILLIGRAM(S): at 13:05

## 2023-05-20 RX ADMIN — Medication 5 MILLIGRAM(S): at 16:59

## 2023-05-20 RX ADMIN — OXYCODONE HYDROCHLORIDE 5 MILLIGRAM(S): 5 TABLET ORAL at 22:15

## 2023-05-20 RX ADMIN — OXYCODONE HYDROCHLORIDE 5 MILLIGRAM(S): 5 TABLET ORAL at 17:21

## 2023-05-20 RX ADMIN — METHOCARBAMOL 500 MILLIGRAM(S): 500 TABLET, FILM COATED ORAL at 09:05

## 2023-05-20 RX ADMIN — AMLODIPINE BESYLATE 5 MILLIGRAM(S): 2.5 TABLET ORAL at 05:56

## 2023-05-20 RX ADMIN — Medication 1: at 08:59

## 2023-05-20 RX ADMIN — PANTOPRAZOLE SODIUM 40 MILLIGRAM(S): 20 TABLET, DELAYED RELEASE ORAL at 05:56

## 2023-05-20 RX ADMIN — LACTULOSE 10 GRAM(S): 10 SOLUTION ORAL at 09:05

## 2023-05-20 RX ADMIN — ONDANSETRON 4 MILLIGRAM(S): 8 TABLET, FILM COATED ORAL at 09:34

## 2023-05-20 NOTE — DISCHARGE NOTE NURSING/CASE MANAGEMENT/SOCIAL WORK - NSDCPEFALRISK_GEN_ALL_CORE
For information on Fall & Injury Prevention, visit: [email or web address] OR  [email or web address] OR  https://www.cdc.gov/steadi/patient.html

## 2023-05-20 NOTE — DISCHARGE NOTE NURSING/CASE MANAGEMENT/SOCIAL WORK - PATIENT PORTAL LINK FT
You can access the FollowMyHealth Patient Portal offered by [facility] by registering at the following website: [email or web address]. By joining [employer]’s FollowMyHealth portal, you will also be able to view your health information using other applications (apps) compatible with our system.

## 2023-05-21 VITALS
HEART RATE: 68 BPM | RESPIRATION RATE: 18 BRPM | OXYGEN SATURATION: 94 % | SYSTOLIC BLOOD PRESSURE: 152 MMHG | TEMPERATURE: 98 F | DIASTOLIC BLOOD PRESSURE: 80 MMHG

## 2023-05-21 LAB — GLUCOSE BLDC GLUCOMTR-MCNC: 294 MG/DL — HIGH (ref 70–99)

## 2023-05-21 PROCEDURE — 93970 EXTREMITY STUDY: CPT

## 2023-05-21 PROCEDURE — 94640 AIRWAY INHALATION TREATMENT: CPT

## 2023-05-21 PROCEDURE — C1889: CPT

## 2023-05-21 PROCEDURE — 73502 X-RAY EXAM HIP UNI 2-3 VIEWS: CPT

## 2023-05-21 PROCEDURE — 71045 X-RAY EXAM CHEST 1 VIEW: CPT

## 2023-05-21 PROCEDURE — 85025 COMPLETE CBC W/AUTO DIFF WBC: CPT

## 2023-05-21 PROCEDURE — C1769: CPT

## 2023-05-21 PROCEDURE — 97161 PT EVAL LOW COMPLEX 20 MIN: CPT

## 2023-05-21 PROCEDURE — 72131 CT LUMBAR SPINE W/O DYE: CPT

## 2023-05-21 PROCEDURE — 76000 FLUOROSCOPY <1 HR PHYS/QHP: CPT

## 2023-05-21 PROCEDURE — 85027 COMPLETE CBC AUTOMATED: CPT

## 2023-05-21 PROCEDURE — S2900: CPT

## 2023-05-21 PROCEDURE — 97110 THERAPEUTIC EXERCISES: CPT

## 2023-05-21 PROCEDURE — 72170 X-RAY EXAM OF PELVIS: CPT

## 2023-05-21 PROCEDURE — 97116 GAIT TRAINING THERAPY: CPT

## 2023-05-21 PROCEDURE — 36415 COLL VENOUS BLD VENIPUNCTURE: CPT

## 2023-05-21 PROCEDURE — 83036 HEMOGLOBIN GLYCOSYLATED A1C: CPT

## 2023-05-21 PROCEDURE — 80048 BASIC METABOLIC PNL TOTAL CA: CPT

## 2023-05-21 PROCEDURE — 97166 OT EVAL MOD COMPLEX 45 MIN: CPT

## 2023-05-21 PROCEDURE — 82962 GLUCOSE BLOOD TEST: CPT

## 2023-05-21 PROCEDURE — 88300 SURGICAL PATH GROSS: CPT

## 2023-05-21 PROCEDURE — 97530 THERAPEUTIC ACTIVITIES: CPT

## 2023-05-21 PROCEDURE — C1713: CPT

## 2023-05-21 RX ORDER — OXYCODONE HYDROCHLORIDE 5 MG/1
1 TABLET ORAL
Qty: 42 | Refills: 0
Start: 2023-05-21 | End: 2023-05-27

## 2023-05-21 RX ORDER — SENNA PLUS 8.6 MG/1
2 TABLET ORAL
Qty: 14 | Refills: 0
Start: 2023-05-21 | End: 2023-05-27

## 2023-05-21 RX ORDER — CYCLOBENZAPRINE HYDROCHLORIDE 10 MG/1
1 TABLET, FILM COATED ORAL
Qty: 14 | Refills: 0
Start: 2023-05-21 | End: 2023-05-27

## 2023-05-21 RX ORDER — NALOXONE HYDROCHLORIDE 4 MG/.1ML
4 SPRAY NASAL
Qty: 1 | Refills: 0
Start: 2023-05-21 | End: 2023-05-27

## 2023-05-21 RX ORDER — ACETAMINOPHEN 500 MG
3 TABLET ORAL
Qty: 0 | Refills: 0 | DISCHARGE
Start: 2023-05-21

## 2023-05-21 RX ORDER — TRAMADOL HYDROCHLORIDE 50 MG/1
1 TABLET ORAL
Qty: 28 | Refills: 0
Start: 2023-05-21 | End: 2023-05-27

## 2023-05-21 RX ORDER — DICLOFENAC SODIUM 75 MG/1
1 TABLET, DELAYED RELEASE ORAL
Qty: 0 | Refills: 0 | DISCHARGE

## 2023-05-21 RX ADMIN — Medication 3: at 08:23

## 2023-05-21 RX ADMIN — METHOCARBAMOL 500 MILLIGRAM(S): 500 TABLET, FILM COATED ORAL at 07:51

## 2023-05-21 RX ADMIN — AMLODIPINE BESYLATE 5 MILLIGRAM(S): 2.5 TABLET ORAL at 06:13

## 2023-05-21 RX ADMIN — METHOCARBAMOL 500 MILLIGRAM(S): 500 TABLET, FILM COATED ORAL at 02:02

## 2023-05-21 RX ADMIN — PANTOPRAZOLE SODIUM 40 MILLIGRAM(S): 20 TABLET, DELAYED RELEASE ORAL at 06:13

## 2023-05-21 RX ADMIN — Medication 975 MILLIGRAM(S): at 06:13

## 2023-05-21 RX ADMIN — Medication 30 MILLIGRAM(S): at 06:12

## 2023-05-21 RX ADMIN — EZETIMIBE 10 MILLIGRAM(S): 10 TABLET ORAL at 11:01

## 2023-05-21 NOTE — PROGRESS NOTE ADULT - SUBJECTIVE AND OBJECTIVE BOX
POST OPERATIVE DAY #:  2    Patient evaluated sitting in chair. Patient reports severe pain at this time since getting OOB.   Patient reports is urinating, denies changes in neurological symptoms.     T(C): 36.5 (05-18-23 @ 05:39), Max: 37 (05-17-23 @ 20:58)  HR: 81 (05-18-23 @ 05:39) (75 - 93)  BP: 167/75 (05-18-23 @ 05:39) (134/62 - 167/75)  RR: 18 (05-18-23 @ 05:39) (18 - 18)  SpO2: 96% (05-18-23 @ 05:39) (95% - 97%)  Wt(kg): --  Exam:   Alert/Oriented, No Acute Distress           Dressing: clean/dry/intact  [ ] Other:           Drains: x1 HV drain- 75/140 output          Sensation: intact to light touch           Motor exam:          [x] Upper extremity                      Bi          Tri        Delt                                                    R         5/5        5/5        5/5                                               L          5/5        5/5        5/5                  [x] Lower extremity                     PF          DF        EHL      FHL                                                                                            R        5/5        5/5        5/5       5/5                                                        L         5/5        5/5        5/5       5/5                                                                  Calves Soft/Non-tender bilaterally          +DP pulses             LABS:                        15.8   12.48 )-----------( 248      ( 17 May 2023 07:25 )             48.4     05-17    143  |  102  |  19  ----------------------------<  149<H>  4.5   |  21<L>  |  1.28    Ca    8.9      17 May 2023 07:25        A/P :  78y Male s/p   -    Pain control - increased frequency of oxycodone to q4 from q6  -    Taper  -    DVT ppx: SCDs       -    Physical Therapy  -    Weight bearing status: WBAT  -    Monitor HV output qshift   -    Bilateral LE doppler pending (Caprini score protocol)  -    Dispo planning     Sonali Ott PA-C  Orthopedic Surgery  Team Pager: #[phone]
POST OPERATIVE DAY #:  [5]     Patient Resting without complaints /events overnight.  T(C): 36.7 (05-21-23 @ 08:08), Max: 36.9 (05-20-23 @ 20:10)  HR: 68 (05-21-23 @ 08:08) (68 - 93)  BP: 152/80 (05-21-23 @ 08:08) (149/81 - 167/76)  RR: 18 (05-21-23 @ 08:08) (16 - 18)  SpO2: 94% (05-21-23 @ 08:08) (93% - 98%)    Exam:   Alert/Oriented, No Acute Distress  Cardiac: RRR, Normal S1/S2, No signs of rubs, gallobs or murmurs.  Pulmonary: Normal depth of breathing, 18bpm, no signs of retraction, no diminished lung sounds, Bronchial/Vesicular lung sounds appreciated throughout all lung lobes.           Dressing: Gauze and surgical film moderately saturated, replaced dressing.           Sensation: intact to light touch throughout BLE          Motor exam:                          Lower extremity           PF          DF         EHL       FHL                                                                               R        5/5        5/5        5/5       5/5                                           L         5/5        5/5        5/5       5/5                                                                  Calves Soft/Non-tender bilaterally          +DP pulses             LABS:                        16.2   13.50 )-----------( 301      ( 19 May 2023 16:12 )             49.0     05-19    142  |  104  |  27<H>  ----------------------------<  196<H>  3.8   |  20<L>  |  1.29    Ca    9.0      19 May 2023 16:12            Bin Terrazas PA-C  Orthopedic Surgery Team  Team Pager: [phone]
Patient comfortable. Reports ambulating to bathroom.   No events overnight.    T(C): 36.9 (05-17-23 @ 04:57), Max: 36.9 (05-17-23 @ 04:57)  HR: 86 (05-17-23 @ 04:57) (63 - 88)  BP: 154/77 (05-17-23 @ 04:57) (129/74 - 173/79)  RR: 18 (05-17-23 @ 04:57) (13 - 18)  SpO2: 95% (05-17-23 @ 04:57) (93% - 100%)  ARJ=220/150    PHYSICAL EXAM:  NAD, Alert and oriented X3  Back: Dressing C/D/I; dull sensation grossly intact to light touch; (+) Distal Pulses; No Calf tenderness B/L, PAS        [x] Upper extremity                    Bi          Tri        Delt                                                    R         5/5        5/5        5/5                                               L          5/5        5/5        5/5             [x] Lower extremity                    PF          DF         EHL       FHL                                                                                            R        5/5        5/5        5/5       5/5                                                        L         5/5        5/5        5/5       5/5      : +Billie in place    LABS:                        14.5   9.02  )-----------( 210      ( 16 May 2023 14:45 )             43.2     05-16    144  |  107  |  20  ----------------------------<  143<H>  4.0   |  22  |  1.24    Ca    8.4      16 May 2023 14:45              
Patient is a 78y old  Male who presents with a chief complaint of Adjacent spine disease L2-L3 Lumbar Laminectomy (19 May 2023 07:20)      SUBJECTIVE / OVERNIGHT EVENTS:    Events noted. Lt hip pain  CONSTITUTIONAL: No fever,  or fatigue  RESPIRATORY: No cough, wheezing,  No shortness of breath  CARDIOVASCULAR: No chest pain, palpitations, dizziness, or leg swelling  GASTROINTESTINAL: No abdominal or epigastric pain.       MEDICATIONS  (STANDING):  acetaminophen     Tablet .. 975 milliGRAM(s) Oral every 8 hours  albuterol/ipratropium for Nebulization 3 milliLiter(s) Nebulizer every 6 hours  amLODIPine   Tablet 5 milliGRAM(s) Oral daily  dextrose 5%. 1000 milliLiter(s) (100 mL/Hr) IV Continuous <Continuous>  dextrose 5%. 1000 milliLiter(s) (50 mL/Hr) IV Continuous <Continuous>  dextrose 50% Injectable 12.5 Gram(s) IV Push once  dextrose 50% Injectable 25 Gram(s) IV Push once  dextrose 50% Injectable 25 Gram(s) IV Push once  ezetimibe 10 milliGRAM(s) Oral daily  glucagon  Injectable 1 milliGRAM(s) IntraMuscular once  insulin lispro (ADMELOG) corrective regimen sliding scale   SubCutaneous three times a day before meals  insulin lispro (ADMELOG) corrective regimen sliding scale   SubCutaneous at bedtime  methocarbamol 500 milliGRAM(s) Oral every 6 hours  pantoprazole    Tablet 40 milliGRAM(s) Oral before breakfast  predniSONE   Tablet   Oral   predniSONE   Tablet 30 milliGRAM(s) Oral daily  senna 2 Tablet(s) Oral at bedtime  sodium chloride 0.9%. 1000 milliLiter(s) (100 mL/Hr) IV Continuous <Continuous>  tamsulosin 0.4 milliGRAM(s) Oral at bedtime    MEDICATIONS  (PRN):  benzocaine/menthol Lozenge 1 Lozenge Oral every 6 hours PRN Sore Throat  bisacodyl 5 milliGRAM(s) Oral every 12 hours PRN Constipation  dextrose Oral Gel 15 Gram(s) Oral once PRN Blood Glucose LESS THAN 70 milliGRAM(s)/deciliter  lactulose Syrup 20 Gram(s) Oral daily PRN constipation  montelukast 10 milliGRAM(s) Oral daily PRN for allergy symptoms  ondansetron Injectable 4 milliGRAM(s) IV Push every 6 hours PRN Nausea and/or Vomiting  oxyCODONE    IR 5 milliGRAM(s) Oral every 4 hours PRN Severe Pain (7 - 10)  traMADol 25 milliGRAM(s) Oral every 6 hours PRN Mild Pain (1 - 3)  traMADol 50 milliGRAM(s) Oral every 6 hours PRN Moderate Pain (4 - 6)        CAPILLARY BLOOD GLUCOSE      POCT Blood Glucose.: 127 mg/dL (19 May 2023 21:20)  POCT Blood Glucose.: 203 mg/dL (19 May 2023 17:51)  POCT Blood Glucose.: 116 mg/dL (19 May 2023 13:54)  POCT Blood Glucose.: 155 mg/dL (19 May 2023 12:13)  POCT Blood Glucose.: 139 mg/dL (19 May 2023 08:56)    I&O's Summary    18 May 2023 07:01  -  19 May 2023 07:00  --------------------------------------------------------  IN: 890 mL / OUT: 1248 mL / NET: -358 mL        T(C): 36.7 (05-19-23 @ 20:02), Max: 37 (05-19-23 @ 12:26)  HR: 85 (05-19-23 @ 20:02) (78 - 85)  BP: 162/71 (05-19-23 @ 20:02) (130/68 - 167/77)  RR: 18 (05-19-23 @ 20:02) (16 - 18)  SpO2: 94% (05-19-23 @ 20:02) (93% - 98%)    PHYSICAL EXAM:  GENERAL: NAD  NECK: Supple, No JVD  CHEST/LUNG: Clear to auscultation bilaterally; No wheezing.  HEART: Regular rate and rhythm; No murmurs, rubs, or gallops  ABDOMEN: Soft, Nontender, Nondistended; Bowel sounds present  EXTREMITIES:   No edema  NEUROLOGY: AAO X 3      LABS:                        16.2   13.50 )-----------( 301      ( 19 May 2023 16:12 )             49.0     05-19    142  |  104  |  27<H>  ----------------------------<  196<H>  3.8   |  20<L>  |  1.29    Ca    9.0      19 May 2023 16:12              CAPILLARY BLOOD GLUCOSE      POCT Blood Glucose.: 127 mg/dL (19 May 2023 21:20)  POCT Blood Glucose.: 203 mg/dL (19 May 2023 17:51)  POCT Blood Glucose.: 116 mg/dL (19 May 2023 13:54)  POCT Blood Glucose.: 155 mg/dL (19 May 2023 12:13)  POCT Blood Glucose.: 139 mg/dL (19 May 2023 08:56)        RADIOLOGY & ADDITIONAL TESTS:    Imaging Personally Reviewed:    Consultant(s) Notes Reviewed:      Care Discussed with Consultants/Other Providers:    Ricardo Gill MD, CMD, FACP    [address] [place] [address]  Office Tel: [phone]  Cell: [phone]  
Patient is a 78y old  Male who presents with a chief complaint of Adjacent spine disease resulting in right L/E weakness  Patient s/p Revision Lumbar Laminectomy L2-L3 (meme) POD#4   Patient c/o pain in abdomen, lower back, Constipation, no BM since 5/15/2023    T(C): 37 (05-20-23 @ 05:24), Max: 37 (05-19-23 @ 12:26)  HR: 79 (05-20-23 @ 05:24) (78 - 85)  BP: 153/77 (05-20-23 @ 05:24) (151/68 - 167/77)  RR: 18 (05-20-23 @ 05:24) (18 - 18)  SpO2: 94% (05-20-23 @ 05:24) (93% - 98%)    PHYSICAL EXAM:  NAD, Alert  Back: Dressing C/D/I;; (+) Distal Pulses; No Calf tenderness B/L, PAS              [ ] Lower extremeity                  PF          DF         EHL       FHL                                                                                            R        5/5 5/5        5/5       5/5                                                        L         5/5        5/5        5/5       5/5               16.2   13.50 )-----------( 301      ( 19 May 2023 16:12 )             49.0   05-19  142  |  104  |  27<H>  ----------------------------<  196<H>  3.8   |  20<L>  |  1.29  Ca    9.0      19 May 2023 16:12      
Patient is a 78y old  Male who presents with a chief complaint of Right lower extremity pain (20 May 2023 07:20)      SUBJECTIVE / OVERNIGHT EVENTS:    Events noted.  CONSTITUTIONAL: No fever,  or fatigue  RESPIRATORY: No cough, wheezing,  No shortness of breath  CARDIOVASCULAR: No chest pain, palpitations, dizziness, or leg swelling  GASTROINTESTINAL: No abdominal or epigastric pain.   NEUROLOGICAL: No headache    MEDICATIONS  (STANDING):  acetaminophen     Tablet .. 975 milliGRAM(s) Oral every 8 hours  albuterol/ipratropium for Nebulization 3 milliLiter(s) Nebulizer every 6 hours  amLODIPine   Tablet 5 milliGRAM(s) Oral daily  dextrose 5%. 1000 milliLiter(s) (50 mL/Hr) IV Continuous <Continuous>  dextrose 5%. 1000 milliLiter(s) (100 mL/Hr) IV Continuous <Continuous>  dextrose 50% Injectable 12.5 Gram(s) IV Push once  dextrose 50% Injectable 25 Gram(s) IV Push once  dextrose 50% Injectable 25 Gram(s) IV Push once  ezetimibe 10 milliGRAM(s) Oral daily  glucagon  Injectable 1 milliGRAM(s) IntraMuscular once  insulin lispro (ADMELOG) corrective regimen sliding scale   SubCutaneous three times a day before meals  insulin lispro (ADMELOG) corrective regimen sliding scale   SubCutaneous at bedtime  lactulose Syrup 10 Gram(s) Oral every 3 hours  methocarbamol 500 milliGRAM(s) Oral every 6 hours  pantoprazole    Tablet 40 milliGRAM(s) Oral before breakfast  predniSONE   Tablet   Oral   predniSONE   Tablet 30 milliGRAM(s) Oral daily  senna 2 Tablet(s) Oral at bedtime  sodium chloride 0.9%. 1000 milliLiter(s) (100 mL/Hr) IV Continuous <Continuous>  tamsulosin 0.4 milliGRAM(s) Oral at bedtime    MEDICATIONS  (PRN):  benzocaine/menthol Lozenge 1 Lozenge Oral every 6 hours PRN Sore Throat  bisacodyl 5 milliGRAM(s) Oral every 12 hours PRN Constipation  bisacodyl Suppository 10 milliGRAM(s) Rectal daily PRN Constipation  dextrose Oral Gel 15 Gram(s) Oral once PRN Blood Glucose LESS THAN 70 milliGRAM(s)/deciliter  lactulose Syrup 20 Gram(s) Oral daily PRN constipation  montelukast 10 milliGRAM(s) Oral daily PRN for allergy symptoms  ondansetron Injectable 4 milliGRAM(s) IV Push every 6 hours PRN Nausea and/or Vomiting  oxyCODONE    IR 5 milliGRAM(s) Oral every 4 hours PRN Severe Pain (7 - 10)  traMADol 25 milliGRAM(s) Oral every 6 hours PRN Mild Pain (1 - 3)  traMADol 50 milliGRAM(s) Oral every 6 hours PRN Moderate Pain (4 - 6)        CAPILLARY BLOOD GLUCOSE      POCT Blood Glucose.: 148 mg/dL (20 May 2023 17:30)  POCT Blood Glucose.: 230 mg/dL (20 May 2023 12:02)  POCT Blood Glucose.: 158 mg/dL (20 May 2023 08:23)  POCT Blood Glucose.: 127 mg/dL (19 May 2023 21:20)    I&O's Summary    19 May 2023 07:01  -  20 May 2023 07:00  --------------------------------------------------------  IN: 420 mL / OUT: 300 mL / NET: 120 mL    20 May 2023 07:01  -  20 May 2023 19:21  --------------------------------------------------------  IN: 480 mL / OUT: 150 mL / NET: 330 mL        T(C): 36.5 (05-20-23 @ 12:21), Max: 37 (05-20-23 @ 05:24)  HR: 93 (05-20-23 @ 12:21) (73 - 93)  BP: 149/81 (05-20-23 @ 12:21) (149/73 - 162/71)  RR: 18 (05-20-23 @ 12:21) (18 - 18)  SpO2: 93% (05-20-23 @ 12:21) (93% - 94%)    PHYSICAL EXAM:  GENERAL: NAD  NECK: Supple, No JVD  CHEST/LUNG: Clear to auscultation bilaterally; No wheezing.  HEART: Regular rate and rhythm; No murmurs, rubs, or gallops  ABDOMEN: Soft, Nontender, Nondistended; Bowel sounds present  EXTREMITIES:   No edema  NEUROLOGY: AAO X 3      LABS:                        16.2   13.50 )-----------( 301      ( 19 May 2023 16:12 )             49.0     05-19    142  |  104  |  27<H>  ----------------------------<  196<H>  3.8   |  20<L>  |  1.29    Ca    9.0      19 May 2023 16:12              CAPILLARY BLOOD GLUCOSE      POCT Blood Glucose.: 148 mg/dL (20 May 2023 17:30)  POCT Blood Glucose.: 230 mg/dL (20 May 2023 12:02)  POCT Blood Glucose.: 158 mg/dL (20 May 2023 08:23)  POCT Blood Glucose.: 127 mg/dL (19 May 2023 21:20)        RADIOLOGY & ADDITIONAL TESTS:    Imaging Personally Reviewed:    Consultant(s) Notes Reviewed:      Care Discussed with Consultants/Other Providers:    Ricardo Gill MD, CMD, FACP    [address] [place] [address]  Office Tel: [phone]  Cell: [phone]  
 ORTHO  Patient is a 78y old  Male who presents with a chief complaint of Left lower extremity pain, weakness (18 May 2023 07:47)    Pt. resting with complaint, left hip, scrotal pain and nausa    VS-  T(C): 36.7 (05-19-23 @ 05:32), Max: 36.8 (05-18-23 @ 16:26)  HR: 79 (05-19-23 @ 05:32) (78 - 84)  BP: 160/67 (05-19-23 @ 05:32) (127/79 - 195/79)  RR: 16 (05-19-23 @ 05:32) (16 - 18)  SpO2: 94% (05-19-23 @ 05:32) (91% - 97%)  Wt(kg): --    M.S. A&O  SOB @ times  Lungs- Clear   EDWIN- RRR  Abd- (+)BS, soft, min-distention, NT  Lower back- dressing C/D/I, Hemovac- disconnected(contaminated),                      dressing changed drain d/c' d tip intact   Neuro-              Motor- (+)Ankle- DF/PF              Sensation- grossly intact to light touch              Calves- soft, nontender- PAS                               14.6   14.86 )-----------( 231      ( 18 May 2023 07:23 )             43.5     05-18    143  |  107  |  26<H>  ----------------------------<  106<H>  3.7   |  22  |  1.21    Ca    8.7      18 May 2023 07:23

## 2023-05-21 NOTE — PROGRESS NOTE ADULT - REASON FOR ADMISSION
Revision Lumbar Laminectomy L2-L3
s/p revision lumbar Lami L2-3 with PETERSON
Adjacent spine disease L2-L3 Lumbar Laminectomy
Right lower extremity pain

## 2023-05-21 NOTE — PROGRESS NOTE ADULT - ASSESSMENT
A/P :  78y Male s/p Revision Lumbar Laminectomy L2-L3. VSS, NAD  -    Pain control  -    DVT ppx: SCDs       -    Physical Therapy  -    Weight bearing status: WBAT  -    Discharge to home today, medically clear and stable.
A/P: 79 y/o M POD#1 s/p Rev Lumbar Lami L2-L3 Mazor    DVT ppx- IPC, ambulation as tolerated  Pain management prn  PT  OT  Gi ppx  HMV to self suction  D/C Wise today-->FU TOV  B/L LE Doppler ordered and pending  D/W attending      LUTHER Connell  Orthopedic Surgery  [id number]  
Patient is a 78y old  Male who presents with a chief complaint of Left lower extremity pain, weakness (18 May 2023 07:47)    S/p Rev Lumbar Lami L2-L3 Mazor  Ortho spine f/up noted  PT f/up  V Doppler: No DVT    Lt hip pain:    X ray Lt hip    
Patient is a 78y old  Male who presents with a chief complaint of Left lower extremity pain, weakness (18 May 2023 07:47)    S/p Rev Lumbar Lami L2-L3 Mazor  Ortho spine f/up noted  PT f/up  V Doppler: No DVT    Lt hip pain:    X ray Lt hip: No acute path    
   Impression: Stable       Plan:   Continue present treatment                 CXR this am.                 Out of bed, ambulate as tolerated                 Physical therapy follow up                 Continue to monitor    Trip Lawler PA-C  Orthopaedic Surgery  Team pager [phone]  [phone] 
77 y/o M  s/p Revision Lumbar Laminectomy L2-L3 (meme) POD#4, Lactulose, d/c planning  Fatmata GALEC  Orthopaedic Surgery  Team pager [phone]  [facility] [phone]  [phone]

## 2023-05-22 ENCOUNTER — NON-APPOINTMENT (OUTPATIENT)
Age: 78
End: 2023-05-22

## 2023-05-23 PROBLEM — N18.9 CHRONIC KIDNEY DISEASE, UNSPECIFIED: Chronic | Status: ACTIVE | Noted: 2023-05-04

## 2023-05-25 ENCOUNTER — NON-APPOINTMENT (OUTPATIENT)
Age: 78
End: 2023-05-25

## 2023-05-30 ENCOUNTER — NON-APPOINTMENT (OUTPATIENT)
Age: 78
End: 2023-05-30

## 2023-06-05 ENCOUNTER — APPOINTMENT (OUTPATIENT)
Dept: UROLOGY | Facility: CLINIC | Age: 78
End: 2023-06-05
Payer: MEDICARE

## 2023-06-05 ENCOUNTER — APPOINTMENT (OUTPATIENT)
Dept: ORTHOPEDIC SURGERY | Facility: CLINIC | Age: 78
End: 2023-06-05
Payer: MEDICARE

## 2023-06-05 VITALS
DIASTOLIC BLOOD PRESSURE: 78 MMHG | TEMPERATURE: 97.8 F | OXYGEN SATURATION: 97 % | SYSTOLIC BLOOD PRESSURE: 136 MMHG | HEART RATE: 69 BPM

## 2023-06-05 PROCEDURE — 99213 OFFICE O/P EST LOW 20 MIN: CPT

## 2023-06-05 PROCEDURE — 99024 POSTOP FOLLOW-UP VISIT: CPT

## 2023-06-05 PROCEDURE — 51798 US URINE CAPACITY MEASURE: CPT

## 2023-06-05 PROCEDURE — 72100 X-RAY EXAM L-S SPINE 2/3 VWS: CPT

## 2023-06-05 RX ORDER — TRAMADOL HYDROCHLORIDE 50 MG/1
50 TABLET, COATED ORAL 4 TIMES DAILY
Qty: 120 | Refills: 0 | Status: ACTIVE | COMMUNITY
Start: 2023-06-05 | End: 1900-01-01

## 2023-06-06 LAB — SURGICAL PATHOLOGY STUDY: SIGNIFICANT CHANGE UP

## 2023-06-17 LAB — PSA SERPL-MCNC: 2.48 NG/ML

## 2023-06-17 NOTE — ASSESSMENT
[FreeTextEntry1] : 79 y/o male w LUTS and proteinuria\par \par PSA sent today\par \par pt is voiding well,\par PVR today: 0\par 2/21/2023, 27\par 1/9/2023, 91\par 3/22/2022, 29\par \par continue w tamsulosin 0.4 mg\par \par based on labs, f/u in 1 yr for PVR, exam, IPSS, and PSA

## 2023-06-17 NOTE — HISTORY OF PRESENT ILLNESS
[FreeTextEntry1] : 78 year old male w h/o LUTS and significant proteinuria\par \par   is taking tamsulosin 0.4 mg,\par \par urination has improved,\par denies hematuria and dysuria\par \par   IPSS today: 5\par 1/9/2023, 28 \par 3/22/2022, 7  \par \par went to nephrologist- labs 2/25/2023 w Avtar-\par high urine albumin/creatinine ratio\par high serum creatinine\par r\par l\par high urine protein/creatinine ratio\par high urine protein\par \par PSA: 7/10/2022, 3.81\par  8/31/2021, 2.75\par  5/3/2021, 3.33

## 2023-06-17 NOTE — END OF VISIT
[FreeTextEntry3] : All medical record entries made by the scribe today, were at my direction and personally dictated to them by me, Dr. Meliton Cruz on 06/05/2023. I have reviewed the chart and agree that the record accurately reflects my personal performance of the history, physical exam, assessment, and plan. I have also personally directed, reviewed, and agreed with the chart.\par

## 2023-06-20 ENCOUNTER — NON-APPOINTMENT (OUTPATIENT)
Age: 78
End: 2023-06-20

## 2023-07-06 ENCOUNTER — APPOINTMENT (OUTPATIENT)
Dept: UROLOGY | Facility: CLINIC | Age: 78
End: 2023-07-06
Payer: MEDICARE

## 2023-07-06 VITALS
HEIGHT: 66 IN | TEMPERATURE: 97.5 F | OXYGEN SATURATION: 98 % | BODY MASS INDEX: 25.88 KG/M2 | WEIGHT: 161 LBS | RESPIRATION RATE: 16 BRPM | HEART RATE: 61 BPM | SYSTOLIC BLOOD PRESSURE: 123 MMHG | DIASTOLIC BLOOD PRESSURE: 68 MMHG

## 2023-07-06 DIAGNOSIS — R35.1 BENIGN PROSTATIC HYPERPLASIA WITH LOWER URINARY TRACT SYMPMS: ICD-10-CM

## 2023-07-06 DIAGNOSIS — Z87.891 PERSONAL HISTORY OF NICOTINE DEPENDENCE: ICD-10-CM

## 2023-07-06 DIAGNOSIS — Z78.9 OTHER SPECIFIED HEALTH STATUS: ICD-10-CM

## 2023-07-06 DIAGNOSIS — N40.1 BENIGN PROSTATIC HYPERPLASIA WITH LOWER URINARY TRACT SYMPMS: ICD-10-CM

## 2023-07-06 DIAGNOSIS — N39.41 URGE INCONTINENCE: ICD-10-CM

## 2023-07-06 PROCEDURE — 99214 OFFICE O/P EST MOD 30 MIN: CPT | Mod: 25

## 2023-07-06 PROCEDURE — 51798 US URINE CAPACITY MEASURE: CPT

## 2023-07-06 NOTE — ASSESSMENT
[FreeTextEntry1] : 6/5/23  PSA 2.48 and stable\par \par consider repeating the sleep study as there are much smaller devices available, including some ?implantable devices, in certain situations\par \par This is sure to help his nocturia. He should speak with his PCP\par \par Tends not to drink after 8  pm and he reclines at 10 pm, but occ has a drink of milk at 10, just prior to sleeping.\par \par Told to hold his fluids for 3 hrs prior to bed\par \par PVR 3 ml so he does empty\par \par Will keep Tamsulosin 0.4 mg po qd, and discussed OAB meds.\par \par UA, Cx and cytology sent due to his new and worsened complaints.\par \par He wishes to f/ in 6 mo, instead of a yr: IPSS, SHANTAL, PSA, FLOW\par Pt is new to me.\par \par 38 min spent

## 2023-07-06 NOTE — LETTER BODY
[Dear  ___] : Dear  [unfilled], [Consult Letter:] : I had the pleasure of evaluating your patient, [unfilled]. [Please see my note below.] : Please see my note below. [Consult Closing:] : Thank you very much for allowing me to participate in the care of this patient.  If you have any questions, please do not hesitate to contact me. [FreeTextEntry1] : Please see my note. I will keep you informed. [FreeTextEntry3] : Sincerely,\par \par Maddie Kathleen MD\par Clinical \par [facility]\par [facility]\par

## 2023-07-06 NOTE — HISTORY OF PRESENT ILLNESS
[FreeTextEntry1] : DHRUV DONOHUE is a 78 year old M who presents with nocturia x7\par \par States he was diagnosed 10 yrs ago with FRANCO, but couldn't tolerate the CPAP machine.\par \par He is a former patient of Dr. Cruz and drinks large amounts of fluid and voids approx q 1 hr with occasional double voiding.  He is concerned, as in the last couple of months, which he forgot to tell Dr. BRAUN, he has had urgency at night with urinary urge incontinence with nighttime voids only. This apparently does not happen during the daytime.\par \par He has COPD and FRANCO (untreated); these are two of the most common causes of nocturia and these meds for OAB, tend not to improve night time voiding anyway.  He apparently took\par \par

## 2023-07-07 LAB
APPEARANCE: CLEAR
BACTERIA: NEGATIVE /HPF
BILIRUBIN URINE: NEGATIVE
BLOOD URINE: NEGATIVE
CAST: 1 /LPF
COLOR: YELLOW
EPITHELIAL CELLS: 0 /HPF
GLUCOSE QUALITATIVE U: NEGATIVE MG/DL
KETONES URINE: NEGATIVE MG/DL
LEUKOCYTE ESTERASE URINE: NEGATIVE
MICROSCOPIC-UA: NORMAL
NITRITE URINE: NEGATIVE
PH URINE: 6
PROTEIN URINE: 300 MG/DL
RED BLOOD CELLS URINE: 1 /HPF
SPECIFIC GRAVITY URINE: 1.02
UROBILINOGEN URINE: 0.2 MG/DL
WHITE BLOOD CELLS URINE: 0 /HPF

## 2023-07-08 LAB
BACTERIA UR CULT: NORMAL
URINE CYTOLOGY: NORMAL

## 2023-07-17 ENCOUNTER — APPOINTMENT (OUTPATIENT)
Dept: ORTHOPEDIC SURGERY | Facility: CLINIC | Age: 78
End: 2023-07-17

## 2023-07-19 ENCOUNTER — APPOINTMENT (OUTPATIENT)
Dept: ORTHOPEDIC SURGERY | Facility: CLINIC | Age: 78
End: 2023-07-19
Payer: MEDICARE

## 2023-07-19 VITALS — HEIGHT: 66 IN | BODY MASS INDEX: 25.88 KG/M2 | WEIGHT: 161 LBS

## 2023-07-19 PROCEDURE — 99024 POSTOP FOLLOW-UP VISIT: CPT

## 2023-07-19 PROCEDURE — 72100 X-RAY EXAM L-S SPINE 2/3 VWS: CPT

## 2023-08-29 ENCOUNTER — APPOINTMENT (OUTPATIENT)
Dept: ORTHOPEDIC SURGERY | Facility: CLINIC | Age: 78
End: 2023-08-29
Payer: MEDICARE

## 2023-08-29 VITALS — BODY MASS INDEX: 25.88 KG/M2 | HEIGHT: 66 IN | WEIGHT: 161 LBS

## 2023-08-29 PROCEDURE — 99214 OFFICE O/P EST MOD 30 MIN: CPT

## 2023-08-29 PROCEDURE — 72100 X-RAY EXAM L-S SPINE 2/3 VWS: CPT

## 2023-08-29 RX ORDER — PREDNISONE 10 MG/1
10 TABLET ORAL
Qty: 30 | Refills: 0 | Status: ACTIVE | COMMUNITY
Start: 2023-08-29 | End: 1900-01-01

## 2023-09-08 ENCOUNTER — APPOINTMENT (OUTPATIENT)
Dept: ORTHOPEDIC SURGERY | Facility: CLINIC | Age: 78
End: 2023-09-08
Payer: MEDICARE

## 2023-09-08 VITALS — BODY MASS INDEX: 25.88 KG/M2 | HEIGHT: 66 IN | WEIGHT: 161 LBS

## 2023-09-08 VITALS — HEIGHT: 66 IN | BODY MASS INDEX: 25.88 KG/M2 | WEIGHT: 161 LBS

## 2023-09-08 PROCEDURE — 99214 OFFICE O/P EST MOD 30 MIN: CPT

## 2023-09-14 ENCOUNTER — APPOINTMENT (OUTPATIENT)
Dept: ORTHOPEDIC SURGERY | Facility: CLINIC | Age: 78
End: 2023-09-14
Payer: MEDICARE

## 2023-09-14 VITALS — HEIGHT: 66 IN | BODY MASS INDEX: 25.88 KG/M2 | WEIGHT: 161 LBS

## 2023-09-14 DIAGNOSIS — M25.552 PAIN IN RIGHT HIP: ICD-10-CM

## 2023-09-14 DIAGNOSIS — M25.551 PAIN IN RIGHT HIP: ICD-10-CM

## 2023-09-14 PROCEDURE — 73523 X-RAY EXAM HIPS BI 5/> VIEWS: CPT

## 2023-09-14 PROCEDURE — 99214 OFFICE O/P EST MOD 30 MIN: CPT

## 2023-09-21 ASSESSMENT — KOOS JR
BENDING TO THE FLOOR TO PICK UP OBJECT: MODERATE
IMPORTED KOOS JR SCORE: 13.0
GOING UP OR DOWN STAIRS: MODERATE
TWISING OR PIVOTING ON KNEE: MODERATE
HOW SEVERE IS YOUR KNEE STIFFNESS AFTER FIRST WAKING IN MORNING: MODERATE
IMPORTED FORM: YES
RISING FROM SITTING: MODERATE
STRAIGHTENING KNEE FULLY: MILD
KOOS JR RAW SCORE: 13
STANDING UPRIGHT: MODERATE

## 2023-11-01 NOTE — PROVIDER CONTACT NOTE (MEDICATION) - ACTION/TREATMENT ORDERED:
MD at bedside to assess Pt. Pt neurologically intact, pt reports symptoms resolved prior to doctor arriving. Pt educated on s/s of stroke and provided with stroke materials. Will continue to monitor. 119

## 2023-12-01 ENCOUNTER — APPOINTMENT (OUTPATIENT)
Dept: ORTHOPEDIC SURGERY | Facility: CLINIC | Age: 78
End: 2023-12-01
Payer: MEDICARE

## 2023-12-01 VITALS — WEIGHT: 161 LBS | HEIGHT: 66 IN | BODY MASS INDEX: 25.88 KG/M2

## 2023-12-01 DIAGNOSIS — M54.6 PAIN IN THORACIC SPINE: ICD-10-CM

## 2023-12-01 PROCEDURE — 72070 X-RAY EXAM THORAC SPINE 2VWS: CPT

## 2023-12-01 PROCEDURE — 99215 OFFICE O/P EST HI 40 MIN: CPT

## 2023-12-01 PROCEDURE — 72100 X-RAY EXAM L-S SPINE 2/3 VWS: CPT

## 2023-12-05 NOTE — ASU PATIENT PROFILE, ADULT - PSH
H/O colonoscopy  2015  H/O spinal fusion  2000  rods cervical and lumbar spine  History of cataract surgery  (Bilateral eyes)  History of tonsillectomy  (Age 12)  S/P carotid endarterectomy  (Right, 2/2018) Yes

## 2023-12-12 ENCOUNTER — APPOINTMENT (OUTPATIENT)
Dept: ORTHOPEDIC SURGERY | Facility: CLINIC | Age: 78
End: 2023-12-12

## 2023-12-14 ENCOUNTER — APPOINTMENT (OUTPATIENT)
Dept: ORTHOPEDIC SURGERY | Facility: CLINIC | Age: 78
End: 2023-12-14
Payer: MEDICARE

## 2023-12-14 ENCOUNTER — RESULT CHARGE (OUTPATIENT)
Age: 78
End: 2023-12-14

## 2023-12-14 VITALS — HEIGHT: 66 IN | WEIGHT: 161 LBS | BODY MASS INDEX: 25.88 KG/M2

## 2023-12-14 DIAGNOSIS — S40.011A CONTUSION OF RIGHT SHOULDER, INITIAL ENCOUNTER: ICD-10-CM

## 2023-12-14 PROCEDURE — 73030 X-RAY EXAM OF SHOULDER: CPT | Mod: RT

## 2023-12-14 PROCEDURE — 99213 OFFICE O/P EST LOW 20 MIN: CPT

## 2023-12-14 NOTE — PHYSICAL EXAM
[Sitting] : sitting [Mild] : mild [4___] : external rotation 4[unfilled]/5 [5___] : internal rotation 5[unfilled]/5 [Right] : right shoulder [There are no fractures, subluxations or dislocations. No significant abnormalities are seen] : There are no fractures, subluxations or dislocations. No significant abnormalities are seen [Degenerative change] : Degenerative change [] : no high-riding clavicle [TWNoteComboBox7] : active forward flexion 120 degrees [TWNoteComboBox4] : passive forward flexion 130 degrees [TWNoteComboBox6] : internal rotation L5 [de-identified] : external rotation 45 degrees
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
## 2023-12-19 ENCOUNTER — APPOINTMENT (OUTPATIENT)
Dept: MRI IMAGING | Facility: CLINIC | Age: 78
End: 2023-12-19
Payer: MEDICARE

## 2023-12-19 PROCEDURE — 73221 MRI JOINT UPR EXTREM W/O DYE: CPT | Mod: RT,MH

## 2023-12-21 ENCOUNTER — APPOINTMENT (OUTPATIENT)
Dept: ORTHOPEDIC SURGERY | Facility: CLINIC | Age: 78
End: 2023-12-21

## 2023-12-22 ENCOUNTER — APPOINTMENT (OUTPATIENT)
Dept: ORTHOPEDIC SURGERY | Facility: CLINIC | Age: 78
End: 2023-12-22

## 2024-01-03 ENCOUNTER — APPOINTMENT (OUTPATIENT)
Dept: ORTHOPEDIC SURGERY | Facility: CLINIC | Age: 79
End: 2024-01-03
Payer: MEDICARE

## 2024-01-03 DIAGNOSIS — M75.41 IMPINGEMENT SYNDROME OF RIGHT SHOULDER: ICD-10-CM

## 2024-01-03 PROCEDURE — J3490M: CUSTOM | Mod: NC

## 2024-01-03 PROCEDURE — 99214 OFFICE O/P EST MOD 30 MIN: CPT | Mod: 25

## 2024-01-03 PROCEDURE — 20611 DRAIN/INJ JOINT/BURSA W/US: CPT | Mod: RT

## 2024-01-03 RX ORDER — MELOXICAM 15 MG/1
15 TABLET ORAL
Qty: 30 | Refills: 0 | Status: ACTIVE | COMMUNITY
Start: 2024-01-03 | End: 1900-01-01

## 2024-01-03 NOTE — PROCEDURE
[FreeTextEntry3] : Large Joint Injection / Aspiration: Celestone, Lidocaine, Marcaine and Guidance Ultrasound Large Joint Injection was performed because of pain and inflammation. Anesthesia: ethyl chloride sprayed topically..  Celestone: An injection of Celestone 12 mg , 2 cc. Needle size: 22 gauge , 1.5 inch.  Lidocaine: 3 cc.  Marcaine: 3 cc.   Medication was injected in the right shoulder. Patient has tried OTC's including aspirin, Ibuprofen, Aleve etc or prescription NSAIDS, and/or exercises at home and/ or physical therapy without satisfactory response. After verbal consent using sterile preparation and technique. The risks, benefits, and alternatives to cortisone injection were explained in full to the patient. Risks outlined include but are not limited to infection, sepsis, bleeding, scarring, skin discoloration, temporary increase in pain, syncopal episode, failure to resolve symptoms, allergic reaction, symptom recurrence, and elevation of blood sugar in diabetics. Patient understood the risks. All questions were answered. After discussion of options, patient requested an injection. Oral informed consent was obtained and sterile prep was done of the injection site. Sterile technique was utilized for the procedure including the preparation of the solutions used for the injection. Patient tolerated the procedure well. Advised to ice the injection site this evening. Prep with betadine locally to site. Sterile technique used. Patient tolerated procedure well. Post Procedure Instructions: Patient was advised to call if redness, pain, or fever occur and apply ice for 15 min. out of every hour for the next 12-24 hours as tolerated. patient was advised to rest the joint(s) for 1 days.   Ultrasound Guidance was used for the following reasons: for Glenohumeral injection.   Ultrasound guided injection was performed of the shoulder, visualization of the needle and placement of injection was performed without complication.

## 2024-01-03 NOTE — HISTORY OF PRESENT ILLNESS
[Gradual] : gradual [Throbbing] : throbbing [Constant] : constant [Sleep] : sleep [Rest] : rest [Meds] : meds [de-identified] : pain in the right shoulder, symptoms after  he slipped on a grape in a store and fell backwards, he has been experiencing pain in the shoulder since. Pain and trouble reaching over head, behind back and sleeping at night. No prior history of injury to the shoulder. Meds help and certain motions make him feel worse [10] : 10 [] : no [FreeTextEntry1] : right shoulder  [FreeTextEntry5] : Patient fell 2 weeks ago and fell back. Patient hit his shoulder and head on tile floor.  Patient went to [facility] following accident.  [FreeTextEntry7] : down to hand  [FreeTextEntry9] : advil  [de-identified] : [facility]  [de-identified] : mris  [de-identified] : mris

## 2024-01-03 NOTE — PHYSICAL EXAM
[Right] : right shoulder [Sitting] : sitting [Mild] : mild [4___] : external rotation 4[unfilled]/5 [5___] : internal rotation 5[unfilled]/5 [] : motor and sensory intact distally [TWNoteComboBox7] : active forward flexion 130 degrees [TWNoteComboBox4] : passive forward flexion 145 degrees [TWNoteComboBox6] : internal rotation L5 [de-identified] : external rotation 45 degrees

## 2024-01-03 NOTE — DATA REVIEWED
[MRI] : MRI [Right] : of the right [Shoulder] : shoulder [Report was reviewed and noted in the chart] : The report was reviewed and noted in the chart [I reviewed the films/CD and agree] : I reviewed the films/CD and agree [FreeTextEntry1] : labral tear, IS, severe partial tear biceps mod RTC

## 2024-01-03 NOTE — DISCUSSION/SUMMARY
[de-identified] : modify activities try OTC meds ice as needed try topical lidocaine try ice  01/03/2024    [facility]  DHRUV DONOHUE   [id number]    Attention:  Nurse Reviewer /Medical Director  I am writing this letter as a medical necessity for PT program. Patient has tried analgesics, non-steroid anti-inflammatory agents,  hot or cold compresses,injections of corticosteroids, etc)  which in combination or by themselves has not worked. Based on my patient's condition, I strongly believe that the PT is medically needed.   Thank you for your time and consideration.     will try csi poss arthrosocpy in future

## 2024-01-04 ENCOUNTER — APPOINTMENT (OUTPATIENT)
Dept: ORTHOPEDIC SURGERY | Facility: CLINIC | Age: 79
End: 2024-01-04

## 2024-01-16 ENCOUNTER — APPOINTMENT (OUTPATIENT)
Dept: ORTHOPEDIC SURGERY | Facility: CLINIC | Age: 79
End: 2024-01-16

## 2024-01-17 ENCOUNTER — APPOINTMENT (OUTPATIENT)
Dept: ORTHOPEDIC SURGERY | Facility: CLINIC | Age: 79
End: 2024-01-17
Payer: MEDICARE

## 2024-01-17 VITALS — BODY MASS INDEX: 25.88 KG/M2 | HEIGHT: 66 IN | WEIGHT: 161 LBS

## 2024-01-17 DIAGNOSIS — M51.36 OTHER INTERVERTEBRAL DISC DEGENERATION, LUMBAR REGION: ICD-10-CM

## 2024-01-17 PROCEDURE — 72070 X-RAY EXAM THORAC SPINE 2VWS: CPT

## 2024-01-17 PROCEDURE — 99214 OFFICE O/P EST MOD 30 MIN: CPT

## 2024-01-17 PROCEDURE — 72100 X-RAY EXAM L-S SPINE 2/3 VWS: CPT

## 2024-01-18 ENCOUNTER — APPOINTMENT (OUTPATIENT)
Dept: ORTHOPEDIC SURGERY | Facility: CLINIC | Age: 79
End: 2024-01-18
Payer: MEDICARE

## 2024-01-18 DIAGNOSIS — M54.50 LOW BACK PAIN, UNSPECIFIED: ICD-10-CM

## 2024-01-18 DIAGNOSIS — R26.89 OTHER ABNORMALITIES OF GAIT AND MOBILITY: ICD-10-CM

## 2024-01-18 DIAGNOSIS — Z96.652 PRESENCE OF LEFT ARTIFICIAL KNEE JOINT: ICD-10-CM

## 2024-01-18 DIAGNOSIS — Z96.651 PRESENCE OF RIGHT ARTIFICIAL KNEE JOINT: ICD-10-CM

## 2024-01-18 PROCEDURE — 72170 X-RAY EXAM OF PELVIS: CPT

## 2024-01-18 PROCEDURE — 72100 X-RAY EXAM L-S SPINE 2/3 VWS: CPT

## 2024-01-18 PROCEDURE — 73562 X-RAY EXAM OF KNEE 3: CPT | Mod: RT

## 2024-01-18 PROCEDURE — 99213 OFFICE O/P EST LOW 20 MIN: CPT

## 2024-01-18 NOTE — HISTORY OF PRESENT ILLNESS
[Gradual] : gradual [5] : 5 [2] : 2 [Dull/Aching] : dull/aching [Localized] : localized [Sharp] : sharp [Intermittent] : intermittent [Rest] : rest [Ice] : ice [Nothing helps with pain getting better] : Nothing helps with pain getting better [Standing] : standing [Walking] : walking [] : yes [de-identified] : 09/08/2022 HERE FOR A FOLLOW UP H/O RIGHT TKA  DONE BY DR ROSE HAVING PAIN ,STIFFNESS ON THE SIDE OF THE KNEE  10/27/22 HERE FOR A FOLLOW UP ON BOTH KNEES S/P TKAS HAVING PAIN ALSO ON BOTH HIPS   01/18/24 pt presents here today with right knee /lower spine pain after [place] fell [place] [employer] [place] on 11[place]  pt was taking by ambulance to   [facility]  [FreeTextEntry1] : right knee/back  [FreeTextEntry7] : down the legs  [FreeTextEntry9] : Tylenol  [de-identified] : motion  [de-identified] : NOTHING

## 2024-01-18 NOTE — PHYSICAL EXAM
[Bilateral] : knee bilaterally [] : no sign of infection [FreeTextEntry8] : MEDIAL AND LATERAL EPICONDYLE TENDERNESS [TWNoteComboBox7] : flexion 120 degrees [de-identified] : extension 0 degrees

## 2024-01-18 NOTE — ASSESSMENT
[FreeTextEntry1] : S/P RT PARTIAL KNEE REPLACEMENT BY DR. ORDONEZ 1/2021 S/P RIGHT TKA REVISION 12/1/21: DOING WELL. HE IS CURRENTLY IN PT. IS HAVING SOME PAIN AND INSTABILITY. NO F/C/S. XRAYS REVIEWED WITH COMPONENTS WELL FIXED, IN GOOD POSITION. GOOD ROM. NO SIGNS OF INFECTION. GOOD LIGAMENT BALANCE. PRECAUTIONS AND ABX DISCUSSED.  S/P LT TKA 2/23/22: NO F/C/S. DOING WELL. XRAYS REVIEWED WITH COMPONENTS WELL FIXED. NO SIGNS OF INFECTION. GOOD LIGAMENT BALANCE ON EXAM. QUESTIONS ANSWERED.  C/O B/L ANTERIOR KNEE PAIN SINCE 11/30/23 AFTER SLIP AND FALL INJURY. ALSO, C/O LUMBAR BACK PAIN. THE PAIN RADIATES. XRAYS REVIEWED WITH COMPONENTS WELL FIXED. HE WILL CONTINUE WITH DR. MCKAY AND PHYSICAL THERAPY. HAS BALANCE ISSUES. CANE USE ADVISED.

## 2024-01-19 ENCOUNTER — APPOINTMENT (OUTPATIENT)
Dept: PAIN MANAGEMENT | Facility: CLINIC | Age: 79
End: 2024-01-19
Payer: MEDICARE

## 2024-01-19 VITALS — BODY MASS INDEX: 25.88 KG/M2 | HEIGHT: 66 IN | WEIGHT: 161 LBS

## 2024-01-19 DIAGNOSIS — M54.16 RADICULOPATHY, LUMBAR REGION: ICD-10-CM

## 2024-01-19 DIAGNOSIS — M54.50 LOW BACK PAIN, UNSPECIFIED: ICD-10-CM

## 2024-01-19 DIAGNOSIS — M47.816 SPONDYLOSIS W/OUT MYELOPATHY OR RADICULOPATHY, LUMBAR REGION: ICD-10-CM

## 2024-01-19 PROCEDURE — 99213 OFFICE O/P EST LOW 20 MIN: CPT | Mod: 25

## 2024-01-19 PROCEDURE — J3490M: CUSTOM | Mod: NC

## 2024-01-19 PROCEDURE — 20552 NJX 1/MLT TRIGGER POINT 1/2: CPT

## 2024-01-19 NOTE — HISTORY OF PRESENT ILLNESS
[Lower back] : lower back [7] : 7 [5] : 5 [Dull/Aching] : dull/aching [Constant] : constant [Household chores] : household chores [Leisure] : leisure [Sleep] : sleep [Ice] : ice [Heat] : heat [Physical therapy] : physical therapy [Standing] : standing [Walking] : walking [] : yes [de-identified] : 1/19/24: Pain is in the right shoulder and seeing Dr. Rose. Has been doing PT with no relief and will likely need surgery.  Also with pain on the b/l lower back and radiates down the b/l legs. Has been doing PT. Had spine surgery with Dr. Mcgregor and the back has been pretty good since the injection.   04/21/23: s/p B/L L3,L4,L5 RFA on 03/29/23 with >80% relief and improvement of ADLs. Pain was great for a week and then started to return. Will be having surgery with Dr. Mcgregor on 5/15/23.   03/03/2023: s/p second diagnostic B/L L3,L4,L5 MBB on 02/22/2023 with >80% relief and improvement of ADLs. Pain has been feeling much better since injection.  01/13/2023: s/p b/l L3,4,5 MBB on 12/28/22 with >80% relief and improvement of ADLs. Pain was great for 5-6 days and then started to return.  11/18/22: Pain is at the b/l posterior knees and radiates up the posterior thighs to the hips and lower back. Saw Dr. Daugherty who ordered new MRI and recommended pain mgt.  MRI L-spine 10/29/22 independently reviewed: L1-2 moderate spinal stenosis  08/19/2022:s/p L2-3 LESI on 7/20/22 with 90% relief and improvement of ADLs. Overall has been feeling much better. Takes tylenol PRN.  06/24/2022 :s/p L2-L3 LESI on 6/8/22 with 50% relief and improvement of ADLs. Says he gets better from injections but when he does PT it aggravates it.  5/13/22: Pain is across the lower back and radiates down the right leg the ankle described as a sharp stabbing pain with associated numbness and tingling. Had knee surgery in Feb with Dr. Daugherty - has been doing PT.  10/22/21: pt reports supposed to have epidural inj last appt but was canceled due to a possible infection of right knee, however Dr. Daugherty said it was fine to have the injection but pt says he was not aware.  9/17/21: Pt scheduled for L3,4,5 MBB on Oct 6th but had some questions regarding the procedure. Pain still across the lower back with no radiation down the legs. Pt is supposed to have knee surgery with Dr. Daugherty but unsure when it will be.  8/27/21: s/p L2-3 LESI on 8/11/21 with 60% relief and improvement of ADLs. Has been feeling much better since the injection. Axial back pain is worse than the legs described as a stiffness.  7/30/21: s/p L2-3 LESI on 7/7/21 with 50% relief and improvement of ADLs. Pain still radiating down the legs but less severe. Pain is the worse across the lower back.  Initial HPI:  Pain is on the b/l lower back and radiates down the b/l lateral thighs and lower legs to the calves described as a sharp pain with associated numbness/tingling and cramping. Takes Tylenol PRN. Saw Dr. Steel who recommended surgery but wants to wait on surgery. s/p L3-S1 laminectomy several years ago. [FreeTextEntry7] : legs

## 2024-01-19 NOTE — PHYSICAL EXAM
[de-identified] : Constitutional; Appears well, no apparent distress Ability to communicate: Normal  Respiratory: non-labored breathing Skin: No rash noted Head: Normocephalic, atraumatic Neck: no visible thyroid enlargement Eyes: Extraocular movements intact Neurologic: Alert and oriented x3 Psychiatric: normal mood, affect and behavior [] : non-antalgic

## 2024-01-19 NOTE — PROCEDURE
[FreeTextEntry3] : Procedure Name: Trigger Point Injection (1-2 muscle groups): Celestone and Marcaine Trigger Point was performed because of pain.  Celestone: An injection of Celestone 6 mg Marcaine: An injection of Marcaine 10 mg Medication was injected in the Bilateral Lumbar Paraspinal muscle.   Patient has tried OTC's including aspirin, Ibuprofen, Aleve etc or prescription NSAIDS, and/or exercises at home and/ or physical therapy without satisfactory response. After verbal consent using sterile preparation and technique.The risks, benefits, and alternatives to cortisone injection were explained in full to the patient. Risks outlined include but are not limited to infection, sepsis, bleeding, scarring, skin discoloration, temporary increase in pain, syncopal episode, failure to resolve symptoms, allergic reaction, symptom recurrence, and elevation of blood sugar in diabetics. Patient understood the risks. All questions were answered. After discussion of options, patient requested an injection. Oral informed consent was obtained and sterile prep was done of the injection site. Sterile technique was utilized for the procedure including the preparation of the solutions used for the injection. Patient tolerated the procedure well. Advised to ice the injection site this evening. Prep with alcohol locally to site. Sterile technique used.

## 2024-01-19 NOTE — DISCUSSION/SUMMARY
[de-identified] : After discussing various treatment options with the patient including but not limited to oral medications, physical therapy, exercise modalities as well as interventional spinal injections, we have decided with the following plan:  - Continue home exercises, stretching, activity modification, physical therapy, and conservative care. - Follow-up as needed.

## 2024-01-30 ENCOUNTER — APPOINTMENT (OUTPATIENT)
Dept: ORTHOPEDIC SURGERY | Facility: CLINIC | Age: 79
End: 2024-01-30
Payer: MEDICARE

## 2024-01-30 VITALS — BODY MASS INDEX: 25.88 KG/M2 | HEIGHT: 66 IN | WEIGHT: 161 LBS

## 2024-01-30 PROCEDURE — 99214 OFFICE O/P EST MOD 30 MIN: CPT

## 2024-01-30 RX ORDER — CELECOXIB 200 MG/1
200 CAPSULE ORAL
Qty: 30 | Refills: 0 | Status: ACTIVE | COMMUNITY
Start: 2024-01-30 | End: 1900-01-01

## 2024-01-30 NOTE — PHYSICAL EXAM
[Right] : right shoulder [Sitting] : sitting [Mild] : mild [4___] : external rotation 4[unfilled]/5 [5___] : internal rotation 5[unfilled]/5 [] : motor and sensory intact distally [TWNoteComboBox7] : active forward flexion 130 degrees [TWNoteComboBox4] : passive forward flexion 145 degrees [TWNoteComboBox6] : internal rotation 30 degrees [de-identified] : external rotation 45 degrees

## 2024-01-30 NOTE — HISTORY OF PRESENT ILLNESS
[Gradual] : gradual [Dull/Aching] : dull/aching [Throbbing] : throbbing [Constant] : constant [Rest] : rest [Meds] : meds [Physical therapy] : physical therapy [de-identified] : pain in the right shoulder, symptoms after  he slipped on a grape in a store and fell backwards, he has been experiencing pain in the shoulder since, a little better after PT and csi with temp help. Pain and trouble reaching over head, behind back and sleeping at night. Has known mod RTC strain, labral tear and partial biceps tendon tear [10] : 10 [Sleep] : sleep [] : yes [FreeTextEntry1] : right shoulder  [FreeTextEntry5] : Patient fell 2 weeks ago and fell back. Patient hit his shoulder and head on tile floor.  Patient went to [facility] following accident.  [FreeTextEntry7] : down to hand  [FreeTextEntry9] : advil  [de-identified] : reaching or lifting  [de-identified] : [facility]  [de-identified] : mris  [de-identified] : mris

## 2024-01-30 NOTE — DISCUSSION/SUMMARY
[de-identified] : Patient allowed to gently start resuming activities. Discussed change to medication prescription and usage. Bracing options discussed with patient. Activity modification as needed Discussed poss future surgery, pt deciding, poss AS and debridment try topical lidocaine reviewed current medications used by this patient 01/30/2024    RE[facility]  DHRUV DONOHUE   [id number]    Attention:  Nurse Reviewer /Medical Director  I am writing this letter as a medical necessity for PT program. Patient has tried analgesics, non-steroid anti-inflammatory agents,  hot or cold compresses,injections of corticosteroids, etc)  which in combination or by themselves has not worked. Based on my patient's condition, I strongly believe that the PT is medically needed.   Thank you for your time and consideration.

## 2024-02-02 NOTE — H&P PST ADULT - GENERAL GENITOURINARY SYMPTOMS
Spoke with patient and he said he had an endoscopy procedure done today and is bleeding internally somewhere. He stated that he stayed off of Xarelto for 3 days for the procedure, but wants to know if he needs to resume taking it or discontinue it now. Informed him that I would send a message to Dr. Gallardo's staff for a response since he prescribes the medication. Patient verbalized understanding.   hematuria/increased urinary frequency

## 2024-02-06 ENCOUNTER — APPOINTMENT (OUTPATIENT)
Dept: ORTHOPEDIC SURGERY | Facility: CLINIC | Age: 79
End: 2024-02-06
Payer: MEDICARE

## 2024-02-06 VITALS — BODY MASS INDEX: 25.88 KG/M2 | WEIGHT: 161 LBS | HEIGHT: 66 IN

## 2024-02-06 DIAGNOSIS — S63.91XA SPRAIN OF UNSPECIFIED PART OF RIGHT WRIST AND HAND, INITIAL ENCOUNTER: ICD-10-CM

## 2024-02-06 DIAGNOSIS — S63.501A UNSPECIFIED SPRAIN OF RIGHT WRIST, INITIAL ENCOUNTER: ICD-10-CM

## 2024-02-06 PROCEDURE — 99213 OFFICE O/P EST LOW 20 MIN: CPT

## 2024-02-06 PROCEDURE — 73130 X-RAY EXAM OF HAND: CPT | Mod: RT

## 2024-02-06 NOTE — PHYSICAL EXAM
[de-identified] : R hand and wrist Tender HH Tender SF and RF A1 pulleys Stiffness RF and SF no trigger  Xrays OA, no obv fractures

## 2024-02-06 NOTE — ASSESSMENT
[FreeTextEntry1] : I rec MRI wrist to eval HH fracture I diuscssed injections for RF and SF A1 pulleys COnt with brace Return after MRI

## 2024-02-06 NOTE — HISTORY OF PRESENT ILLNESS
[de-identified] : R hand pain  for about 3 months He fell in Nov  Ulnar sided hand pain Stiffness SF and RF Spasms in the hand   [7] : 7 [6] : 6 [Dull/Aching] : dull/aching [Ice] : ice [] : yes [FreeTextEntry1] : R hand [FreeTextEntry5] : referred by Dr. Rose he slipped and fell in nov. went to er-xr,brace has n/t [de-identified] : ctr R

## 2024-02-07 ENCOUNTER — APPOINTMENT (OUTPATIENT)
Dept: MRI IMAGING | Facility: CLINIC | Age: 79
End: 2024-02-07
Payer: MEDICARE

## 2024-02-07 PROCEDURE — 73221 MRI JOINT UPR EXTREM W/O DYE: CPT | Mod: RT,MH

## 2024-02-13 ENCOUNTER — APPOINTMENT (OUTPATIENT)
Dept: ORTHOPEDIC SURGERY | Facility: CLINIC | Age: 79
End: 2024-02-13

## 2024-02-14 NOTE — H&P PST ADULT - PRIMARY CARE PROVIDER
Laureen Meehan [place],  [phone] (medical evaluation done 5/1 as per patient) Patient/Caregiver provided printed discharge information.

## 2024-02-27 ENCOUNTER — APPOINTMENT (OUTPATIENT)
Dept: ORTHOPEDIC SURGERY | Facility: CLINIC | Age: 79
End: 2024-02-27
Payer: MEDICARE

## 2024-02-27 VITALS — HEIGHT: 66 IN | BODY MASS INDEX: 25.88 KG/M2 | WEIGHT: 161 LBS

## 2024-02-27 DIAGNOSIS — S63.591A OTHER SPECIFIED SPRAIN OF RIGHT WRIST, INITIAL ENCOUNTER: ICD-10-CM

## 2024-02-27 PROCEDURE — 20605 DRAIN/INJ JOINT/BURSA W/O US: CPT | Mod: RT

## 2024-02-27 PROCEDURE — 99213 OFFICE O/P EST LOW 20 MIN: CPT | Mod: 25

## 2024-02-27 PROCEDURE — J3490M: CUSTOM | Mod: NC

## 2024-02-27 NOTE — ASSESSMENT
[FreeTextEntry1] : Therapy  R Wrist injection was performed because of pain inflammation and stiffness Anesthesia: ethyl chloride sprayed topically Celestone 6mg: An injection of Celestone 1cc Lidocaine: An injection of Lidocaine 1% 1cc Marcaine: An injection of Marcaine 0.5% 1cc  Patient has tried OTC's including aspirin, Ibuprofen, Aleve etc or prescription NSAIDS, and/or exercises at home and/ or physical therapy without satisfactory response. After verbal consent using sterile preparation and technique. The risks, benefits, and alternatives to cortisone injection were explained in full to the patient. Risks outlined include but are not limited to infection, sepsis, bleeding, scarring, skin discoloration, temporary increase in pain, syncopal episode, failure to resolve symptoms, allergic reaction, symptom recurrence, and elevation of blood sugar in diabetics. Patient understood the risks. All questions were answered. After discussion of options, patient requested an injection. Oral informed consent was obtained and sterile prep was done of the injection site. Sterile technique was utilized for the procedure including the preparation of the solutions used for the injection. Patient tolerated the procedure well. Advised to ice the injection site this evening. Prep with betadine locally to site. Sterile technique used

## 2024-02-27 NOTE — PHYSICAL EXAM
[de-identified] : R wrist: Swelling Tender TFCC Pain with pronation/supination Decreased wrist ROM +TFCC grind Tender thumb CMC

## 2024-02-27 NOTE — HISTORY OF PRESENT ILLNESS
[7] : 7 [6] : 6 [Dull/Aching] : dull/aching [de-identified] : R wrist MRI shows TFCC tear; CMC OA   [FreeTextEntry1] : R wrist  [de-identified] : brace

## 2024-02-28 ENCOUNTER — APPOINTMENT (OUTPATIENT)
Dept: ORTHOPEDIC SURGERY | Facility: CLINIC | Age: 79
End: 2024-02-28
Payer: MEDICARE

## 2024-02-28 DIAGNOSIS — S43.401A UNSPECIFIED SPRAIN OF RIGHT SHOULDER JOINT, INITIAL ENCOUNTER: ICD-10-CM

## 2024-02-28 PROCEDURE — 99213 OFFICE O/P EST LOW 20 MIN: CPT

## 2024-02-28 NOTE — PHYSICAL EXAM
[Right] : right shoulder [Sitting] : sitting [Mild] : mild [4___] : external rotation 4[unfilled]/5 [5___] : internal rotation 5[unfilled]/5 [] : motor and sensory intact distally [TWNoteComboBox7] : active forward flexion 130 degrees [TWNoteComboBox4] : passive forward flexion 145 degrees [TWNoteComboBox6] : internal rotation 30 degrees [de-identified] : external rotation 45 degrees

## 2024-02-28 NOTE — HISTORY OF PRESENT ILLNESS
[Gradual] : gradual [10] : 10 [Dull/Aching] : dull/aching [Throbbing] : throbbing [Constant] : constant [Sleep] : sleep [Rest] : rest [Meds] : meds [Physical therapy] : physical therapy [de-identified] : pain in the right shoulder after he slipped on a grape in a store and fell backwards, he has been experiencing pain in the shoulder since, a little better after PT, temp help, and csi with temp help. Pain and trouble reaching over head, behind back and sleeping at night. Has known mod RTC strain, labral tear and partial biceps tendon tear, has limitations with motion [] : no [FreeTextEntry5] : Patient fell 2 weeks ago and fell back. Patient hit his shoulder and head on tile floor.  Patient went to [facility] following accident.  [FreeTextEntry1] : right shoulder  [FreeTextEntry9] : advil  [FreeTextEntry7] : down to hand  [de-identified] : reaching or lifting  [de-identified] : mris  [de-identified] : [facility]  [de-identified] : mris

## 2024-02-28 NOTE — DISCUSSION/SUMMARY
[de-identified] : Patient allowed to gently start resuming activities. Discussed change to medication prescription and usage.  Activity modification as needed Discussed poss future surgery, pt deciding, poss AS and debridment but not sure if this will be helpful; try topical lidocaine reviewed current medications used by this patient letter of med necessity for arthrosocpy R shoulder labral debridement sad and poss RTC debridement, surg discussion quesitons answered, no guarantees

## 2024-03-08 ENCOUNTER — APPOINTMENT (OUTPATIENT)
Dept: ORTHOPEDIC SURGERY | Facility: CLINIC | Age: 79
End: 2024-03-08
Payer: MEDICARE

## 2024-03-08 VITALS — BODY MASS INDEX: 25.88 KG/M2 | HEIGHT: 66 IN | WEIGHT: 161 LBS

## 2024-03-08 DIAGNOSIS — M79.18 MYALGIA, OTHER SITE: ICD-10-CM

## 2024-03-08 PROCEDURE — 99214 OFFICE O/P EST MOD 30 MIN: CPT

## 2024-03-08 PROCEDURE — 72100 X-RAY EXAM L-S SPINE 2/3 VWS: CPT

## 2024-03-26 ENCOUNTER — APPOINTMENT (OUTPATIENT)
Dept: ORTHOPEDIC SURGERY | Facility: CLINIC | Age: 79
End: 2024-03-26

## 2024-03-26 ENCOUNTER — APPOINTMENT (OUTPATIENT)
Age: 79
End: 2024-03-26
Payer: MEDICARE

## 2024-03-26 PROCEDURE — 29824 SHO ARTHRS SRG DSTL CLAVICLC: CPT | Mod: 59,RT

## 2024-03-26 PROCEDURE — 29826 SHO ARTHRS SRG DECOMPRESSION: CPT | Mod: RT

## 2024-03-26 PROCEDURE — 29826 SHO ARTHRS SRG DECOMPRESSION: CPT | Mod: AS,RT

## 2024-03-26 PROCEDURE — 29824 SHO ARTHRS SRG DSTL CLAVICLC: CPT | Mod: AS,59,RT

## 2024-03-26 PROCEDURE — 29823 SHO ARTHRS SRG XTNSV DBRDMT: CPT | Mod: RT

## 2024-03-26 PROCEDURE — 29823 SHO ARTHRS SRG XTNSV DBRDMT: CPT | Mod: AS,RT

## 2024-03-26 RX ORDER — ASPIRIN 325 MG/1
325 TABLET, FILM COATED ORAL DAILY
Qty: 14 | Refills: 0 | Status: ACTIVE | COMMUNITY
Start: 2024-03-26 | End: 1900-01-01

## 2024-03-26 RX ORDER — HYDROCODONE BITARTRATE AND ACETAMINOPHEN 5; 325 MG/1; MG/1
5-325 TABLET ORAL
Qty: 30 | Refills: 0 | Status: ACTIVE | COMMUNITY
Start: 2024-03-26 | End: 1900-01-01

## 2024-04-03 ENCOUNTER — APPOINTMENT (OUTPATIENT)
Dept: ORTHOPEDIC SURGERY | Facility: CLINIC | Age: 79
End: 2024-04-03
Payer: MEDICARE

## 2024-04-03 DIAGNOSIS — S46.211D STRAIN OF MUSCLE, FASCIA AND TENDON OF OTHER PARTS OF BICEPS, RIGHT ARM, SUBSEQUENT ENCOUNTER: ICD-10-CM

## 2024-04-03 PROCEDURE — 99024 POSTOP FOLLOW-UP VISIT: CPT

## 2024-04-03 NOTE — DISCUSSION/SUMMARY
[de-identified] : Surgical findings reviewed with the patient. HEP to perform reviewed. Activities and restrictions discussed. start PT Program  04/03/2024    RE:  DHRUV DONOHUE   [facility] #- [id number]    Attention:  Nurse Reviewer /Medical Director  I am writing this letter as a medical necessity for PT program. Patient has tried analgesics, non-steroid anti-inflammatory agents,  hot or cold compresses,injections of corticosteroids, etc)  which in combination or by themselves has not worked. Based on my patient's condition, I strongly believe that the PT is medically needed.   Thank you for your time and consideration.

## 2024-04-03 NOTE — PHYSICAL EXAM
[Right] : right shoulder [Sitting] : sitting [] : no ecchymosis [TWNoteComboBox7] : active forward flexion 90 degrees

## 2024-04-08 NOTE — OCCUPATIONAL THERAPY INITIAL EVALUATION ADULT - LEVEL OF INDEPENDENCE: BED TO CHAIR, REHAB EVAL
[Follow-Up Visit] : a follow-up pain management visit [FreeTextEntry2] : MEDICATION REFILL  contact guard

## 2024-04-18 ENCOUNTER — APPOINTMENT (OUTPATIENT)
Dept: ORTHOPEDIC SURGERY | Facility: CLINIC | Age: 79
End: 2024-04-18

## 2024-05-01 ENCOUNTER — APPOINTMENT (OUTPATIENT)
Dept: ORTHOPEDIC SURGERY | Facility: CLINIC | Age: 79
End: 2024-05-01
Payer: MEDICARE

## 2024-05-01 DIAGNOSIS — S43.431D SUPERIOR GLENOID LABRUM LESION OF RIGHT SHOULDER, SUBSEQUENT ENCOUNTER: ICD-10-CM

## 2024-05-01 PROCEDURE — 99024 POSTOP FOLLOW-UP VISIT: CPT

## 2024-05-01 NOTE — PHYSICAL EXAM
[Right] : right shoulder [Sitting] : sitting [Mild] : mild [5___] : internal rotation 5[unfilled]/5 [] : motor and sensory intact distally [FreeTextEntry8] : mild [TWNoteComboBox7] : active forward flexion 155 degrees [TWNoteComboBox4] : passive forward flexion 170 degrees [TWNoteComboBox6] : internal rotation 30 degrees [de-identified] : external rotation 45 degrees

## 2024-05-01 NOTE — DISCUSSION/SUMMARY
[de-identified] : modify activities use elastic sleeve try OTC meds ice as needed try topical lidocaine reviewed current medications used by this patient 05/01/2024    RE:  DHRUV DONOHUE   [facility] #- [id number]    Attention:  Nurse Reviewer /Medical Director  I am writing this letter as a medical necessity for PT program. Patient has tried analgesics, non-steroid anti-inflammatory agents,  hot or cold compresses,injections of corticosteroids, etc)  which in combination or by themselves has not worked. Based on my patient's condition, I strongly believe that the PT is medically needed.   Thank you for your time and consideration.

## 2024-05-01 NOTE — HISTORY OF PRESENT ILLNESS
[Gradual] : gradual [Stabbing] : stabbing [Rest] : rest [Exercising] : exercising [de-identified] : pain in the right shoulder after he slipped on a grape in a store and fell backwards, he is 5 weeks after labral debride, ssad, dce and biceps tendon debride, improving with PT [10] : 10 [Dull/Aching] : dull/aching [Throbbing] : throbbing [Constant] : constant [Sleep] : sleep [Meds] : meds [Physical therapy] : physical therapy [] : yes [FreeTextEntry1] : right shoulder  [FreeTextEntry5] : Patient fell 2 weeks ago and fell back. Patient hit his shoulder and head on tile floor.  Patient went to [facility] following accident.  [FreeTextEntry7] : down to hand  [FreeTextEntry9] : advil  [de-identified] : reaching or lifting  [de-identified] : [facility]  [de-identified] : mris  [de-identified] : mris

## 2024-05-30 ENCOUNTER — APPOINTMENT (OUTPATIENT)
Dept: ORTHOPEDIC SURGERY | Facility: CLINIC | Age: 79
End: 2024-05-30

## 2024-05-30 VITALS — WEIGHT: 164 LBS | HEIGHT: 66 IN | BODY MASS INDEX: 26.36 KG/M2

## 2024-05-30 DIAGNOSIS — M54.12 RADICULOPATHY, CERVICAL REGION: ICD-10-CM

## 2024-05-30 DIAGNOSIS — Z98.1 ARTHRODESIS STATUS: ICD-10-CM

## 2024-05-30 PROCEDURE — 99213 OFFICE O/P EST LOW 20 MIN: CPT | Mod: 24

## 2024-05-30 NOTE — DATA REVIEWED
[MRI] : MRI [Cervical Spine] : cervical spine [Report was reviewed and noted in the chart] : The report was reviewed and noted in the chart [I independently reviewed and interpreted images and report] : I independently reviewed and interpreted images and report [FreeTextEntry1] : @[facility] Spine MRI 04- 1. C3-C7 discectomy and fusion 2. C2-3 moderate to severe bilateral neural foraminal stenosis

## 2024-05-30 NOTE — HISTORY OF PRESENT ILLNESS
[Neck] : neck [8] : 8 [Dull/Aching] : dull/aching [Radiating] : radiating [Constant] : constant [Nothing helps with pain getting better] : Nothing helps with pain getting better [de-identified] : 5/30/24: 78 yo M presenting with neck paint that started months ago. Had right shoulder injury after slipping and falling on a grape in [employer] 11/2023. Has been attending PT for post shoulder sx; referred by PT. Saw PCP; had MRI C Spine completed @ [facility]. Hx of  [] : no [FreeTextEntry7] : b/l arms [de-identified] : MRI C SPINE @ [facility] 04/2024

## 2024-05-30 NOTE — ASSESSMENT
[FreeTextEntry1] : 80 y/o M with chronic right sided neck pain after sustaining a fall on 11/2023, Hx of C3-7 fusion 20 years ago. MRI shows fusion intact, diffuse facet OA, mostly noted at C2-3 w/ moderate to severe bilateral neural foraminal stenosis. No red flags on exam. Had R shoulder sx which contributes to his pain into R trap.  - Referred to pain mgmt and recommend a C2-3 MBB. for what could be referred pain,  BUT need to consider right C3 radiculopathy so DEE DEE would normally be indicated but I'm not sure how effective one would be since the presumably symptomatic site is at C23 and very cephalad and the ARNALDO may not reach that level - F/up as symptoms dictate.

## 2024-05-30 NOTE — IMAGING
[de-identified] :  C Spine Inspection: No defects or deformities Palpation: No tenderness or spasms in trapezial, rhomboid or paracervical musculature ROM: diminished in all planes Strength: 5/5 b/l deltoid, biceps, triceps, wrist flexors, wrist extensors, abductors Neuro: Sensation I LT Negative Blanco's b/l Negative Spurling

## 2024-05-31 ENCOUNTER — APPOINTMENT (OUTPATIENT)
Dept: PAIN MANAGEMENT | Facility: CLINIC | Age: 79
End: 2024-05-31
Payer: MEDICARE

## 2024-05-31 VITALS — WEIGHT: 164 LBS | HEIGHT: 66 IN | BODY MASS INDEX: 26.36 KG/M2

## 2024-05-31 DIAGNOSIS — M54.50 LOW BACK PAIN, UNSPECIFIED: ICD-10-CM

## 2024-05-31 DIAGNOSIS — M54.2 CERVICALGIA: ICD-10-CM

## 2024-05-31 DIAGNOSIS — M47.812 SPONDYLOSIS W/OUT MYELOPATHY OR RADICULOPATHY, CERVICAL REGION: ICD-10-CM

## 2024-05-31 DIAGNOSIS — M79.10 MYALGIA, UNSPECIFIED SITE: ICD-10-CM

## 2024-05-31 PROCEDURE — J3490M: CUSTOM | Mod: NC

## 2024-05-31 PROCEDURE — 20552 NJX 1/MLT TRIGGER POINT 1/2: CPT

## 2024-05-31 PROCEDURE — 99214 OFFICE O/P EST MOD 30 MIN: CPT | Mod: 25

## 2024-05-31 NOTE — DISCUSSION/SUMMARY
[de-identified] : After discussing various treatment options with the patient including but not limited to oral medications, physical therapy, exercise modalities as well as interventional spinal injections, we have decided with the following plan:  - Continue Home exercises, stretching, activity modification, physical therapy, and conservative care. - MRI report and/or images was reviewed and discussed with the patient. - Recommend First and Second Diagnostic RIGHT C2,3,4 Medial Branch Blocks under fluoroscopic guidance with image. - Patient presents with axial lumbar pain that has not responded to 3 months of conservative therapy including physical therapy or NSAID therapy.  The pain is interfering with activities of daily living and functionality. There is no radicular pain. The pain is exacerbated by facet loading. The patient has not had a vertebral fusion at the levels of the proposed treatment.  There is no unexplained neurologic deficit.  There is no history of systemic infection, unstable medical condition, bleeding tendency, or local infection.  The injection is being performed to diagnose the facet joint as the source of the individual's pain, in preparation for a radiofrequency ablation.  - The risks, benefits, contents and alternatives to injection were explained in full to the patient.  Risks outlined include but are not limited to infection, sepsis, bleeding, post-dural puncture headache, nerve damage, temporary increase in pain, syncopal episode, failure to resolve symptoms, allergic reaction, symptom recurrence, and elevation of blood sugar in diabetics. Cortisone may cause immunosuppression.  Patient understands the risks.  All questions were answered.  After discussion of options, patient requested an injection.  Information regarding the injection was given to the patient.  Which medications to stop prior to the injection was explained to the patient as well. - Follow up in 1-2 weeks post injection for re-evaluation.

## 2024-05-31 NOTE — PHYSICAL EXAM
[de-identified] : Constitutional; Appears well, no apparent distress Ability to communicate: Normal  Respiratory: non-labored breathing Skin: No rash noted Head: Normocephalic, atraumatic Neck: no visible thyroid enlargement Eyes: Extraocular movements intact Neurologic: Alert and oriented x3 Psychiatric: normal mood, affect and behavior [] : non-antalgic

## 2024-05-31 NOTE — HISTORY OF PRESENT ILLNESS
[Lower back] : lower back [7] : 7 [5] : 5 [Dull/Aching] : dull/aching [Constant] : constant [Household chores] : household chores [Leisure] : leisure [Sleep] : sleep [Ice] : ice [Heat] : heat [Physical therapy] : physical therapy [Standing] : standing [Walking] : walking [] : yes [de-identified] : 05/31/2024:   1/19/24: Pain is in the right shoulder and seeing Dr. Rose. Has been doing PT with no relief and will likely need surgery.  Also with pain on the b/l lower back and radiates down the b/l legs. Has been doing PT. Had spine surgery with Dr. Mcgregor and the back has been pretty good since the injection.   04/21/23: s/p B/L L3,L4,L5 RFA on 03/29/23 with >80% relief and improvement of ADLs. Pain was great for a week and then started to return. Will be having surgery with Dr. Mcgregor on 5/15/23.   03/03/2023: s/p second diagnostic B/L L3,L4,L5 MBB on 02/22/2023 with >80% relief and improvement of ADLs. Pain has been feeling much better since injection.  01/13/2023: s/p b/l L3,4,5 MBB on 12/28/22 with >80% relief and improvement of ADLs. Pain was great for 5-6 days and then started to return.  11/18/22: Pain is at the b/l posterior knees and radiates up the posterior thighs to the hips and lower back. Saw Dr. Daugherty who ordered new MRI and recommended pain mgt.  MRI L-spine 10/29/22 independently reviewed: L1-2 moderate spinal stenosis  08/19/2022:s/p L2-3 LESI on 7/20/22 with 90% relief and improvement of ADLs. Overall has been feeling much better. Takes tylenol PRN.  06/24/2022 :s/p L2-L3 LESI on 6/8/22 with 50% relief and improvement of ADLs. Says he gets better from injections but when he does PT it aggravates it.  5/13/22: Pain is across the lower back and radiates down the right leg the ankle described as a sharp stabbing pain with associated numbness and tingling. Had knee surgery in Feb with Dr. Daugherty - has been doing PT.  10/22/21: pt reports supposed to have epidural inj last appt but was canceled due to a possible infection of right knee, however Dr. Flushing said it was fine to have the injection but pt says he was not aware.  9/17/21: Pt scheduled for L3,4,5 MBB on Oct 6th but had some questions regarding the procedure. Pain still across the lower back with no radiation down the legs. Pt is supposed to have knee surgery with Dr. Daugherty but unsure when it will be.  8/27/21: s/p L2-3 LESI on 8/11/21 with 60% relief and improvement of ADLs. Has been feeling much better since the injection. Axial back pain is worse than the legs described as a stiffness.  7/30/21: s/p L2-3 LESI on 7/7/21 with 50% relief and improvement of ADLs. Pain still radiating down the legs but less severe. Pain is the worse across the lower back.  Initial HPI:  Pain is on the b/l lower back and radiates down the b/l lateral thighs and lower legs to the calves described as a sharp pain with associated numbness/tingling and cramping. Takes Tylenol PRN. Saw Dr. Steel who recommended surgery but wants to wait on surgery. s/p L3-S1 laminectomy several years ago. [Neck] : neck [FreeTextEntry1] : 05/31/2024: Pain is on the right side of the neck and radiates towards the right shoulder. Saw Dr. Palacios who recommended C2-3 MBB but can also be right C3 radicular pain so can consider DEE DEE but may not reach that level. Also with b/l lower back pain and wants TPI.   MRI C-spine independently reviewed: diffuse facet OA, worse at C2-3 with moderate/severe B/L NF stenosis.   1/19/24: Pain is in the right shoulder and seeing Dr. Rose. Has been doing PT with no relief and will likely need surgery.  Also with pain on the b/l lower back and radiates down the b/l legs. Has been doing PT. Had spine surgery with Dr. Mcgregor and the back has been pretty good since the injection.   04/21/23: s/p B/L L3,L4,L5 RFA on 03/29/23 with >80% relief and improvement of ADLs. Pain was great for a week and then started to return. Will be having surgery with Dr. Mcgregor on 5/15/23.   03/03/2023: s/p second diagnostic B/L L3,L4,L5 MBB on 02/22/2023 with >80% relief and improvement of ADLs. Pain has been feeling much better since injection.  01/13/2023: s/p b/l L3,4,5 MBB on 12/28/22 with >80% relief and improvement of ADLs. Pain was great for 5-6 days and then started to return.  11/18/22: Pain is at the b/l posterior knees and radiates up the posterior thighs to the hips and lower back. Saw Dr. Daugherty who ordered new MRI and recommended pain mgt.  MRI L-spine 10/29/22 independently reviewed: L1-2 moderate spinal stenosis  08/19/2022:s/p L2-3 LESI on 7/20/22 with 90% relief and improvement of ADLs. Overall has been feeling much better. Takes tylenol PRN.  06/24/2022 :s/p L2-L3 LESI on 6/8/22 with 50% relief and improvement of ADLs. Says he gets better from injections but when he does PT it aggravates it.  5/13/22: Pain is across the lower back and radiates down the right leg the ankle described as a sharp stabbing pain with associated numbness and tingling. Had knee surgery in Feb with Dr. Daugherty - has been doing PT.  10/22/21: pt reports supposed to have epidural inj last appt but was canceled due to a possible infection of right knee, however Dr. Daugherty said it was fine to have the injection but pt says he was not aware.  9/17/21: Pt scheduled for L3,4,5 MBB on Oct 6th but had some questions regarding the procedure. Pain still across the lower back with no radiation down the legs. Pt is supposed to have knee surgery with Dr. Daugherty but unsure when it will be.  8/27/21: s/p L2-3 LESI on 8/11/21 with 60% relief and improvement of ADLs. Has been feeling much better since the injection. Axial back pain is worse than the legs described as a stiffness.  7/30/21: s/p L2-3 LESI on 7/7/21 with 50% relief and improvement of ADLs. Pain still radiating down the legs but less severe. Pain is the worse across the lower back.  Initial HPI:  Pain is on the b/l lower back and radiates down the b/l lateral thighs and lower legs to the calves described as a sharp pain with associated numbness/tingling and cramping. Takes Tylenol PRN. Saw Dr. Steel who recommended surgery but wants to wait on surgery. s/p L3-S1 laminectomy several years ago. [FreeTextEntry7] : legs, b/l shoulder

## 2024-05-31 NOTE — REASON FOR VISIT
[Follow-Up Visit] : a follow-up pain management visit [FreeTextEntry2] : back pain, pt fell on his back

## 2024-06-05 ENCOUNTER — APPOINTMENT (OUTPATIENT)
Dept: ORTHOPEDIC SURGERY | Facility: CLINIC | Age: 79
End: 2024-06-05
Payer: MEDICARE

## 2024-06-05 DIAGNOSIS — M19.011 PRIMARY OSTEOARTHRITIS, RIGHT SHOULDER: ICD-10-CM

## 2024-06-05 PROCEDURE — 99024 POSTOP FOLLOW-UP VISIT: CPT

## 2024-06-05 NOTE — DISCUSSION/SUMMARY
[de-identified] : modify activities try OTC meds ice as needed try topical lidocaine reviewed current medications used by this patient 06/5/2024    RE:  DHRUV DONOHUE   [facility] #- [id number]    Attention:  Nurse Reviewer /Medical Director  I am writing this letter as a medical necessity for PT program. Patient has tried analgesics, non-steroid anti-inflammatory agents,  hot or cold compresses,injections of corticosteroids, etc)  which in combination or by themselves has not worked. Based on my patient's condition, I strongly believe that the PT is medically needed.   Thank you for your time and consideration.

## 2024-06-05 NOTE — HISTORY OF PRESENT ILLNESS
[Gradual] : gradual [Dull/Aching] : dull/aching [Rest] : rest [Meds] : meds [Physical therapy] : physical therapy [de-identified] : pain in the right shoulder better with PT , 3/26 labral debride, sad, dce and biceps tendon debride, improving with PT [10] : 10 [Stabbing] : stabbing [Throbbing] : throbbing [Constant] : constant [Sleep] : sleep [Exercising] : exercising [] : yes [FreeTextEntry1] : right shoulder  [FreeTextEntry5] : Patient fell 2 weeks ago and fell back. Patient hit his shoulder and head on tile floor.  Patient went to [facility] following accident.  [FreeTextEntry7] : down to hand  [FreeTextEntry9] : advil  [de-identified] : reaching or lifting  [de-identified] : [facility]  [de-identified] : mris  [de-identified] : mris  [de-identified] : 03/26/24

## 2024-06-05 NOTE — PHYSICAL EXAM
[Right] : right shoulder [Sitting] : sitting [Mild] : mild [5___] : internal rotation 5[unfilled]/5 [] : motor and sensory intact distally [FreeTextEntry8] : mild [TWNoteComboBox7] : active forward flexion 180 degrees [TWNoteComboBox4] : passive forward flexion 180 degrees [TWNoteComboBox6] : internal rotation 40 degrees [de-identified] : external rotation 45 degrees

## 2024-07-17 ENCOUNTER — APPOINTMENT (OUTPATIENT)
Dept: ORTHOPEDIC SURGERY | Facility: CLINIC | Age: 79
End: 2024-07-17

## 2024-07-26 NOTE — H&P PST ADULT - ENMT
Patch Test Removal Text: The patch test material was removed from the back today. The patch test reading will be performed at a later date. Detail Level: None negative not examined

## 2024-09-14 NOTE — H&P PST ADULT - NEGATIVE GENERAL GENITOURINARY SYMPTOMS
Report given to Diana HARGROVE, care turned over.   no hematuria/no flank pain L/no flank pain R/no bladder infections/no incontinence/no dysuria

## 2024-10-14 ENCOUNTER — APPOINTMENT (OUTPATIENT)
Dept: PAIN MANAGEMENT | Facility: CLINIC | Age: 79
End: 2024-10-14
Payer: MEDICARE

## 2024-10-14 VITALS — HEIGHT: 66 IN | BODY MASS INDEX: 26.84 KG/M2 | WEIGHT: 167 LBS

## 2024-10-14 DIAGNOSIS — M47.812 SPONDYLOSIS W/OUT MYELOPATHY OR RADICULOPATHY, CERVICAL REGION: ICD-10-CM

## 2024-10-14 DIAGNOSIS — M54.2 CERVICALGIA: ICD-10-CM

## 2024-10-14 PROCEDURE — 99214 OFFICE O/P EST MOD 30 MIN: CPT

## 2024-11-13 ENCOUNTER — APPOINTMENT (OUTPATIENT)
Age: 79
End: 2024-11-13
Payer: MEDICARE

## 2024-11-13 PROCEDURE — 64491 INJ PARAVERT F JNT C/T 2 LEV: CPT | Mod: 59,RT

## 2024-11-13 PROCEDURE — 64490 INJ PARAVERT F JNT C/T 1 LEV: CPT | Mod: RT

## 2024-11-14 NOTE — ASU PREOP CHECKLIST - IV STARTED
[facility] provides services at a reduced cost to those who are determined to be eligible through [facility]’s financial assistance program. Information regarding [facility]’s financial assistance program can be found by going to [email or web address] or by calling [phone].
yes

## 2024-11-29 ENCOUNTER — APPOINTMENT (OUTPATIENT)
Dept: PAIN MANAGEMENT | Facility: CLINIC | Age: 79
End: 2024-11-29

## 2024-12-13 ENCOUNTER — APPOINTMENT (OUTPATIENT)
Dept: PAIN MANAGEMENT | Facility: CLINIC | Age: 79
End: 2024-12-13
Payer: MEDICARE

## 2024-12-13 VITALS — HEIGHT: 66 IN | WEIGHT: 169 LBS | BODY MASS INDEX: 27.16 KG/M2

## 2024-12-13 DIAGNOSIS — M47.812 SPONDYLOSIS W/OUT MYELOPATHY OR RADICULOPATHY, CERVICAL REGION: ICD-10-CM

## 2024-12-13 DIAGNOSIS — M16.11 UNILATERAL PRIMARY OSTEOARTHRITIS, RIGHT HIP: ICD-10-CM

## 2024-12-13 DIAGNOSIS — M54.2 CERVICALGIA: ICD-10-CM

## 2024-12-13 PROCEDURE — 99214 OFFICE O/P EST MOD 30 MIN: CPT

## 2025-01-22 ENCOUNTER — APPOINTMENT (OUTPATIENT)
Age: 80
End: 2025-01-22
Payer: MEDICARE

## 2025-01-22 PROCEDURE — 73525 CONTRAST X-RAY OF HIP: CPT | Mod: 26,59

## 2025-01-22 PROCEDURE — 27093 INJECTION FOR HIP X-RAY: CPT | Mod: RT

## 2025-02-07 ENCOUNTER — APPOINTMENT (OUTPATIENT)
Dept: PAIN MANAGEMENT | Facility: CLINIC | Age: 80
End: 2025-02-07
Payer: MEDICARE

## 2025-02-07 VITALS — BODY MASS INDEX: 26.84 KG/M2 | HEIGHT: 66 IN | WEIGHT: 167 LBS

## 2025-02-07 DIAGNOSIS — M16.11 UNILATERAL PRIMARY OSTEOARTHRITIS, RIGHT HIP: ICD-10-CM

## 2025-02-07 PROCEDURE — 99214 OFFICE O/P EST MOD 30 MIN: CPT

## 2025-02-10 ENCOUNTER — APPOINTMENT (OUTPATIENT)
Dept: MRI IMAGING | Facility: CLINIC | Age: 80
End: 2025-02-10
Payer: MEDICARE

## 2025-02-10 PROCEDURE — 72148 MRI LUMBAR SPINE W/O DYE: CPT

## 2025-03-12 ENCOUNTER — APPOINTMENT (OUTPATIENT)
Age: 80
End: 2025-03-12
Payer: MEDICARE

## 2025-03-12 PROCEDURE — 64490 INJ PARAVERT F JNT C/T 1 LEV: CPT | Mod: RT

## 2025-03-12 PROCEDURE — 64491 INJ PARAVERT F JNT C/T 2 LEV: CPT | Mod: RT,59

## 2025-03-24 NOTE — ASU PATIENT PROFILE, ADULT - PACKS YRS CALCULATION
Rest, increase fluids, lots of water and liquids.  Ice treatment and or warm moist heat as needed.  [facility], or the [facility].  For ongoing symptoms, you may need MRI of the lumbar spine.  This can be ordered by your clinic on a nonemergent outpatient basis.  Medications as prescribed.  Do not take other NSAID products if you are going to try the naproxen.  Continue prescribed Percocet.  If you try the Flexeril, do not take other muscle relaxers at the same time   2

## 2025-03-28 ENCOUNTER — APPOINTMENT (OUTPATIENT)
Dept: PAIN MANAGEMENT | Facility: CLINIC | Age: 80
End: 2025-03-28
Payer: MEDICARE

## 2025-03-28 VITALS — BODY MASS INDEX: 25.83 KG/M2 | WEIGHT: 155 LBS | HEIGHT: 65 IN

## 2025-03-28 DIAGNOSIS — M54.50 LOW BACK PAIN, UNSPECIFIED: ICD-10-CM

## 2025-03-28 DIAGNOSIS — M47.816 SPONDYLOSIS W/OUT MYELOPATHY OR RADICULOPATHY, LUMBAR REGION: ICD-10-CM

## 2025-03-28 PROCEDURE — 99214 OFFICE O/P EST MOD 30 MIN: CPT

## 2025-04-23 ENCOUNTER — APPOINTMENT (OUTPATIENT)
Age: 80
End: 2025-04-23
Payer: MEDICARE

## 2025-04-23 PROCEDURE — 64635 DESTROY LUMB/SAC FACET JNT: CPT | Mod: 50

## 2025-04-23 PROCEDURE — 64636 DESTROY L/S FACET JNT ADDL: CPT | Mod: 50,59

## 2025-04-23 RX ORDER — GABAPENTIN 100 MG/1
100 CAPSULE ORAL
Qty: 90 | Refills: 1 | Status: ACTIVE | COMMUNITY
Start: 2025-04-23 | End: 1900-01-01

## 2025-05-09 ENCOUNTER — APPOINTMENT (OUTPATIENT)
Dept: PAIN MANAGEMENT | Facility: CLINIC | Age: 80
End: 2025-05-09

## 2025-06-06 ENCOUNTER — APPOINTMENT (OUTPATIENT)
Dept: PAIN MANAGEMENT | Facility: CLINIC | Age: 80
End: 2025-06-06
Payer: MEDICARE

## 2025-06-06 VITALS — HEIGHT: 65 IN | WEIGHT: 155 LBS | BODY MASS INDEX: 25.83 KG/M2

## 2025-06-06 PROCEDURE — 99214 OFFICE O/P EST MOD 30 MIN: CPT

## 2025-06-30 NOTE — ASU DISCHARGE PLAN (ADULT/PEDIATRIC) - HISTORY OF COVID-19 VACCINATION
Yes
Pt triaged, treated and dispo by provider, pt verbalized understanding of discharge instructions,  refer to provider notes./DC instructions

## 2025-07-15 ENCOUNTER — OFFICE (OUTPATIENT)
Dept: URBAN - METROPOLITAN AREA CLINIC 109 | Facility: CLINIC | Age: 80
Setting detail: OPHTHALMOLOGY
End: 2025-07-15
Payer: MEDICARE

## 2025-07-15 DIAGNOSIS — H43.813: ICD-10-CM

## 2025-07-15 DIAGNOSIS — H35.54: ICD-10-CM

## 2025-07-15 DIAGNOSIS — H35.342: ICD-10-CM

## 2025-07-15 PROCEDURE — 92004 COMPRE OPH EXAM NEW PT 1/>: CPT | Performed by: OPTOMETRIST

## 2025-07-15 PROCEDURE — 92134 CPTRZ OPH DX IMG PST SGM RTA: CPT | Performed by: OPTOMETRIST

## 2025-07-15 ASSESSMENT — TONOMETRY
OS_IOP_MMHG: 11
OD_IOP_MMHG: 10

## 2025-07-15 ASSESSMENT — REFRACTION_CURRENTRX
OD_AXIS: 100
OS_OVR_VA: 20/
OS_AXIS: 0
OS_SPHERE: +0.75
OD_SPHERE: +2.50
OS_CYLINDER: 0.00
OD_CYLINDER: -1.25
OD_OVR_VA: 20/

## 2025-07-15 ASSESSMENT — CONFRONTATIONAL VISUAL FIELD TEST (CVF)
OD_FINDINGS: FULL
OS_FINDINGS: FULL

## 2025-07-15 ASSESSMENT — VISUAL ACUITY
OD_BCVA: 20/80+1
OS_BCVA: 20/40+3

## 2025-07-15 ASSESSMENT — KERATOMETRY
OD_K1POWER_DIOPTERS: 45.50
OS_K2POWER_DIOPTERS: 47.00
OD_K2POWER_DIOPTERS: 46.75
OS_AXISANGLE_DEGREES: 155
OD_AXISANGLE_DEGREES: 012
OS_K1POWER_DIOPTERS: 45.00

## 2025-07-15 ASSESSMENT — REFRACTION_AUTOREFRACTION
OS_SPHERE: +0.50
OS_CYLINDER: -2.22
OD_CYLINDER: -2.00
OD_AXIS: 099
OS_AXIS: 067
OD_SPHERE: +0.75

## 2025-07-17 ENCOUNTER — DOCTOR'S OFFICE (OUTPATIENT)
Facility: LOCATION | Age: 80
Setting detail: OPHTHALMOLOGY
End: 2025-07-17
Payer: MEDICARE

## 2025-07-17 DIAGNOSIS — H43.393: ICD-10-CM

## 2025-07-17 DIAGNOSIS — H35.342: ICD-10-CM

## 2025-07-17 DIAGNOSIS — H43.813: ICD-10-CM

## 2025-07-17 DIAGNOSIS — H35.373: ICD-10-CM

## 2025-07-17 DIAGNOSIS — H26.493: ICD-10-CM

## 2025-07-17 DIAGNOSIS — H35.073: ICD-10-CM

## 2025-07-17 PROCEDURE — 92201 OPSCPY EXTND RTA DRAW UNI/BI: CPT | Performed by: OPHTHALMOLOGY

## 2025-07-17 PROCEDURE — 92014 COMPRE OPH EXAM EST PT 1/>: CPT | Performed by: OPHTHALMOLOGY

## 2025-07-17 PROCEDURE — 92134 CPTRZ OPH DX IMG PST SGM RTA: CPT | Performed by: OPHTHALMOLOGY

## 2025-07-17 ASSESSMENT — REFRACTION_AUTOREFRACTION
OD_SPHERE: +0.75
OS_SPHERE: +0.50
OS_AXIS: 067
OD_CYLINDER: -2.00
OS_CYLINDER: -2.22
OD_AXIS: 099

## 2025-07-17 ASSESSMENT — KERATOMETRY
OS_K1POWER_DIOPTERS: 45.00
OD_AXISANGLE_DEGREES: 012
OD_K1POWER_DIOPTERS: 45.50
OS_AXISANGLE_DEGREES: 155
OD_K2POWER_DIOPTERS: 46.75
OS_K2POWER_DIOPTERS: 47.00

## 2025-07-17 ASSESSMENT — VISUAL ACUITY
OS_BCVA: 20/30-2
OD_BCVA: 20/70-2

## 2025-07-24 ENCOUNTER — OFFICE (OUTPATIENT)
Facility: LOCATION | Age: 80
Setting detail: OPHTHALMOLOGY
End: 2025-07-24
Payer: MEDICARE

## 2025-07-24 DIAGNOSIS — H26.493: ICD-10-CM

## 2025-07-24 DIAGNOSIS — H02.403: ICD-10-CM

## 2025-07-24 PROCEDURE — 99213 OFFICE O/P EST LOW 20 MIN: CPT | Performed by: OPHTHALMOLOGY

## 2025-07-24 ASSESSMENT — REFRACTION_AUTOREFRACTION
OS_AXIS: 064
OS_CYLINDER: -2.50
OD_SPHERE: +0.75
OD_AXIS: 103
OD_CYLINDER: -1.50
OS_SPHERE: +1.00

## 2025-07-24 ASSESSMENT — VISUAL ACUITY
OD_BCVA: 20/100+1
OS_BCVA: 20/30-2

## 2025-07-24 ASSESSMENT — KERATOMETRY
OS_K2POWER_DIOPTERS: 47.00
OS_K1POWER_DIOPTERS: 44.75
OD_K2POWER_DIOPTERS: 46.25
OS_AXISANGLE_DEGREES: 154
OD_AXISANGLE_DEGREES: 014
OD_K1POWER_DIOPTERS: 45.25

## 2025-07-24 ASSESSMENT — LID POSITION - PTOSIS
OS_PTOSIS: LUL T 1+
OD_PTOSIS: RUL T 1+

## 2025-07-24 ASSESSMENT — CONFRONTATIONAL VISUAL FIELD TEST (CVF)
OD_FINDINGS: FULL
OS_FINDINGS: FULL

## 2025-07-25 NOTE — H&P PST ADULT - PAIN LOCATION, PROFILE

## 2025-07-27 NOTE — DISCHARGE NOTE ADULT - MEDICATION SUMMARY - MEDICATIONS TO CHANGE
Physical Therapy    Physical Therapy Evaluation    Patient Name: Jami Pandey  MRN: [id number]  Today's Date: 7/27/2025   Time Calculation  Start Time: 1046  Stop Time: 1110  Time Calculation (min): 24 min  608/608-A    Assessment/Plan   PT Assessment  PT Assessment Results: Decreased strength, Decreased endurance, Impaired balance, Decreased mobility, Impaired sensation  Rehab Prognosis: Good  Barriers to Discharge Home: No anticipated barriers  Evaluation/Treatment Tolerance: Patient tolerated treatment well  Medical Staff Made Aware: Yes  End of Session Communication: Bedside nurse  Assessment Comment: Pt presents /c above impairments and decline from functional baseline 2nd acute medical conditions. Pt will benefit from continued PT services at low intensity to address above and maximize functional mobility.  End of Session Patient Position: Bed, 3 rail up, Alarm off, not on at start of session  IP OR SWING BED PT PLAN  Inpatient or Swing Bed: Inpatient  PT Plan  Treatment/Interventions: Bed mobility, Transfer training, Gait training, Balance training, Neuromuscular re-education, Strengthening, Endurance training, Therapeutic exercise, Therapeutic activity, Stair training  PT Plan: Ongoing PT  PT Frequency: 2 times per week  PT Discharge Recommendations: Low intensity level of continued care  PT Recommended Transfer Status: Assist x1  PT - OK to Discharge: Yes    Subjective     Current Problem:  1. Generalized weakness  Transthoracic echo (TTE) complete    Transthoracic echo (TTE) complete      2. Unsteady gait        3. Edema of both lower extremities  Transthoracic echo (TTE) complete    Transthoracic echo (TTE) complete        Problem List[1]    General Visit Information:  General  Reason for Referral: PT eval and treat  Referred By: Reddy  Past Medical History Relevant to Rehab: HTN, HLD, OA  Co-Treatment: OT  Co-Treatment Reason: possible two person assist, maximize functional mobility  Prior to Session  Communication: Bedside nurse  Patient Position Received: Bed, 3 rail up, Alarm off, not on at start of session  General Comment: Pt presents with weakness, dizziness x1 week and slurred speech. Head CT (-), brain MRI pending. Cardiology and neurology following. Pt cleared for therapy by nursing. Pt supine, agreeable.    Home Living:  Home Living  Home Living Comments: Pt lives alone in 2 story home. 2STE, 13 /c rail to 2nd floor. Pt has 5 sons, local/supportive.    Prior Level of Function:  Prior Function Per Pt/Caregiver Report  Prior Function Comments: Pt indep /c ADL/IADLs. Use of cane in the community, also has access to WW. Denies falls. Pt drives. Sleeps in an adjustable bed.    Precautions:  Precautions  Precautions Comment: falls       Objective     Pain:  Pain Assessment  Pain Assessment: 0-10  0-10 (Numeric) Pain Score: 0 - No pain    Cognition:  Cognition  Overall Cognitive Status: Within Functional Limits  Orientation Level: Oriented X4    General Assessments:  General Observation  General Observation: activity orders: OOB /c A lines: pulse ox, piv  skin integrity: WFL   Activity Tolerance  Endurance: Tolerates 10 - 20 min exercise with multiple rests  Sensation  Sensation Comment: chronic n/t R hand and miri feet  Strength  Strength Comments: BLE 4/5           Dynamic Sitting Balance  Dynamic Sitting-Comments: G  Dynamic Standing Balance  Dynamic Standing-Comments: F    Functional Assessments:     Bed Mobility  Bed Mobility: Yes  Bed Mobility 1  Bed Mobility Comments 1: Supine<>Sit /c SBA, UE support  Transfers  Transfer: Yes  Transfer 1  Trials/Comments 1: Sit<>Stand /c minAx1 d/t initial retropulsive lean.  Ambulation/Gait Training  Ambulation/Gait Training Performed:  (Pt ambulates 20' WW, CGA. Slow steady step through pattern. Pt denies any dizziness/SOB. Steady, discontinuous steps during turns.)       Outcome Measures:     [facility] Basic Mobility  Turning from your back to your side while in a flat  bed without using bedrails: A little  Moving from lying on your back to sitting on the side of a flat bed without using bedrails: A little  Moving to and from bed to chair (including a wheelchair): A little  Standing up from a chair using your arms (e.g. wheelchair or bedside chair): A little  To walk in hospital room: A little  Climbing 3-5 steps with railing: A little  Basic Mobility - Total Score: 18                Goals:  Encounter Problems       Encounter Problems (Active)       PT Problem       STG - Pt will transition supine <> sitting with mod I (Progressing)       Start:  07/27/25    Expected End:  08/10/25            STG - Pt will transfer STS with mod I (Progressing)       Start:  07/27/25    Expected End:  08/10/25            STG - Pt will amb 150' using LRAD with mod I (Progressing)       Start:  07/27/25    Expected End:  08/10/25            STG -  Pt will navigate 4 stairs using rail with supervision (Progressing)       Start:  07/27/25    Expected End:  08/10/25                 Education Documentation  Mobility Training, taught by Barbara Garcia, PT at 7/27/2025  2:00 PM.  Learner: Patient  Readiness: Acceptance  Method: Explanation  Response: Verbalizes Understanding, Needs Reinforcement  Comment: PT POC    Education Comments  No comments found.               [1]   Patient Active Problem List  Diagnosis    Acute stress disorder    Bilateral carpal tunnel syndrome    Dyspnea on exertion    Edema of both lower extremities    History of Mohs micrographic surgery for skin cancer    Hyperlipidemia    HTN (hypertension)    Hypothyroidism    OA (osteoarthritis) of finger    Osteoarthritis    Osteoarthritis of left ankle    Osteoporosis    Palpitations    Rosacea    Unsteadiness on feet    Vitamin D deficiency    Carpal tunnel syndrome    Edema of lower extremity    Dehydration    Urinary tract infection without hematuria    Generalized weakness      I will SWITCH the dose or number of times a day I take the medications listed below when I get home from the hospital:  None

## 2025-07-31 ENCOUNTER — OFFICE (OUTPATIENT)
Facility: LOCATION | Age: 80
Setting detail: OPHTHALMOLOGY
End: 2025-07-31
Payer: MEDICARE

## 2025-07-31 DIAGNOSIS — H26.492: ICD-10-CM

## 2025-07-31 PROBLEM — H35.342 MACULAR HOLE OR PSEUDOHOLE; LEFT EYE: Status: ACTIVE | Noted: 2025-07-15

## 2025-07-31 PROBLEM — H43.813 POSTERIOR VITREOUS DETACHMENT; BOTH EYES: Status: ACTIVE | Noted: 2025-07-15

## 2025-07-31 PROBLEM — H35.373 EPIRETINAL MEMBRANE; BOTH EYES: Status: ACTIVE | Noted: 2025-07-17

## 2025-07-31 PROBLEM — H02.403 PTOSIS; BOTH EYES: Status: ACTIVE | Noted: 2025-07-24

## 2025-07-31 PROBLEM — H35.073 RETINAL TELANGIECTASIA; BOTH EYES: Status: ACTIVE | Noted: 2025-07-17

## 2025-07-31 PROBLEM — H43.393 VITREOUS FLOATERS; BOTH EYES: Status: ACTIVE | Noted: 2025-07-17

## 2025-07-31 PROCEDURE — 66821 AFTER CATARACT LASER SURGERY: CPT | Mod: LT | Performed by: OPHTHALMOLOGY

## 2025-07-31 ASSESSMENT — VISUAL ACUITY
OD_BCVA: 20/150-
OS_BCVA: 20/70-2

## 2025-07-31 ASSESSMENT — CONFRONTATIONAL VISUAL FIELD TEST (CVF)
OS_FINDINGS: FULL
OD_FINDINGS: FULL

## 2025-08-29 ENCOUNTER — OFFICE (OUTPATIENT)
Facility: LOCATION | Age: 80
Setting detail: OPHTHALMOLOGY
End: 2025-08-29
Payer: MEDICARE

## 2025-08-29 DIAGNOSIS — H26.492: ICD-10-CM

## 2025-08-29 DIAGNOSIS — H02.403: ICD-10-CM

## 2025-08-29 DIAGNOSIS — H52.4: ICD-10-CM

## 2025-08-29 PROCEDURE — 99024 POSTOP FOLLOW-UP VISIT: CPT | Performed by: OPHTHALMOLOGY

## 2025-08-29 PROCEDURE — 92015 DETERMINE REFRACTIVE STATE: CPT | Performed by: OPHTHALMOLOGY

## 2025-08-29 ASSESSMENT — KERATOMETRY
OD_K2POWER_DIOPTERS: 46.50
OS_K2POWER_DIOPTERS: 57.25
OS_K1POWER_DIOPTERS: 45.50
OS_AXISANGLE_DEGREES: 141
OD_AXISANGLE_DEGREES: 008
OD_K1POWER_DIOPTERS: 45.50

## 2025-08-29 ASSESSMENT — VISUAL ACUITY
OD_BCVA: 20/80
OS_BCVA: 20/40

## 2025-08-29 ASSESSMENT — CONFRONTATIONAL VISUAL FIELD TEST (CVF)
OS_FINDINGS: FULL
OD_FINDINGS: FULL

## 2025-08-29 ASSESSMENT — REFRACTION_AUTOREFRACTION
OS_CYLINDER: -2.25
OD_SPHERE: +0.50
OD_CYLINDER: -1.50
OS_AXIS: 063
OD_AXIS: 009
OS_SPHERE: +0.50

## 2025-08-29 ASSESSMENT — REFRACTION_MANIFEST
OD_AXIS: 010
OS_CYLINDER: -2.25
OS_VA1: 20/50-
OS_ADD: +3.00
OS_AXIS: 065
OD_SPHERE: +0.25
OS_SPHERE: +0.25
OD_VA1: 20/25-
OD_ADD: +3.00
OD_CYLINDER: -1.50

## 2025-08-29 ASSESSMENT — TONOMETRY
OS_IOP_MMHG: 11
OD_IOP_MMHG: 11

## 2025-08-29 ASSESSMENT — LID POSITION - PTOSIS
OS_PTOSIS: LUL T 1+
OD_PTOSIS: RUL T 1+

## (undated) DEVICE — Device

## (undated) DEVICE — VENODYNE/SCD SLEEVE CALF LARGE

## (undated) DEVICE — VISITEC 4X4

## (undated) DEVICE — DRSG TELFA 3 X 8

## (undated) DEVICE — DRSG STERISTRIPS 0.5 X 4"

## (undated) DEVICE — POSITIONER JACKSON TABLE HEADREST 7", CHEST, HIP, THIGH PADS

## (undated) DEVICE — DRSG TAPE TRANSPORE 3"

## (undated) DEVICE — VAGINAL PACKING 2 X 6"

## (undated) DEVICE — PACK LUMBAR LAMI

## (undated) DEVICE — DRAPE 1/2 SHEET 40X57"

## (undated) DEVICE — WARMING BLANKET UPPER ADULT

## (undated) DEVICE — SPECIMEN CONTAINER 100ML

## (undated) DEVICE — LAP PAD 18 X 18"

## (undated) DEVICE — DRAPE IOBAN 23" X 23"

## (undated) DEVICE — SOL IRR BAG NS 0.9% 3000ML

## (undated) DEVICE — MARKING PEN W RULER

## (undated) DEVICE — MIDAS REX LEGEND MATCH HEAD FLUTED LG BORE 3.0MM X 14CM

## (undated) DEVICE — MAZOR X SCAN & PLAN DISP KIT

## (undated) DEVICE — MAZOR X SPINE DISP KIT

## (undated) DEVICE — DRAPE LIGHT HANDLE COVER (BLUE)

## (undated) DEVICE — NDL SPINAL 18G X 3.5" (PINK)

## (undated) DEVICE — POSITIONER FOAM EGG CRATE ULNAR 2PCS (PINK)

## (undated) DEVICE — SUT MONOCRYL 4-0 27" PS-2 UNDYED

## (undated) DEVICE — NDL HYPO SAFE 18G X 1.5" (PINK)

## (undated) DEVICE — DRAPE TOWEL BLUE 17" X 24"

## (undated) DEVICE — GLV 7.5 PROTEXIS (WHITE)

## (undated) DEVICE — SUCTION YANKAUER NO CONTROL VENT

## (undated) DEVICE — GLV 8 PROTEXIS (WHITE)

## (undated) DEVICE — SOL IRR POUR H2O 250ML

## (undated) DEVICE — SOL IRR POUR NS 0.9% 500ML

## (undated) DEVICE — PREP CHLORAPREP HI-LITE ORANGE 26ML

## (undated) DEVICE — DRAPE MAYO STAND 30"

## (undated) DEVICE — NDL HYPO SAFE 22G X 1.5" (BLACK)

## (undated) DEVICE — SYR LUER LOK 20CC

## (undated) DEVICE — SUT POLYSORB 3-0 30" V-20 UNDYED

## (undated) DEVICE — POSITIONER CUSHION INSERT PRONE VIEW LG

## (undated) DEVICE — DRAPE EQUIPMENT BANDED BAG 30 X 30" (SHOWER CAP)

## (undated) DEVICE — BIPOLAR FORCEP SYMMETRY BAYONET 7" X 1.5MM SMOOTH (SILVER)

## (undated) DEVICE — GOWN TRIMAX LG

## (undated) DEVICE — DRAIN RESERVOIR FOR JACKSON PRATT 100CC CARDINAL

## (undated) DEVICE — DRSG TEGADERM 6"X8"

## (undated) DEVICE — DRAPE C ARM UNIVERSAL

## (undated) DEVICE — DRSG DERMABOND 0.7ML

## (undated) DEVICE — MIDAS REX LEGEND LUBRICANT DIFFUSER CARTRIDGE

## (undated) DEVICE — STRYKER INTERPULSE HANDPIECE W IRR SUCTION TUBE

## (undated) DEVICE — SYR LUER LOK 10CC

## (undated) DEVICE — ELCTR BOVIE TIP BLADE INSULATED 6.5" EDGE

## (undated) DEVICE — DRAIN JACKSON PRATT 3 SPRING RESERVOIR W 10FR PVC DRAIN

## (undated) DEVICE — SUT POLYSORB 2-0 30" V-20 UNDYED